# Patient Record
Sex: MALE | Race: WHITE | NOT HISPANIC OR LATINO | Employment: FULL TIME | URBAN - METROPOLITAN AREA
[De-identification: names, ages, dates, MRNs, and addresses within clinical notes are randomized per-mention and may not be internally consistent; named-entity substitution may affect disease eponyms.]

---

## 2017-03-27 ENCOUNTER — HOSPITAL ENCOUNTER (EMERGENCY)
Facility: CLINIC | Age: 27
Discharge: HOME | End: 2017-03-28
Attending: EMERGENCY MEDICINE | Admitting: EMERGENCY MEDICINE
Payer: COMMERCIAL

## 2017-03-27 DIAGNOSIS — F10.229 ACUTE ALCOHOLIC INTOXICATION IN ALCOHOLISM, WITH UNSPECIFIED COMPLICATION (H): ICD-10-CM

## 2017-03-27 DIAGNOSIS — F32.A DEPRESSION, UNSPECIFIED DEPRESSION TYPE: ICD-10-CM

## 2017-03-27 LAB
ALCOHOL BREATH TEST: 0.38 (ref 0–0.01)
AMPHETAMINES UR QL SCN: ABNORMAL
ANION GAP SERPL CALCULATED.3IONS-SCNC: 11 MMOL/L (ref 3–14)
APAP SERPL-MCNC: NORMAL MG/L (ref 10–20)
BARBITURATES UR QL: ABNORMAL
BENZODIAZ UR QL: ABNORMAL
BUN SERPL-MCNC: 10 MG/DL (ref 7–30)
CALCIUM SERPL-MCNC: 8.6 MG/DL (ref 8.5–10.1)
CANNABINOIDS UR QL SCN: ABNORMAL
CHLORIDE SERPL-SCNC: 109 MMOL/L (ref 94–109)
CO2 SERPL-SCNC: 26 MMOL/L (ref 20–32)
COCAINE UR QL: ABNORMAL
CREAT SERPL-MCNC: 0.68 MG/DL (ref 0.66–1.25)
ETHANOL UR QL SCN: ABNORMAL
GFR SERPL CREATININE-BSD FRML MDRD: ABNORMAL ML/MIN/1.7M2
GLUCOSE SERPL-MCNC: 95 MG/DL (ref 70–99)
OPIATES UR QL SCN: ABNORMAL
POTASSIUM SERPL-SCNC: 3.6 MMOL/L (ref 3.4–5.3)
SALICYLATES SERPL-MCNC: NORMAL MG/DL
SODIUM SERPL-SCNC: 146 MMOL/L (ref 133–144)

## 2017-03-27 PROCEDURE — 99285 EMERGENCY DEPT VISIT HI MDM: CPT | Mod: 25 | Performed by: EMERGENCY MEDICINE

## 2017-03-27 PROCEDURE — 80329 ANALGESICS NON-OPIOID 1 OR 2: CPT | Performed by: EMERGENCY MEDICINE

## 2017-03-27 PROCEDURE — 96360 HYDRATION IV INFUSION INIT: CPT | Performed by: EMERGENCY MEDICINE

## 2017-03-27 PROCEDURE — 93010 ELECTROCARDIOGRAM REPORT: CPT | Mod: 76 | Performed by: EMERGENCY MEDICINE

## 2017-03-27 PROCEDURE — 80307 DRUG TEST PRSMV CHEM ANLYZR: CPT | Performed by: FAMILY MEDICINE

## 2017-03-27 PROCEDURE — 99284 EMERGENCY DEPT VISIT MOD MDM: CPT | Mod: 25 | Performed by: EMERGENCY MEDICINE

## 2017-03-27 PROCEDURE — 82075 ASSAY OF BREATH ETHANOL: CPT | Performed by: EMERGENCY MEDICINE

## 2017-03-27 PROCEDURE — 93005 ELECTROCARDIOGRAM TRACING: CPT | Mod: 76 | Performed by: EMERGENCY MEDICINE

## 2017-03-27 PROCEDURE — 80320 DRUG SCREEN QUANTALCOHOLS: CPT | Performed by: FAMILY MEDICINE

## 2017-03-27 PROCEDURE — 36415 COLL VENOUS BLD VENIPUNCTURE: CPT | Performed by: EMERGENCY MEDICINE

## 2017-03-27 PROCEDURE — 90791 PSYCH DIAGNOSTIC EVALUATION: CPT

## 2017-03-27 PROCEDURE — 25000128 H RX IP 250 OP 636

## 2017-03-27 PROCEDURE — 80048 BASIC METABOLIC PNL TOTAL CA: CPT | Performed by: EMERGENCY MEDICINE

## 2017-03-27 RX ORDER — SODIUM CHLORIDE 9 MG/ML
INJECTION, SOLUTION INTRAVENOUS
Status: COMPLETED
Start: 2017-03-27 | End: 2017-03-27

## 2017-03-27 RX ADMIN — Medication 1000 ML: at 21:26

## 2017-03-27 RX ADMIN — SODIUM CHLORIDE 1000 ML: 9 INJECTION, SOLUTION INTRAVENOUS at 21:26

## 2017-03-27 ASSESSMENT — ENCOUNTER SYMPTOMS: DYSPHORIC MOOD: 1

## 2017-03-27 NOTE — ED AVS SNAPSHOT
[facility], Emergency Department    [address]    [place] [address]    Phone:  [phone]    Fax:  [phone]                                       Martín Shoemaker   MRN: [id number]    Department:  [facility], Emergency Department   Date of Visit:  3/27/2017           Patient Information     Date Of Birth          1990        Your diagnoses for this visit were:     Acute alcoholic intoxication in alcoholism, with unspecified complication (H)     Depression, unspecified depression type        You were seen by Fang Vicente MD.        Discharge Instructions       To check the status of detox bed availability at [facility] call:  [phone]  To check the status of detox availability at [facility] call:  [phone]  To check the status of detox bed availability at [address] [place] [address] call:  [phone]      24 Hour Appointment Hotline       To make an appointment at any [facility] clinic, call [phone] ([phone]). If you don't have a family doctor or clinic, we will help you find one. [facility] clinics are conveniently located to serve the needs of you and your family.             Review of your medicines      Our records show that you are taking the medicines listed below. If these are incorrect, please call your family doctor or clinic.        Dose / Directions Last dose taken    LEXAPRO PO   Dose:  10 mg        Take 10 mg by mouth daily   Refills:  0                Procedures and tests performed during your visit     Acetaminophen level    Alcohol breath test POCT    Basic metabolic panel    Drug abuse screen 6 urine (chem dep)    EKG 12 lead    EKG 12-lead, tracing only    Salicylate level      Orders Needing Specimen Collection     None      Pending Results     No orders found for last 3 day(s).            Pending Culture Results     No orders found for last 3 day(s).            Thank you for choosing [facility]       Thank you for choosing [facility] for your care. Our goal is  "always to provide you with excellent care. Hearing back from our patients is one way we can continue to improve our services. Please take a few minutes to complete the written survey that you may receive in the mail after you visit with us. Thank you!        [facility] Information     [facility] lets you send messages to your doctor, view your test results, renew your prescriptions, schedule appointments and more. To sign up, go to [email or web address] . Click on \"Log in\" on the left side of the screen, which will take you to the Welcome page. Then click on \"Sign up Now\" on the right side of the page.     You will be asked to enter the access code listed below, as well as some personal information. Please follow the directions to create your username and password.     Your access code is: [email or web address]  Expires: 2017  6:39 AM     Your access code will  in 90 days. If you need help or a new code, please call your [facility] clinic or [phone].        Care EveryWhere ID     This is your Care EveryWhere ID. This could be used by other organizations to access your [facility] medical records  [id number]        After Visit Summary       This is your record. Keep this with you and show to your community pharmacist(s) and doctor(s) at your next visit.                  "

## 2017-03-28 VITALS
HEART RATE: 108 BPM | DIASTOLIC BLOOD PRESSURE: 70 MMHG | TEMPERATURE: 98.1 F | OXYGEN SATURATION: 98 % | SYSTOLIC BLOOD PRESSURE: 110 MMHG | RESPIRATION RATE: 30 BRPM

## 2017-03-28 ASSESSMENT — ENCOUNTER SYMPTOMS
SHORTNESS OF BREATH: 0
ABDOMINAL PAIN: 0
FEVER: 0

## 2017-03-28 NOTE — DISCHARGE INSTRUCTIONS
To check the status of detox bed availability at [facility] call:  [phone]  To check the status of detox availability at [facility] call:  [phone]  To check the status of detox bed availability at [address] [place] [address] call:  [phone]

## 2017-03-28 NOTE — ED PROVIDER NOTES
[facility] EMERGENCY DEPARTMENT ([facility])    March 27, 2017    ED 11   History     Chief Complaint   Patient presents with     Drug Overdose     patient reportedly took 2 extra lexapro today and two extra last night; 7 lexapro remain in bottle that was filled on 2/22/2017; per EMS and family at scene, pt has been drinking alcohol as well     Alcohol Problem     patient states he has not had any alcohol to drink and declined breathalyzer; smells of ETOH and is emotionally labile--crying one moment and fine the next     The history is provided by the patient and medical records.     Martín Shoemaker is a 26-year-old male who presents to the Emergency Department today after an apparent overdose.  He states he was in an argument with his significant other, went to his parents house.  He admits that he s been drinking today.  He is not forthcoming on what happened exactly, but paramedics report that he took an overdose.  He states that he took 2 Lexapro at approximately 2:30 PM.  He states that he took one Lexapro at approximately 1:30 AM.  He states that he did this because he depressed and wanted to feel better.  He adamantly denies suicidal ideation, denies any other coingestions.  Denies specifically Tylenol and salicylate ingestion.  He denies history of suicide attempts.  He does state that he has recently seen a psychiatrist and that is who prescribed the Lexapro.  Denies recreational drug use.  He denies any physical complaints at this time.  No vomiting today.      Family arrived, and states that he is a heavy drinker. They state that he seemed OK today, then girlfriend went out to get food, and when she came back he seemed sleepy, then put his head down on the table, and started drooling, and had decreased responsiveness. She became worried and called 911. He then told her that he took an extra Lexapro. She reports that they had an argument last night.     I have reviewed the Medications, Allergies,  Past Medical and Surgical History, and Social History in the [facility] system.  PAST MEDICAL HISTORY:   Past Medical History:   Diagnosis Date     Depressive disorder        PAST SURGICAL HISTORY: No past surgical history on file.    FAMILY HISTORY: No family history on file.    SOCIAL HISTORY:   Social History   Substance Use Topics     Smoking status: Not on file     Smokeless tobacco: Not on file     Alcohol use Yes      Comment: binge drinker, 1/2 L vodka over one day       Patient's Medications   New Prescriptions    No medications on file   Previous Medications    ESCITALOPRAM OXALATE (LEXAPRO PO)    Take 10 mg by mouth daily   Modified Medications    No medications on file   Discontinued Medications    No medications on file        No Known Allergies   Review of Systems   Constitutional: Negative for fever.   Respiratory: Negative for shortness of breath.    Cardiovascular: Negative for chest pain.   Gastrointestinal: Negative for abdominal pain.   Psychiatric/Behavioral: Positive for dysphoric mood and suicidal ideas.   All other systems reviewed and are negative.      Physical Exam   BP: 110/66  Pulse: 108  Heart Rate: 109  Temp: 98.3  F (36.8  C)  Resp: 16  SpO2: 92 %  Physical Exam   Constitutional: No distress.   Grossly intoxicated   HENT:   Head: Atraumatic.   Eyes: No scleral icterus.   Cardiovascular: Normal heart sounds and intact distal pulses.    Pulmonary/Chest: Breath sounds normal. No respiratory distress.   Abdominal: Soft. There is no tenderness.   Musculoskeletal: He exhibits no edema or tenderness.   Skin: Skin is warm. No rash noted. He is not diaphoretic.   Psychiatric:   Tearful at times       ED Course     ED Course     Procedures             EKG Interpretation:      Interpreted by Fang Vicente  Time reviewed: 1950  Symptoms at time of EKG: None   Rhythm: sinus tachycardia  Rate: 107  Axis: Normal  Ectopy: none  Conduction: normal -- QTc 467  ST Segments/ T Waves: No ST-T wave  changes  Q Waves: none  Comparison to prior: No old EKG available    Clinical Impression: mild sinus tach             EKG Interpretation:      Interpreted by Fang Vicente  Time reviewed:2340  Symptoms at time of EKG: None   Rhythm: Normal sinus   Rate: Normal  Axis: Normal  Ectopy: None  Conduction: Normal QTc 467  ST Segments/ T Waves: No ST-T wave changes and No acute ischemic changes  Q Waves: None  Comparison to prior: Unchanged - other than nl rate    Clinical Impression: normal EKG            Critical Care time:  none               Labs Ordered and Resulted from Time of ED Arrival Up to the Time of Departure from the ED   BASIC METABOLIC PANEL - Abnormal; Notable for the following:        Result Value    Sodium 146 (*)     All other components within normal limits   DRUG ABUSE SCREEN 6 CHEM DEP URINE ([facility]) - Abnormal; Notable for the following:     Ethanol Qual Urine   (*)     Value: Positive   Cutoff for a positive urine ethanol is greater than 0.05 g/dL.  This is an   unconfirmed screening result to be used for medical purposes only.      All other components within normal limits   ALCOHOL BREATH TEST POCT - Abnormal; Notable for the following:     Alcohol Breath Test 0.382 (*)     All other components within normal limits   ACETAMINOPHEN LEVEL   SALICYLATE LEVEL       Assessments & Plan (with Medical Decision Making)   It is unclear whether he truly overdosed on his Lexapro, other than taking one additional tab.  I did get some overdose labs including acetaminophen and salicylate levels, which were both negative.  BMP was not remarkable other than a slightly high sodium of 146.  Anion gap is normal.  He did breathless high at 0.382.  Drug-screen was positive only for ethanol.  EKG was done which showed a sinus tachycardia with a rate of 107, unremarkable, with a QTC at 467.  I did speak with the [facility] and they did recommend monitoring him for 4-6 hours and repeating an EKG.  This was done,  and his QTc remained stable at 467.  Poison Center stated that if his QTc was not widening they felt he would be medically cleared.  He was quite intoxicated upon arrival.  Family stated that things have not been going well.  They feel that he has been depressed.  It s unclear whether he did take an overdose or not.  We will plan to monitor him in the ER until his alcohol level decreases to a reasonable level where he can be assessed by the [facility] .  Patient will be signed out to the incoming provider at this time.      This part of the document was transcribed by Maggi Graves Medical Scribe.      I have reviewed the nursing notes.    I have reviewed the findings, diagnosis, plan and need for follow up with the patient.    New Prescriptions    No medications on file       Final diagnoses:   Acute alcoholic intoxication in alcoholism, with unspecified complication (H)   Depression, unspecified depression type   I, Maggi Graves, am serving as a trained medical scribe to document services personally performed by Fang Vicente MD, based on the provider's statements to me.  This document has been checked and approved by Dr. Vicente.    I, Fang Vicente MD, was physically present and have reviewed and verified the accuracy of this note documented by Maggi Graves medical scribe.      3/27/2017   [facility], [place], EMERGENCY DEPARTMENT     Fang Vicente MD  03/28/17 0207

## 2017-03-29 LAB — INTERPRETATION ECG - MUSE: NORMAL

## 2017-03-31 LAB — INTERPRETATION ECG - MUSE: NORMAL

## 2018-11-15 ENCOUNTER — HOSPITAL ENCOUNTER (EMERGENCY)
Facility: CLINIC | Age: 28
Discharge: HOME | End: 2018-11-16
Attending: EMERGENCY MEDICINE | Admitting: EMERGENCY MEDICINE
Payer: COMMERCIAL

## 2018-11-15 DIAGNOSIS — F10.220 ACUTE ALCOHOLIC INTOXICATION IN ALCOHOLISM WITHOUT COMPLICATION (H): ICD-10-CM

## 2018-11-15 LAB
ALBUMIN SERPL-MCNC: 4.1 G/DL (ref 3.4–5)
ALP SERPL-CCNC: 92 U/L (ref 40–150)
ALT SERPL W P-5'-P-CCNC: 93 U/L (ref 0–70)
ANION GAP SERPL CALCULATED.3IONS-SCNC: 10 MMOL/L (ref 3–14)
AST SERPL W P-5'-P-CCNC: 88 U/L (ref 0–45)
BASOPHILS # BLD AUTO: 0 10E9/L (ref 0–0.2)
BASOPHILS NFR BLD AUTO: 0.4 %
BILIRUB SERPL-MCNC: 0.3 MG/DL (ref 0.2–1.3)
BUN SERPL-MCNC: 13 MG/DL (ref 7–30)
CALCIUM SERPL-MCNC: 8.7 MG/DL (ref 8.5–10.1)
CHLORIDE SERPL-SCNC: 102 MMOL/L (ref 94–109)
CO2 SERPL-SCNC: 27 MMOL/L (ref 20–32)
CREAT SERPL-MCNC: 0.73 MG/DL (ref 0.66–1.25)
DIFFERENTIAL METHOD BLD: NORMAL
EOSINOPHIL # BLD AUTO: 0 10E9/L (ref 0–0.7)
EOSINOPHIL NFR BLD AUTO: 0.6 %
ERYTHROCYTE [DISTWIDTH] IN BLOOD BY AUTOMATED COUNT: 11.6 % (ref 10–15)
ETHANOL SERPL-MCNC: 0.47 G/DL
GFR SERPL CREATININE-BSD FRML MDRD: >90 ML/MIN/1.7M2
GLUCOSE SERPL-MCNC: 113 MG/DL (ref 70–99)
HCT VFR BLD AUTO: 46 % (ref 40–53)
HGB BLD-MCNC: 16.3 G/DL (ref 13.3–17.7)
IMM GRANULOCYTES # BLD: 0 10E9/L (ref 0–0.4)
IMM GRANULOCYTES NFR BLD: 0.4 %
LYMPHOCYTES # BLD AUTO: 2.7 10E9/L (ref 0.8–5.3)
LYMPHOCYTES NFR BLD AUTO: 37.4 %
MCH RBC QN AUTO: 32 PG (ref 26.5–33)
MCHC RBC AUTO-ENTMCNC: 35.4 G/DL (ref 31.5–36.5)
MCV RBC AUTO: 90 FL (ref 78–100)
MONOCYTES # BLD AUTO: 0.4 10E9/L (ref 0–1.3)
MONOCYTES NFR BLD AUTO: 5.3 %
NEUTROPHILS # BLD AUTO: 4 10E9/L (ref 1.6–8.3)
NEUTROPHILS NFR BLD AUTO: 55.9 %
PLATELET # BLD AUTO: 240 10E9/L (ref 150–450)
POTASSIUM SERPL-SCNC: 3.6 MMOL/L (ref 3.4–5.3)
PROT SERPL-MCNC: 8.3 G/DL (ref 6.8–8.8)
RBC # BLD AUTO: 5.09 10E12/L (ref 4.4–5.9)
SODIUM SERPL-SCNC: 139 MMOL/L (ref 133–144)
WBC # BLD AUTO: 7.1 10E9/L (ref 4–11)

## 2018-11-15 PROCEDURE — 99284 EMERGENCY DEPT VISIT MOD MDM: CPT | Mod: 25

## 2018-11-15 PROCEDURE — 25000128 H RX IP 250 OP 636: Performed by: EMERGENCY MEDICINE

## 2018-11-15 PROCEDURE — 96374 THER/PROPH/DIAG INJ IV PUSH: CPT

## 2018-11-15 PROCEDURE — 80053 COMPREHEN METABOLIC PANEL: CPT | Performed by: EMERGENCY MEDICINE

## 2018-11-15 PROCEDURE — 82075 ASSAY OF BREATH ETHANOL: CPT

## 2018-11-15 PROCEDURE — 25000132 ZZH RX MED GY IP 250 OP 250 PS 637: Performed by: EMERGENCY MEDICINE

## 2018-11-15 PROCEDURE — 96361 HYDRATE IV INFUSION ADD-ON: CPT

## 2018-11-15 PROCEDURE — 80320 DRUG SCREEN QUANTALCOHOLS: CPT | Performed by: EMERGENCY MEDICINE

## 2018-11-15 PROCEDURE — 85025 COMPLETE CBC W/AUTO DIFF WBC: CPT | Performed by: EMERGENCY MEDICINE

## 2018-11-15 RX ORDER — SODIUM CHLORIDE 9 MG/ML
1000 INJECTION, SOLUTION INTRAVENOUS CONTINUOUS
Status: DISCONTINUED | OUTPATIENT
Start: 2018-11-15 | End: 2018-11-16 | Stop reason: HOSPADM

## 2018-11-15 RX ORDER — MULTIVITAMIN,THERAPEUTIC
1 TABLET ORAL ONCE
Status: COMPLETED | OUTPATIENT
Start: 2018-11-15 | End: 2018-11-15

## 2018-11-15 RX ORDER — LANOLIN ALCOHOL/MO/W.PET/CERES
100 CREAM (GRAM) TOPICAL ONCE
Status: COMPLETED | OUTPATIENT
Start: 2018-11-15 | End: 2018-11-15

## 2018-11-15 RX ORDER — MAGNESIUM OXIDE 400 MG/1
800 TABLET ORAL ONCE
Status: COMPLETED | OUTPATIENT
Start: 2018-11-15 | End: 2018-11-15

## 2018-11-15 RX ORDER — FOLIC ACID 1 MG/1
1 TABLET ORAL ONCE
Status: COMPLETED | OUTPATIENT
Start: 2018-11-15 | End: 2018-11-15

## 2018-11-15 RX ADMIN — THERA TABS 1 TABLET: TAB at 21:05

## 2018-11-15 RX ADMIN — Medication 100 MG: at 21:05

## 2018-11-15 RX ADMIN — SODIUM CHLORIDE 1000 ML: 9 INJECTION, SOLUTION INTRAVENOUS at 21:05

## 2018-11-15 RX ADMIN — Medication 800 MG: at 21:11

## 2018-11-15 RX ADMIN — FOLIC ACID 1 MG: 1 TABLET ORAL at 21:05

## 2018-11-15 ASSESSMENT — ENCOUNTER SYMPTOMS
CHILLS: 0
NAUSEA: 0
FEVER: 0
HEADACHES: 0
VOMITING: 0
ABDOMINAL PAIN: 0

## 2018-11-15 NOTE — ED AVS SNAPSHOT
Emergency Department    [address] [place] [address]    Phone:  [phone]    Fax:  [phone]                                       Martín Shoemaker   MRN: [id number]    Department:   Emergency Department   Date of Visit:  11/15/2018           After Visit Summary Signature Page     I have received my discharge instructions, and my questions have been answered. I have discussed any challenges I see with this plan with the nurse or doctor.    ..........................................................................................................................................  Patient/Patient Representative Signature      ..........................................................................................................................................  Patient Representative Print Name and Relationship to Patient    ..................................................               ................................................  Date                                   Time    ..........................................................................................................................................  Reviewed by Signature/Title    ...................................................              ..............................................  Date                                               Time          22EPIC Rev 08/18

## 2018-11-15 NOTE — ED AVS SNAPSHOT
Emergency Department    [address] [facility] [address]    Phone:  [phone]    Fax:  [phone]                                       Martín Shoemaker   MRN: [id number]    Department:   Emergency Department   Date of Visit:  11/15/2018           Patient Information     Date Of Birth          1990        Your diagnoses for this visit were:     Acute alcoholic intoxication in alcoholism without complication (H)        You were seen by John Mckeon MD and Delisa Ocasio MD.      Follow-up Information     Follow up with Treatment options for alcohol abuse list provided. Call in 1 day.        Follow up with Jose Manuel Stockton MD.    Specialty:  Internal Medicine    Why:  As needed, primary care provider    Contact information:    [address]  [facility] [address] [phone]          Follow up with  Emergency Department.    Specialty:  EMERGENCY MEDICINE    Why:  If symptoms worsen    Contact information:    [address] [facility] [address] [phone]        Discharge Instructions       Seek help for your alcohol addiction.    *[facility] CD Intake  (type CD intake in the search field)  [email or web address].Index  [phone]   inpatient services [phone]  or [phone] To arrange an appointment with CD counselor   [facility]  [email or web address]  [phone] Hours vary, call for information  Rule 25 assessments  Counseling & assessments  For CD & outpatient treatment   [place]  [facility] ([facility])  http://mntc.org   [phone] [address] [facility]  Residential drug and alcohol programs serving teens and adults from all ethnic, socioeconomic and Restorationism backgrounds       AA                                     [phone]                        [place] and [facility] site  [email or web address]        Discharge Instructions  Alcohol Intoxication    You have been seen today with alcohol intoxication. This means that  appts canceled per request, will reschedule   you have enough alcohol in your system to impair your ability to mentally and physically function, perhaps to the extent that you were unable to care for yourself.    Generally, every Emergency Department visit should have a follow-up clinic visit with either a primary or a specialty clinic/provider. Please follow-up as instructed by your emergency provider today.    You may have come to the Emergency Department because of your intoxication, or for another reason, such as because of an injury. No matter what the case is, this visit is a  red flag  regarding alcohol use, and you should consider whether your drinking pattern is a problem for you.     You may be at risk for alcohol-related problems if:      Men: you drink more than 14 drinks per week, or more than 4 drinks per occasion.      Women: you drink more than 7 drinks per week or more than 3 drinks per occasion.      You have black-outs.    You do things you regret while drinking.    You have legal problems because of drinking.    You have job problems because of drinking (you call in sick to work because of drinking).    CAGE Questions    Have you ever felt you should cut down on your drinking?    Have people annoyed you by criticizing your drinking?    Have you ever felt bad or guilty about your drinking?    Have you ever had a drink first thing in the morning to steady your nerves or get rid of a hangover (eye opener)?    If you answer yes to any of the CAGE questions, you may have a problem with alcohol.      Return to the Emergency Department if:    You become shaky or tremble when you try to stop drinking.     You have severe abdominal pain (belly pain).     You have a seizure or pass out.      You vomit (throw up) blood or have blood in your stool. This may be bright red or it may look like black coffee grounds.    You become lightheaded or faint.      For further help, contact:     Your caregiver.      Alcoholics Anonymous (AA).    o [facility]  [place] Intergroup: [phone]  o [facility] Central Office: [phone]     A drug or alcohol rehabilitation program.      You can get information on alcohol resources and groups by calling the number 211 or [phone] on any phone.     Seek medical care if:    You have persistent vomiting.     You have persistent pain in any part of your body.      You do not feel better after a few days.    If you were given a prescription for medicine here today, be sure to read all of the information (including the package insert) that comes with your prescription.  This will include important information about the medicine, its side effects, and any warnings that you need to know about.  The pharmacist who fills the prescription can provide more information and answer questions you may have about the medicine.  If you have questions or concerns that the pharmacist cannot address, please call or return to the Emergency Department.   Remember that you can always come back to the Emergency Department if you are not able to see your regular doctor in the amount of time listed above, if you get any new symptoms, or if there is anything that worries you.      24 Hour Appointment Hotline       To make an appointment at any [facility], call [phone] ([phone]). If you don't have a family doctor or clinic, we will help you find one. [place] clinics are conveniently located to serve the needs of you and your family.             Review of your medicines      Notice     You have not been prescribed any medications.            Procedures and tests performed during your visit     Alcohol breath test POCT    Alcohol ethyl    CBC with platelets differential    Comprehensive metabolic panel      Orders Needing Specimen Collection     None      Pending Results     No orders found for last 3 day(s).            Pending Culture Results     No orders found for last 3 day(s).            Pending Results  Instructions     If you had any lab results that were not finalized at the time of your Discharge, you can call the ED Lab Result RN at [phone]. You will be contacted by this team for any positive Lab results or changes in treatment. The nurses are available 7 days a week from 10A to 6:30P.  You can leave a message 24 hours per day and they will return your call.        Test Results From Your Hospital Stay        11/15/2018  9:13 PM      Component Results     Component Value Ref Range & Units Status    Ethanol g/dL 0.47 (HH) <0.01 g/dL Final    Critical Value called to and read back by  KIT LANE RN ON ER AT 2108 AN           11/15/2018  9:03 PM      Component Results     Component Value Ref Range & Units Status    WBC 7.1 4.0 - 11.0 10e9/L Final    RBC Count 5.09 4.4 - 5.9 10e12/L Final    Hemoglobin 16.3 13.3 - 17.7 g/dL Final    Hematocrit 46.0 40.0 - 53.0 % Final    MCV 90 78 - 100 fl Final    MCH 32.0 26.5 - 33.0 pg Final    MCHC 35.4 31.5 - 36.5 g/dL Final    RDW 11.6 10.0 - 15.0 % Final    Platelet Count 240 150 - 450 10e9/L Final    Diff Method Automated Method  Final    % Neutrophils 55.9 % Final    % Lymphocytes 37.4 % Final    % Monocytes 5.3 % Final    % Eosinophils 0.6 % Final    % Basophils 0.4 % Final    % Immature Granulocytes 0.4 % Final    Absolute Neutrophil 4.0 1.6 - 8.3 10e9/L Final    Absolute Lymphocytes 2.7 0.8 - 5.3 10e9/L Final    Absolute Monocytes 0.4 0.0 - 1.3 10e9/L Final    Absolute Eosinophils 0.0 0.0 - 0.7 10e9/L Final    Absolute Basophils 0.0 0.0 - 0.2 10e9/L Final    Abs Immature Granulocytes 0.0 0 - 0.4 10e9/L Final         11/15/2018  9:11 PM      Component Results     Component Value Ref Range & Units Status    Sodium 139 133 - 144 mmol/L Final    Potassium 3.6 3.4 - 5.3 mmol/L Final    Chloride 102 94 - 109 mmol/L Final    Carbon Dioxide 27 20 - 32 mmol/L Final    Anion Gap 10 3 - 14 mmol/L Final    Glucose 113 (H) 70 - 99 mg/dL Final    Urea Nitrogen 13 7 - 30  mg/dL Final    Creatinine 0.73 0.66 - 1.25 mg/dL Final    GFR Estimate >90 >60 mL/min/1.7m2 Final    Non  GFR Calc    GFR Estimate If Black >90 >60 mL/min/1.7m2 Final    African American GFR Calc    Calcium 8.7 8.5 - 10.1 mg/dL Final    Bilirubin Total 0.3 0.2 - 1.3 mg/dL Final    Albumin 4.1 3.4 - 5.0 g/dL Final    Protein Total 8.3 6.8 - 8.8 g/dL Final    Alkaline Phosphatase 92 40 - 150 U/L Final    ALT 93 (H) 0 - 70 U/L Final    AST 88 (H) 0 - 45 U/L Final         11/16/2018  1:17 AM      Component Results     Component Value Ref Range & Units Status    Alcohol Breath Test 0.336 (A) 0.00 - 0.01 Final                Clinical Quality Measure: Blood Pressure Screening     Your blood pressure was checked while you were in the emergency department today. The last reading we obtained was  BP: (!) 138/105 . Please read the guidelines below about what these numbers mean and what you should do about them.  If your systolic blood pressure (the top number) is less than 120 and your diastolic blood pressure (the bottom number) is less than 80, then your blood pressure is normal. There is nothing more that you need to do about it.  If your systolic blood pressure (the top number) is 120-139 or your diastolic blood pressure (the bottom number) is 80-89, your blood pressure may be higher than it should be. You should have your blood pressure rechecked within a year by a primary care provider.  If your systolic blood pressure (the top number) is 140 or greater or your diastolic blood pressure (the bottom number) is 90 or greater, you may have high blood pressure. High blood pressure is treatable, but if left untreated over time it can put you at risk for heart attack, stroke, or kidney failure. You should have your blood pressure rechecked by a primary care provider within the next 4 weeks.  If your provider in the emergency department today gave you specific instructions to follow-up with your doctor or  "provider even sooner than that, you should follow that instruction and not wait for up to 4 weeks for your follow-up visit.        Thank you for choosing [facility]       Thank you for choosing [facility] for your care. Our goal is always to provide you with excellent care. Hearing back from our patients is one way we can continue to improve our services. Please take a few minutes to complete the written survey that you may receive in the mail after you visit with us. Thank you!        [employer] Information     [employer] lets you send messages to your doctor, view your test results, renew your prescriptions, schedule appointments and more. To sign up, go to [email or web address] . Click on \"Log in\" on the left side of the screen, which will take you to the Welcome page. Then click on \"Sign up Now\" on the right side of the page.     You will be asked to enter the access code listed below, as well as some personal information. Please follow the directions to create your username and password.     Your access code is: [email or web address]  Expires: 2019  2:18 AM     Your access code will  in 90 days. If you need help or a new code, please call your [facility] clinic or [phone].        Care EveryWhere ID     This is your Care EveryWhere ID. This could be used by other organizations to access your [facility] medical records  [id number]        Equal Access to Services     RADHA FINN : Hadii gisselle Lopes, waaxda luqadaha, qaybta kaalmada yue, alexsander cui . So [facility] [phone].    ATENCIÓN: Si habla español, tiene a landeros disposición servicios gratuitos de asistencia lingüística. Janeth al [phone].    We comply with applicable federal civil rights laws and [place] laws. We do not discriminate on the basis of race, color, national origin, age, disability, sex, sexual orientation, or gender identity.            After Visit Summary       This is your record. Keep this with " you and show to your community pharmacist(s) and doctor(s) at your next visit.

## 2018-11-16 VITALS
HEART RATE: 99 BPM | BODY MASS INDEX: 27.09 KG/M2 | OXYGEN SATURATION: 98 % | HEIGHT: 72 IN | RESPIRATION RATE: 20 BRPM | WEIGHT: 200 LBS | TEMPERATURE: 98 F | SYSTOLIC BLOOD PRESSURE: 135 MMHG | DIASTOLIC BLOOD PRESSURE: 98 MMHG

## 2018-11-16 LAB — ALCOHOL BREATH TEST: 0.34 (ref 0–0.01)

## 2018-11-16 PROCEDURE — 25000128 H RX IP 250 OP 636: Performed by: EMERGENCY MEDICINE

## 2018-11-16 RX ORDER — ONDANSETRON 2 MG/ML
INJECTION INTRAMUSCULAR; INTRAVENOUS
Status: DISCONTINUED
Start: 2018-11-16 | End: 2018-11-16 | Stop reason: HOSPADM

## 2018-11-16 RX ORDER — ONDANSETRON 2 MG/ML
4 INJECTION INTRAMUSCULAR; INTRAVENOUS ONCE
Status: COMPLETED | OUTPATIENT
Start: 2018-11-16 | End: 2018-11-16

## 2018-11-16 RX ADMIN — ONDANSETRON 4 MG: 2 INJECTION INTRAMUSCULAR; INTRAVENOUS at 00:57

## 2018-11-16 NOTE — ED NOTES
RN at bedside, pt stood up and vomited all over the floor. Pt was given IV zofran and cleaned up and assisted back to bed.

## 2018-11-16 NOTE — ED PROVIDER NOTES
Patient was endorsed to me by Dr. Mckeon pending sobriety for safe discharge home with father as there are no detox beds available. Patient was brought in by father due to alcohol intoxication but has no complaints.    I have reviewed patient's lab results, vital signs, and nursing notes which are remarkable for BAL of 0.47.    0100: Patient evaluated. Just had emesis. No clear alcohol withdrawal including no tremor, tongue fasciculations, or significant change in heart rate or BP. Ambulatory with steady gait per RN. Patient is calm, cooperative, and without complaint. He has no slurred speech with repeat Breathalyzer 0.336. Is safe for discharge home in the care of his father.    0208: Father here and comfortable caring for patient. I recommended patient seek help for his alcohol addiction which he has declined here. Resources provided on discharge paperwork. Patient and father to return with new concerns but are currently amenable to plan for discharge which I believe is safe as patient is ambulatory with steady gait with no evidence of medical emergency or alcohol withdrawal and his father can care for him.     Delisa Ocasio MD  11/19/18 1621

## 2018-11-16 NOTE — ED NOTES
RN called father and updated him on pt condition. RN updated father that he is still intoxicated but is stable on his feet and acting appropriate. MD updated and father reports he will be here in 30 minutes

## 2018-11-16 NOTE — ED NOTES
Bed: [facility]  Expected date:   Expected time:   Means of arrival:   Comments:  EMS here?

## 2018-11-16 NOTE — ED NOTES
DATE:  11/15/2018   TIME OF RECEIPT FROM LAB:  2108  LAB TEST:  ETOH  LAB VALUE:  0.47  RESULTS GIVEN WITH READ-BACK TO (PROVIDER): Dr. Mckeon  TIME LAB VALUE REPORTED TO PROVIDER:   2115

## 2018-11-16 NOTE — DISCHARGE INSTRUCTIONS
Seek help for your alcohol addiction.    *[place] CD Intake  (type CD intake in the search field)  [email or web address]  [phone]   inpatient services [phone]  or [phone] To arrange an appointment with CD counselor   [facility]  [email or web address]  [phone] Hours vary, call for information  Rule 25 assessments  Counseling & assessments  For CD & outpatient treatment   [place]  [facility] ([facility])  http://mntc.org   [phone] [address] [facility]  Residential drug and alcohol programs serving teens and adults from all ethnic, socioeconomic and Yazidism backgrounds                                            [phone]                        [facility] site  [email or web address]        Discharge Instructions  Alcohol Intoxication    You have been seen today with alcohol intoxication. This means that you have enough alcohol in your system to impair your ability to mentally and physically function, perhaps to the extent that you were unable to care for yourself.    Generally, every Emergency Department visit should have a follow-up clinic visit with either a primary or a specialty clinic/provider. Please follow-up as instructed by your emergency provider today.    You may have come to the Emergency Department because of your intoxication, or for another reason, such as because of an injury. No matter what the case is, this visit is a  red flag  regarding alcohol use, and you should consider whether your drinking pattern is a problem for you.     You may be at risk for alcohol-related problems if:      Men: you drink more than 14 drinks per week, or more than 4 drinks per occasion.      Women: you drink more than 7 drinks per week or more than 3 drinks per occasion.      You have black-outs.    You do things you regret while drinking.    You have legal problems because of drinking.    You have job problems because of drinking (you call in sick to work  because of drinking).    CAGE Questions    Have you ever felt you should cut down on your drinking?    Have people annoyed you by criticizing your drinking?    Have you ever felt bad or guilty about your drinking?    Have you ever had a drink first thing in the morning to steady your nerves or get rid of a hangover (eye opener)?    If you answer yes to any of the CAGE questions, you may have a problem with alcohol.      Return to the Emergency Department if:    You become shaky or tremble when you try to stop drinking.     You have severe abdominal pain (belly pain).     You have a seizure or pass out.      You vomit (throw up) blood or have blood in your stool. This may be bright red or it may look like black coffee grounds.    You become lightheaded or faint.      For further help, contact:     Your caregiver.      Alcoholics Anonymous (AA).    o [facility] Intergroup: [phone]  o [facility] Central Office: [phone]     A drug or alcohol rehabilitation program.      You can get information on alcohol resources and groups by calling the number 115 or [phone] on any phone.     Seek medical care if:    You have persistent vomiting.     You have persistent pain in any part of your body.      You do not feel better after a few days.    If you were given a prescription for medicine here today, be sure to read all of the information (including the package insert) that comes with your prescription.  This will include important information about the medicine, its side effects, and any warnings that you need to know about.  The pharmacist who fills the prescription can provide more information and answer questions you may have about the medicine.  If you have questions or concerns that the pharmacist cannot address, please call or return to the Emergency Department.   Remember that you can always come back to the Emergency Department if you are not able to see your regular doctor in  the amount of time listed above, if you get any new symptoms, or if there is anything that worries you.

## 2018-11-16 NOTE — PHARMACY-ADMISSION MEDICATION HISTORY
Admission medication history interview status for the 11/15/2018  admission is complete. See EPIC admission navigator for prior to admission medications     Medication history source reliability:Moderate - per patient report at this time - no home medications.     Actions taken by pharmacist (provider contacted, etc):None     Additional medication history information not noted on PTA med list :None    Medication reconciliation/reorder completed by provider prior to medication history? No    Time spent in this activity: 10 in    Prior to Admission medications    None

## 2018-11-16 NOTE — ED PROVIDER NOTES
History     Chief Complaint:  Alcohol Intoxication    HPI   Martín Shoemaker is a 27 year old male with a history of depression and previous hospitalization after alcoholic binge who presents with alcohol intoxication. The patient presents to the ED with his father after some belligerent behavior this evening while [place]. The patient states he does not have a drinking problem, but his father notes he drinks daily and has done so for multiple years. The patient reports his last drink was yesterday, but his father notes he has been drinking heavily since 1500 this afternoon. The patient's father reports he did not feel safe with the patient earlier in the evening and wants him to get help for this problem. Here in the ED, the patient denies any nausea, vomiting, black stools, abdominal pain, suicidal thoughts or co-ingestion. The patient's father reports he has a history of seizures with alcohol withdrawal as well.    Allergies:  No known drug allergies    Medications:    Lexapro    Past Medical History:    Depression  Hypertension    Past Surgical History:    History reviewed. No pertinent surgical history.    Family History:    History reviewed. No pertinent family history.     Social History:  Smoking status: Yes  Smokeless tobacco: Yes  Alcohol use: Yes, 0.5 L vodka daily during binges  Presents to the ED with his father  Marital Status: Single [1]     Review of Systems   Constitutional: Negative for chills and fever.   Cardiovascular: Negative for chest pain.   Gastrointestinal: Negative for abdominal pain, nausea and vomiting.   Neurological: Negative for headaches.   Psychiatric/Behavioral: Negative for self-injury and suicidal ideas.   All other systems reviewed and are negative.    Physical Exam   Patient Vitals for the past 24 hrs:   BP Temp Temp src Pulse Heart Rate Resp SpO2 Height Weight   11/15/18 2130 - - - - - - 95 % - -   11/15/18 2030 (!) 152/105 - - - - - 94 % - -    11/15/18 2015 (!) 148/104 - - - - - 94 % - -   11/15/18 2005 - - - - - - 95 % - -   11/15/18 2000 164/80 - - - - - 94 % - -   11/15/18 1945 150/88 98  F (36.7  C) Oral 139 139 22 92 % - -   11/15/18 1940 - - - - - - - 1.829 m (6') 90.7 kg (200 lb)     Physical Exam   SKIN:  Warm, dry.  No jaundice.  HEMATOLOGIC/IMMUNOLOGIC/LYMPHATIC:  No pallor.  HENT:  Moist oral mucosa.  Flushed face.  EYES:  Conjunctivae normal.  Anicteric.  CARDIOVASCULAR:  Tachycardic rate and regular rhythm.  No murmur.  RESPIRATORY:  No respiratory distress, breath sounds equal and normal.  GASTROINTESTINAL:  Soft, nontender abdomen with active bowel sounds.    MUSCULOSKELETAL:  Normal body habitus.  NEUROLOGIC:  Alert, conversant.  Slurred speech.    PSYCHIATRIC:  Normal mood and affect.  No psychotic features.    Emergency Department Course   Laboratory:  Alcohol ethyl: 0.47 (HH)  CBC: WNL (WBC 7.1, HGB 16.3, )  CMP: Glucose 113 (H), ALT 93 (H), AST 88 (H), o/w WNL (Creatinine 0.73)    Interventions:  2105: NS 1L IV Bolus  2105: 1 Multivitamin tablet PO  2105: 1 mg Folic acid PO  2105: 100 mg Thiamine PO  2111: 800 mg Magnesium oxide PO    Emergency Department Course:  Past medical records, nursing notes, and vitals reviewed.  2024: I performed an exam of the patient and obtained history, as documented above.   IV inserted and blood drawn.    2155: I spoke with the patient's ED nurse who states there are no detox beds available. The patient does not need DEC evaluation.    2300: The patient will be signed out to my colleague Dr. Ocasio.    Impression & Plan    Medical Decision Making:  Martín Shoemaker is a 27 year old male who presents to the ED for evaluation of alcohol intoxication. He suffers chronic alcoholism and his dad brought him in because he was worried about his degree of difficulty with this problem as well as his current intoxication. Screening tests performed which confirmed elevated alcohol level. Otherwise he no  hematologic or metabolic derangement barring liver function results being elevated, but not extremely so. He was administered oral multivitamins and hydrated intravenously. Currently there are no detox beds so the patient will sober up until he is stable on his feet.  At that point his dad will pick him up. This will take some time given how intoxicated he is so I signed the patient out to my colleague to follow-up on this process.    Diagnosis:    ICD-10-CM   1. Acute alcoholic intoxication in alcoholism without complication (H) F10.220     Disposition: The patient was signed out to my colleague Dr. Ocasio pending clinical sobriety.    Perri Haji  11/15/2018    EMERGENCY DEPARTMENT    I, Perri Haji, am serving as a scribe at 8:24 PM on 11/15/2018 to document services personally performed by John Mckeon MD based on my observations and the provider's statements to me.      John Mckeon MD  11/16/18 1036

## 2019-03-11 NOTE — ED AVS SNAPSHOT
[facility], [place], Emergency Department    [address]    [place] [address]    Phone:  [phone]    Fax:  [phone]                                       Martín Shoemaker   MRN: [id number]    Department:  [facility], Emergency Department   Date of Visit:  3/27/2017           After Visit Summary Signature Page     I have received my discharge instructions, and my questions have been answered. I have discussed any challenges I see with this plan with the nurse or doctor.    ..........................................................................................................................................  Patient/Patient Representative Signature      ..........................................................................................................................................  Patient Representative Print Name and Relationship to Patient    ..................................................               ................................................  Date                                            Time    ..........................................................................................................................................  Reviewed by Signature/Title    ...................................................              ..............................................  Date                                                            Time           09-Mar-2019

## 2020-11-04 ENCOUNTER — HOSPITAL ENCOUNTER (EMERGENCY)
Facility: CLINIC | Age: 30
Discharge: HOME | End: 2020-11-04
Attending: EMERGENCY MEDICINE | Admitting: EMERGENCY MEDICINE
Payer: COMMERCIAL

## 2020-11-04 ENCOUNTER — MEDICAL CORRESPONDENCE (OUTPATIENT)
Dept: EMERGENCY MEDICINE | Facility: CLINIC | Age: 30
End: 2020-11-04

## 2020-11-04 VITALS
BODY MASS INDEX: 27.12 KG/M2 | SYSTOLIC BLOOD PRESSURE: 134 MMHG | RESPIRATION RATE: 16 BRPM | WEIGHT: 200 LBS | OXYGEN SATURATION: 95 % | DIASTOLIC BLOOD PRESSURE: 92 MMHG | HEART RATE: 95 BPM | TEMPERATURE: 98.6 F

## 2020-11-04 DIAGNOSIS — F10.920 ALCOHOLIC INTOXICATION WITHOUT COMPLICATION (H): ICD-10-CM

## 2020-11-04 LAB
ALBUMIN SERPL-MCNC: 4.7 G/DL (ref 3.4–5)
ALCOHOL BREATH TEST: 0.41 (ref 0–0.01)
ALP SERPL-CCNC: 108 U/L (ref 40–150)
ALT SERPL W P-5'-P-CCNC: 48 U/L (ref 0–70)
AMPHETAMINES UR QL SCN: NEGATIVE
ANION GAP SERPL CALCULATED.3IONS-SCNC: 12 MMOL/L (ref 3–14)
AST SERPL W P-5'-P-CCNC: 47 U/L (ref 0–45)
BARBITURATES UR QL: NEGATIVE
BASOPHILS # BLD AUTO: 0 10E9/L (ref 0–0.2)
BASOPHILS NFR BLD AUTO: 0.5 %
BENZODIAZ UR QL: NEGATIVE
BILIRUB SERPL-MCNC: 0.9 MG/DL (ref 0.2–1.3)
BUN SERPL-MCNC: 14 MG/DL (ref 7–30)
CALCIUM SERPL-MCNC: 8.4 MG/DL (ref 8.5–10.1)
CANNABINOIDS UR QL SCN: POSITIVE
CHLORIDE SERPL-SCNC: 102 MMOL/L (ref 94–109)
CO2 SERPL-SCNC: 27 MMOL/L (ref 20–32)
COCAINE UR QL: NEGATIVE
CREAT SERPL-MCNC: 0.74 MG/DL (ref 0.66–1.25)
DIFFERENTIAL METHOD BLD: NORMAL
EOSINOPHIL # BLD AUTO: 0 10E9/L (ref 0–0.7)
EOSINOPHIL NFR BLD AUTO: 0.4 %
ERYTHROCYTE [DISTWIDTH] IN BLOOD BY AUTOMATED COUNT: 12.6 % (ref 10–15)
ETHANOL UR QL SCN: POSITIVE
GFR SERPL CREATININE-BSD FRML MDRD: >90 ML/MIN/{1.73_M2}
GLUCOSE SERPL-MCNC: 88 MG/DL (ref 70–99)
HCT VFR BLD AUTO: 49.5 % (ref 40–53)
HGB BLD-MCNC: 17.5 G/DL (ref 13.3–17.7)
IMM GRANULOCYTES # BLD: 0 10E9/L (ref 0–0.4)
IMM GRANULOCYTES NFR BLD: 0.3 %
LYMPHOCYTES # BLD AUTO: 2.5 10E9/L (ref 0.8–5.3)
LYMPHOCYTES NFR BLD AUTO: 31 %
MCH RBC QN AUTO: 30.2 PG (ref 26.5–33)
MCHC RBC AUTO-ENTMCNC: 35.4 G/DL (ref 31.5–36.5)
MCV RBC AUTO: 86 FL (ref 78–100)
MONOCYTES # BLD AUTO: 0.4 10E9/L (ref 0–1.3)
MONOCYTES NFR BLD AUTO: 5 %
NEUTROPHILS # BLD AUTO: 5 10E9/L (ref 1.6–8.3)
NEUTROPHILS NFR BLD AUTO: 62.8 %
NRBC # BLD AUTO: 0 10*3/UL
NRBC BLD AUTO-RTO: 0 /100
OPIATES UR QL SCN: NEGATIVE
PLATELET # BLD AUTO: 338 10E9/L (ref 150–450)
POTASSIUM SERPL-SCNC: 3.7 MMOL/L (ref 3.4–5.3)
PROT SERPL-MCNC: 8.1 G/DL (ref 6.8–8.8)
RBC # BLD AUTO: 5.79 10E12/L (ref 4.4–5.9)
SODIUM SERPL-SCNC: 141 MMOL/L (ref 133–144)
WBC # BLD AUTO: 7.9 10E9/L (ref 4–11)

## 2020-11-04 PROCEDURE — 80307 DRUG TEST PRSMV CHEM ANLYZR: CPT | Performed by: EMERGENCY MEDICINE

## 2020-11-04 PROCEDURE — 80320 DRUG SCREEN QUANTALCOHOLS: CPT | Performed by: EMERGENCY MEDICINE

## 2020-11-04 PROCEDURE — 99285 EMERGENCY DEPT VISIT HI MDM: CPT | Mod: 25 | Performed by: EMERGENCY MEDICINE

## 2020-11-04 PROCEDURE — 96360 HYDRATION IV INFUSION INIT: CPT | Performed by: EMERGENCY MEDICINE

## 2020-11-04 PROCEDURE — 258N000003 HC RX IP 258 OP 636: Performed by: EMERGENCY MEDICINE

## 2020-11-04 PROCEDURE — 80053 COMPREHEN METABOLIC PANEL: CPT | Performed by: EMERGENCY MEDICINE

## 2020-11-04 PROCEDURE — 82075 ASSAY OF BREATH ETHANOL: CPT | Performed by: EMERGENCY MEDICINE

## 2020-11-04 PROCEDURE — 99284 EMERGENCY DEPT VISIT MOD MDM: CPT | Performed by: EMERGENCY MEDICINE

## 2020-11-04 PROCEDURE — 85025 COMPLETE CBC W/AUTO DIFF WBC: CPT | Performed by: EMERGENCY MEDICINE

## 2020-11-04 RX ADMIN — SODIUM CHLORIDE 1000 ML: 9 INJECTION, SOLUTION INTRAVENOUS at 18:22

## 2020-11-04 NOTE — ED PROVIDER NOTES
"    [facility] EMERGENCY DEPARTMENT ([facility])  11/04/20    History     Chief Complaint   Patient presents with     Alcohol Problem     sober for a few months, relapsed two months ago, drinking vodka, goes on binges, last drink aroiund 1500, history of seizures, hasn't had a seizure for a few years     Suicidal     suicidal with a plan, states would \"hang it up,\" rates intentions at 10/10     HPI  Martín Shoemaker is a 29 year old male with a history of HTN, alcohol abuse, and depressive disorder who presents for evaluation of suicidal ideation and alcohol intoxication.  He states that he has been drinking for a few months.  He has been drinking vodka daily with his last drink this morning.  He has a prior history of alcohol withdrawal seizures but has not had a seizure in several years.  He reports occasional marijuana.  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  He denies any history of DTs.  He states he is suicidal but does not state a specific plan.        Past Medical History  Past Medical History:   Diagnosis Date     Depressive disorder      Hypertension     pt reports he takes meds for HTN     History reviewed. No pertinent surgical history.  No current outpatient medications on file.    No Known Allergies  Family History  History reviewed. No pertinent family history.  Social History   Social History     Tobacco Use     Smoking status: Light Tobacco Smoker     Smokeless tobacco: Current User   Substance Use Topics     Alcohol use: Yes     Comment: binge, relapsed a few months ago     Drug use: Yes     Comment: medical cannibus      Past medical history, past surgical history, medications, allergies, family history, and social history were reviewed with the patient. No additional pertinent items.       Review of Systems  A complete review of systems was performed with pertinent positives and negatives noted in the HPI, and all other systems negative.    Physical " Exam   BP: (!) 141/105  Pulse: 120  Temp: 97.4  F (36.3  C)  Resp: 16  Weight: 90.7 kg (200 lb)  SpO2: 95 %  Physical Exam  Vitals signs and nursing note reviewed.   Constitutional:       General: He is not in acute distress.     Appearance: He is not diaphoretic.      Comments: Alcohol intoxication   HENT:      Head: Normocephalic and atraumatic.   Neck:      Musculoskeletal: Normal range of motion and neck supple.   Cardiovascular:      Rate and Rhythm: Tachycardia present.   Pulmonary:      Effort: Pulmonary effort is normal. No respiratory distress.   Abdominal:      General: There is no distension.      Palpations: Abdomen is soft.      Tenderness: There is no abdominal tenderness. There is no guarding.   Musculoskeletal: Normal range of motion.         General: No tenderness.   Skin:     General: Skin is warm and dry.   Neurological:      Mental Status: He is alert and oriented to person, place, and time.      Sensory: No sensory deficit.   Psychiatric:      Comments: Suicidal ideation         ED Course      Procedures                         Results for orders placed or performed during the hospital encounter of 11/04/20   Alcohol breath test POCT     Status: Abnormal   Result Value Ref Range    Alcohol Breath Test 0.407 (A) 0.00 - 0.01     Medications - No data to display     Assessments & Plan (with Medical Decision Making)   30 yo M who presents with alcohol intoxication and suicidal ideation.  ABT was 0.407.  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  He has been coping by drinking alcohol. Martín will be monitored until sober and then reevaluated in regards to his suicidal ideation.  Lab evaluation was normal.       I have reviewed the nursing notes. I have reviewed the findings, diagnosis, plan and need for follow up with the patient.    New Prescriptions    No medications on file       Final diagnoses:   None       --    [facility] EMERGENCY DEPARTMENT  11/4/2020      Sammy Riley MD  11/04/20 1948

## 2020-11-04 NOTE — ED AVS SNAPSHOT
[facility] Emergency Department  [address]  [place] [address]  Phone: [phone]  Fax: [phone]                                    Martín Shoemaker   MRN: [id number]    Department: [facility] Emergency Department   Date of Visit: 11/4/2020           After Visit Summary Signature Page    I have received my discharge instructions, and my questions have been answered. I have discussed any challenges I see with this plan with the nurse or doctor.    ..........................................................................................................................................  Patient/Patient Representative Signature      ..........................................................................................................................................  Patient Representative Print Name and Relationship to Patient    ..................................................               ................................................  Date                                   Time    ..........................................................................................................................................  Reviewed by Signature/Title    ...................................................              ..............................................  Date                                               Time          22EPIC Rev 08/18

## 2021-04-11 ENCOUNTER — APPOINTMENT (OUTPATIENT)
Dept: CT IMAGING | Facility: CLINIC | Age: 31
End: 2021-04-11
Attending: EMERGENCY MEDICINE
Payer: COMMERCIAL

## 2021-04-11 ENCOUNTER — HOSPITAL ENCOUNTER (INPATIENT)
Facility: CLINIC | Age: 31
LOS: 3 days | Discharge: HOME | End: 2021-04-15
Attending: EMERGENCY MEDICINE | Admitting: STUDENT IN AN ORGANIZED HEALTH CARE EDUCATION/TRAINING PROGRAM
Payer: COMMERCIAL

## 2021-04-11 DIAGNOSIS — F10.20 ALCOHOLISM (H): Primary | ICD-10-CM

## 2021-04-11 DIAGNOSIS — Z71.6 ENCOUNTER FOR TOBACCO USE CESSATION COUNSELING: ICD-10-CM

## 2021-04-11 DIAGNOSIS — Z91.81 HISTORY OF FALL: ICD-10-CM

## 2021-04-11 DIAGNOSIS — M25.571 PAIN IN JOINT, ANKLE AND FOOT, RIGHT: ICD-10-CM

## 2021-04-11 DIAGNOSIS — E87.6 HYPOKALEMIA: ICD-10-CM

## 2021-04-11 DIAGNOSIS — E87.1 HYPONATREMIA: ICD-10-CM

## 2021-04-11 DIAGNOSIS — K29.70 GASTRITIS WITHOUT BLEEDING, UNSPECIFIED CHRONICITY, UNSPECIFIED GASTRITIS TYPE: ICD-10-CM

## 2021-04-11 DIAGNOSIS — Z20.822 CONTACT WITH AND (SUSPECTED) EXPOSURE TO COVID-19: ICD-10-CM

## 2021-04-11 DIAGNOSIS — Z87.891 PERSONAL HISTORY OF TOBACCO USE, PRESENTING HAZARDS TO HEALTH: ICD-10-CM

## 2021-04-11 LAB — ALCOHOL BREATH TEST: 0.34 (ref 0–0.01)

## 2021-04-11 PROCEDURE — 83735 ASSAY OF MAGNESIUM: CPT | Performed by: EMERGENCY MEDICINE

## 2021-04-11 PROCEDURE — 87635 SARS-COV-2 COVID-19 AMP PRB: CPT | Performed by: EMERGENCY MEDICINE

## 2021-04-11 PROCEDURE — 96375 TX/PRO/DX INJ NEW DRUG ADDON: CPT | Performed by: EMERGENCY MEDICINE

## 2021-04-11 PROCEDURE — C9803 HOPD COVID-19 SPEC COLLECT: HCPCS | Performed by: EMERGENCY MEDICINE

## 2021-04-11 PROCEDURE — 80053 COMPREHEN METABOLIC PANEL: CPT | Performed by: EMERGENCY MEDICINE

## 2021-04-11 PROCEDURE — 82077 ASSAY SPEC XCP UR&BREATH IA: CPT | Performed by: EMERGENCY MEDICINE

## 2021-04-11 PROCEDURE — 85025 COMPLETE CBC W/AUTO DIFF WBC: CPT | Performed by: EMERGENCY MEDICINE

## 2021-04-11 PROCEDURE — 93005 ELECTROCARDIOGRAM TRACING: CPT | Performed by: EMERGENCY MEDICINE

## 2021-04-11 PROCEDURE — 70450 CT HEAD/BRAIN W/O DYE: CPT

## 2021-04-11 PROCEDURE — HZ2ZZZZ DETOXIFICATION SERVICES FOR SUBSTANCE ABUSE TREATMENT: ICD-10-PCS | Performed by: EMERGENCY MEDICINE

## 2021-04-11 PROCEDURE — 250N000011 HC RX IP 250 OP 636: Performed by: EMERGENCY MEDICINE

## 2021-04-11 PROCEDURE — 96365 THER/PROPH/DIAG IV INF INIT: CPT | Performed by: EMERGENCY MEDICINE

## 2021-04-11 PROCEDURE — 99285 EMERGENCY DEPT VISIT HI MDM: CPT | Performed by: EMERGENCY MEDICINE

## 2021-04-11 PROCEDURE — 96366 THER/PROPH/DIAG IV INF ADDON: CPT | Performed by: EMERGENCY MEDICINE

## 2021-04-11 PROCEDURE — 72125 CT NECK SPINE W/O DYE: CPT

## 2021-04-11 PROCEDURE — 85610 PROTHROMBIN TIME: CPT | Performed by: EMERGENCY MEDICINE

## 2021-04-11 PROCEDURE — 82075 ASSAY OF BREATH ETHANOL: CPT | Performed by: EMERGENCY MEDICINE

## 2021-04-11 PROCEDURE — 99285 EMERGENCY DEPT VISIT HI MDM: CPT | Mod: 25 | Performed by: EMERGENCY MEDICINE

## 2021-04-11 RX ORDER — OLANZAPINE 10 MG/1
10 TABLET ORAL AT BEDTIME
COMMUNITY
End: 2021-04-22

## 2021-04-11 RX ORDER — ONDANSETRON 4 MG/1
4 TABLET, ORALLY DISINTEGRATING ORAL ONCE
Status: COMPLETED | OUTPATIENT
Start: 2021-04-11 | End: 2021-04-11

## 2021-04-11 RX ORDER — HYDROXYZINE HYDROCHLORIDE 25 MG/1
25-50 TABLET, FILM COATED ORAL 3 TIMES DAILY PRN
COMMUNITY
End: 2021-05-06

## 2021-04-11 RX ORDER — LORAZEPAM 1 MG/1
1-4 TABLET ORAL EVERY 30 MIN PRN
Status: DISCONTINUED | OUTPATIENT
Start: 2021-04-11 | End: 2021-04-15 | Stop reason: HOSPADM

## 2021-04-11 RX ADMIN — ONDANSETRON 4 MG: 4 TABLET, ORALLY DISINTEGRATING ORAL at 23:21

## 2021-04-11 ASSESSMENT — ENCOUNTER SYMPTOMS
HEADACHES: 0
VOMITING: 1

## 2021-04-12 PROBLEM — E87.1 HYPONATREMIA: Status: ACTIVE | Noted: 2021-04-12

## 2021-04-12 PROBLEM — E87.6 HYPOKALEMIA: Status: ACTIVE | Noted: 2021-04-12

## 2021-04-12 LAB
ALBUMIN SERPL-MCNC: 3.6 G/DL (ref 3.4–5)
ALBUMIN SERPL-MCNC: 5 G/DL (ref 3.4–5)
ALP SERPL-CCNC: 112 U/L (ref 40–150)
ALP SERPL-CCNC: 154 U/L (ref 40–150)
ALT SERPL W P-5'-P-CCNC: 251 U/L (ref 0–70)
ALT SERPL W P-5'-P-CCNC: 325 U/L (ref 0–70)
ANION GAP SERPL CALCULATED.3IONS-SCNC: 12 MMOL/L (ref 3–14)
ANION GAP SERPL CALCULATED.3IONS-SCNC: 13 MMOL/L (ref 3–14)
ANION GAP SERPL CALCULATED.3IONS-SCNC: 19 MMOL/L (ref 3–14)
ANION GAP SERPL CALCULATED.3IONS-SCNC: 8 MMOL/L (ref 3–14)
APAP SERPL-MCNC: <2 MG/L (ref 10–20)
AST SERPL W P-5'-P-CCNC: 394 U/L (ref 0–45)
AST SERPL W P-5'-P-CCNC: 518 U/L (ref 0–45)
BASOPHILS # BLD AUTO: 0 10E9/L (ref 0–0.2)
BASOPHILS NFR BLD AUTO: 0.1 %
BILIRUB SERPL-MCNC: 0.7 MG/DL (ref 0.2–1.3)
BILIRUB SERPL-MCNC: 1.1 MG/DL (ref 0.2–1.3)
BUN SERPL-MCNC: 11 MG/DL (ref 7–30)
BUN SERPL-MCNC: 12 MG/DL (ref 7–30)
BUN SERPL-MCNC: 14 MG/DL (ref 7–30)
BUN SERPL-MCNC: 8 MG/DL (ref 7–30)
CALCIUM SERPL-MCNC: 7.7 MG/DL (ref 8.5–10.1)
CALCIUM SERPL-MCNC: 8.2 MG/DL (ref 8.5–10.1)
CALCIUM SERPL-MCNC: 8.3 MG/DL (ref 8.5–10.1)
CALCIUM SERPL-MCNC: 8.9 MG/DL (ref 8.5–10.1)
CHLORIDE SERPL-SCNC: 68 MMOL/L (ref 94–109)
CHLORIDE SERPL-SCNC: 76 MMOL/L (ref 94–109)
CHLORIDE SERPL-SCNC: 83 MMOL/L (ref 94–109)
CHLORIDE SERPL-SCNC: 86 MMOL/L (ref 94–109)
CK SERPL-CCNC: 201 U/L (ref 30–300)
CO2 SERPL-SCNC: 31 MMOL/L (ref 20–32)
CO2 SERPL-SCNC: 34 MMOL/L (ref 20–32)
CO2 SERPL-SCNC: 35 MMOL/L (ref 20–32)
CO2 SERPL-SCNC: 36 MMOL/L (ref 20–32)
CREAT SERPL-MCNC: 0.54 MG/DL (ref 0.66–1.25)
CREAT SERPL-MCNC: 0.6 MG/DL (ref 0.66–1.25)
CREAT SERPL-MCNC: 0.63 MG/DL (ref 0.66–1.25)
CREAT SERPL-MCNC: 0.65 MG/DL (ref 0.66–1.25)
DIFFERENTIAL METHOD BLD: ABNORMAL
EOSINOPHIL # BLD AUTO: 0 10E9/L (ref 0–0.7)
EOSINOPHIL NFR BLD AUTO: 0 %
ERYTHROCYTE [DISTWIDTH] IN BLOOD BY AUTOMATED COUNT: 12.3 % (ref 10–15)
ERYTHROCYTE [DISTWIDTH] IN BLOOD BY AUTOMATED COUNT: 12.4 % (ref 10–15)
ETHANOL SERPL-MCNC: 0.42 G/DL
GFR SERPL CREATININE-BSD FRML MDRD: >90 ML/MIN/{1.73_M2}
GLUCOSE SERPL-MCNC: 110 MG/DL (ref 70–99)
GLUCOSE SERPL-MCNC: 113 MG/DL (ref 70–99)
GLUCOSE SERPL-MCNC: 157 MG/DL (ref 70–99)
GLUCOSE SERPL-MCNC: 170 MG/DL (ref 70–99)
HCT VFR BLD AUTO: 39.5 % (ref 40–53)
HCT VFR BLD AUTO: 47.6 % (ref 40–53)
HGB BLD-MCNC: 14.3 G/DL (ref 13.3–17.7)
HGB BLD-MCNC: 17.6 G/DL (ref 13.3–17.7)
IMM GRANULOCYTES # BLD: 0 10E9/L (ref 0–0.4)
IMM GRANULOCYTES NFR BLD: 0.4 %
INR PPP: 0.88 (ref 0.86–1.14)
INTERPRETATION ECG - MUSE: NORMAL
LABORATORY COMMENT REPORT: NORMAL
LYMPHOCYTES # BLD AUTO: 0.7 10E9/L (ref 0.8–5.3)
LYMPHOCYTES NFR BLD AUTO: 10.3 %
MAGNESIUM SERPL-MCNC: 1.9 MG/DL (ref 1.6–2.3)
MAGNESIUM SERPL-MCNC: 2.1 MG/DL (ref 1.6–2.3)
MCH RBC QN AUTO: 28.8 PG (ref 26.5–33)
MCH RBC QN AUTO: 29.2 PG (ref 26.5–33)
MCHC RBC AUTO-ENTMCNC: 36.2 G/DL (ref 31.5–36.5)
MCHC RBC AUTO-ENTMCNC: 37 G/DL (ref 31.5–36.5)
MCV RBC AUTO: 79 FL (ref 78–100)
MCV RBC AUTO: 80 FL (ref 78–100)
MONOCYTES # BLD AUTO: 0.5 10E9/L (ref 0–1.3)
MONOCYTES NFR BLD AUTO: 7.2 %
NEUTROPHILS # BLD AUTO: 5.5 10E9/L (ref 1.6–8.3)
NEUTROPHILS NFR BLD AUTO: 82 %
NRBC # BLD AUTO: 0 10*3/UL
NRBC BLD AUTO-RTO: 0 /100
PHOSPHATE SERPL-MCNC: 2.8 MG/DL (ref 2.5–4.5)
PLATELET # BLD AUTO: 123 10E9/L (ref 150–450)
PLATELET # BLD AUTO: 75 10E9/L (ref 150–450)
POTASSIUM SERPL-SCNC: 2.6 MMOL/L (ref 3.4–5.3)
POTASSIUM SERPL-SCNC: 2.8 MMOL/L (ref 3.4–5.3)
POTASSIUM SERPL-SCNC: 3.2 MMOL/L (ref 3.4–5.3)
POTASSIUM SERPL-SCNC: 3.3 MMOL/L (ref 3.4–5.3)
PROT SERPL-MCNC: 6.8 G/DL (ref 6.8–8.8)
PROT SERPL-MCNC: 9 G/DL (ref 6.8–8.8)
RBC # BLD AUTO: 4.96 10E12/L (ref 4.4–5.9)
RBC # BLD AUTO: 6.03 10E12/L (ref 4.4–5.9)
SALICYLATES SERPL-MCNC: <2 MG/DL
SARS-COV-2 RNA RESP QL NAA+PROBE: NEGATIVE
SODIUM SERPL-SCNC: 123 MMOL/L (ref 133–144)
SODIUM SERPL-SCNC: 123 MMOL/L (ref 133–144)
SODIUM SERPL-SCNC: 126 MMOL/L (ref 133–144)
SODIUM SERPL-SCNC: 129 MMOL/L (ref 133–144)
SPECIMEN SOURCE: NORMAL
WBC # BLD AUTO: 5 10E9/L (ref 4–11)
WBC # BLD AUTO: 6.7 10E9/L (ref 4–11)

## 2021-04-12 PROCEDURE — 80048 BASIC METABOLIC PNL TOTAL CA: CPT | Performed by: INTERNAL MEDICINE

## 2021-04-12 PROCEDURE — 84100 ASSAY OF PHOSPHORUS: CPT | Performed by: STUDENT IN AN ORGANIZED HEALTH CARE EDUCATION/TRAINING PROGRAM

## 2021-04-12 PROCEDURE — 36415 COLL VENOUS BLD VENIPUNCTURE: CPT | Performed by: INTERNAL MEDICINE

## 2021-04-12 PROCEDURE — 83735 ASSAY OF MAGNESIUM: CPT | Performed by: STUDENT IN AN ORGANIZED HEALTH CARE EDUCATION/TRAINING PROGRAM

## 2021-04-12 PROCEDURE — 85027 COMPLETE CBC AUTOMATED: CPT | Performed by: STUDENT IN AN ORGANIZED HEALTH CARE EDUCATION/TRAINING PROGRAM

## 2021-04-12 PROCEDURE — 250N000013 HC RX MED GY IP 250 OP 250 PS 637: Performed by: INTERNAL MEDICINE

## 2021-04-12 PROCEDURE — 99223 1ST HOSP IP/OBS HIGH 75: CPT | Mod: AI | Performed by: STUDENT IN AN ORGANIZED HEALTH CARE EDUCATION/TRAINING PROGRAM

## 2021-04-12 PROCEDURE — 96365 THER/PROPH/DIAG IV INF INIT: CPT | Performed by: EMERGENCY MEDICINE

## 2021-04-12 PROCEDURE — 250N000013 HC RX MED GY IP 250 OP 250 PS 637: Performed by: EMERGENCY MEDICINE

## 2021-04-12 PROCEDURE — 82550 ASSAY OF CK (CPK): CPT | Performed by: STUDENT IN AN ORGANIZED HEALTH CARE EDUCATION/TRAINING PROGRAM

## 2021-04-12 PROCEDURE — 80143 DRUG ASSAY ACETAMINOPHEN: CPT | Performed by: STUDENT IN AN ORGANIZED HEALTH CARE EDUCATION/TRAINING PROGRAM

## 2021-04-12 PROCEDURE — 258N000003 HC RX IP 258 OP 636: Performed by: EMERGENCY MEDICINE

## 2021-04-12 PROCEDURE — 80179 DRUG ASSAY SALICYLATE: CPT | Performed by: STUDENT IN AN ORGANIZED HEALTH CARE EDUCATION/TRAINING PROGRAM

## 2021-04-12 PROCEDURE — 250N000009 HC RX 250: Performed by: EMERGENCY MEDICINE

## 2021-04-12 PROCEDURE — 120N000002 HC R&B MED SURG/OB UMMC

## 2021-04-12 PROCEDURE — 80053 COMPREHEN METABOLIC PANEL: CPT | Performed by: STUDENT IN AN ORGANIZED HEALTH CARE EDUCATION/TRAINING PROGRAM

## 2021-04-12 PROCEDURE — 99207 PR CDG-CODE CATEGORY CHANGED: CPT | Performed by: STUDENT IN AN ORGANIZED HEALTH CARE EDUCATION/TRAINING PROGRAM

## 2021-04-12 PROCEDURE — 36415 COLL VENOUS BLD VENIPUNCTURE: CPT | Performed by: STUDENT IN AN ORGANIZED HEALTH CARE EDUCATION/TRAINING PROGRAM

## 2021-04-12 PROCEDURE — 258N000001 HC RX 258: Performed by: EMERGENCY MEDICINE

## 2021-04-12 PROCEDURE — 96375 TX/PRO/DX INJ NEW DRUG ADDON: CPT | Performed by: EMERGENCY MEDICINE

## 2021-04-12 PROCEDURE — 250N000013 HC RX MED GY IP 250 OP 250 PS 637: Performed by: STUDENT IN AN ORGANIZED HEALTH CARE EDUCATION/TRAINING PROGRAM

## 2021-04-12 PROCEDURE — 96366 THER/PROPH/DIAG IV INF ADDON: CPT | Performed by: EMERGENCY MEDICINE

## 2021-04-12 PROCEDURE — 250N000011 HC RX IP 250 OP 636: Performed by: EMERGENCY MEDICINE

## 2021-04-12 PROCEDURE — 258N000003 HC RX IP 258 OP 636: Performed by: STUDENT IN AN ORGANIZED HEALTH CARE EDUCATION/TRAINING PROGRAM

## 2021-04-12 RX ORDER — LANOLIN ALCOHOL/MO/W.PET/CERES
100 CREAM (GRAM) TOPICAL DAILY
Status: DISCONTINUED | OUTPATIENT
Start: 2021-04-13 | End: 2021-04-15 | Stop reason: HOSPADM

## 2021-04-12 RX ORDER — NICOTINE 21 MG/24HR
1 PATCH, TRANSDERMAL 24 HOURS TRANSDERMAL DAILY
Status: DISCONTINUED | OUTPATIENT
Start: 2021-04-12 | End: 2021-04-15 | Stop reason: HOSPADM

## 2021-04-12 RX ORDER — MAGNESIUM SULFATE HEPTAHYDRATE 40 MG/ML
2 INJECTION, SOLUTION INTRAVENOUS ONCE
Status: COMPLETED | OUTPATIENT
Start: 2021-04-12 | End: 2021-04-12

## 2021-04-12 RX ORDER — HYDROXYZINE HYDROCHLORIDE 25 MG/1
25 TABLET, FILM COATED ORAL 3 TIMES DAILY PRN
Status: DISCONTINUED | OUTPATIENT
Start: 2021-04-12 | End: 2021-04-12

## 2021-04-12 RX ORDER — LORAZEPAM 2 MG/ML
0.5 INJECTION INTRAMUSCULAR
Status: DISCONTINUED | OUTPATIENT
Start: 2021-04-12 | End: 2021-04-12

## 2021-04-12 RX ORDER — MULTIVITAMIN,THERAPEUTIC
1 TABLET ORAL DAILY
Status: DISCONTINUED | OUTPATIENT
Start: 2021-04-13 | End: 2021-04-15 | Stop reason: HOSPADM

## 2021-04-12 RX ORDER — POTASSIUM CHLORIDE 20MEQ/15ML
40 LIQUID (ML) ORAL ONCE
Status: COMPLETED | OUTPATIENT
Start: 2021-04-12 | End: 2021-04-12

## 2021-04-12 RX ORDER — MULTIPLE VITAMINS W/ MINERALS TAB 9MG-400MCG
1 TAB ORAL DAILY
COMMUNITY
End: 2021-05-06

## 2021-04-12 RX ORDER — FERROUS SULFATE 325(65) MG
325 TABLET, DELAYED RELEASE (ENTERIC COATED) ORAL DAILY
COMMUNITY
End: 2021-05-06

## 2021-04-12 RX ORDER — POTASSIUM CHLORIDE 1.5 G/1.58G
40 POWDER, FOR SOLUTION ORAL ONCE
Status: COMPLETED | OUTPATIENT
Start: 2021-04-12 | End: 2021-04-12

## 2021-04-12 RX ORDER — TRAZODONE HYDROCHLORIDE 50 MG/1
50-100 TABLET, FILM COATED ORAL
Status: ON HOLD | COMMUNITY
End: 2021-07-01

## 2021-04-12 RX ORDER — POTASSIUM CHLORIDE 750 MG/1
40 TABLET, EXTENDED RELEASE ORAL ONCE
Status: COMPLETED | OUTPATIENT
Start: 2021-04-12 | End: 2021-04-13

## 2021-04-12 RX ORDER — FOLIC ACID 1 MG/1
1 TABLET ORAL DAILY
Status: DISCONTINUED | OUTPATIENT
Start: 2021-04-13 | End: 2021-04-15 | Stop reason: HOSPADM

## 2021-04-12 RX ORDER — POTASSIUM CHLORIDE 750 MG/1
40 TABLET, EXTENDED RELEASE ORAL ONCE
Status: COMPLETED | OUTPATIENT
Start: 2021-04-12 | End: 2021-04-12

## 2021-04-12 RX ORDER — HYDROXYZINE HYDROCHLORIDE 25 MG/1
25 TABLET, FILM COATED ORAL 3 TIMES DAILY PRN
Status: DISCONTINUED | OUTPATIENT
Start: 2021-04-12 | End: 2021-04-15 | Stop reason: HOSPADM

## 2021-04-12 RX ORDER — OLANZAPINE 10 MG/1
10 TABLET ORAL AT BEDTIME
Status: DISCONTINUED | OUTPATIENT
Start: 2021-04-12 | End: 2021-04-15 | Stop reason: HOSPADM

## 2021-04-12 RX ORDER — OLANZAPINE 5 MG/1
10 TABLET, ORALLY DISINTEGRATING ORAL ONCE
Status: COMPLETED | OUTPATIENT
Start: 2021-04-12 | End: 2021-04-12

## 2021-04-12 RX ORDER — POTASSIUM CHLORIDE 1.5 G/1.58G
40 POWDER, FOR SOLUTION ORAL ONCE
Status: DISCONTINUED | OUTPATIENT
Start: 2021-04-12 | End: 2021-04-12

## 2021-04-12 RX ORDER — SODIUM CHLORIDE 9 MG/ML
INJECTION, SOLUTION INTRAVENOUS
Status: DISPENSED
Start: 2021-04-12 | End: 2021-04-13

## 2021-04-12 RX ORDER — SODIUM CHLORIDE 9 MG/ML
INJECTION, SOLUTION INTRAVENOUS CONTINUOUS
Status: DISCONTINUED | OUTPATIENT
Start: 2021-04-12 | End: 2021-04-14

## 2021-04-12 RX ADMIN — LORAZEPAM 2 MG: 1 TABLET ORAL at 17:08

## 2021-04-12 RX ADMIN — POTASSIUM CHLORIDE 40 MEQ: 1.5 FOR SOLUTION ORAL at 11:09

## 2021-04-12 RX ADMIN — POTASSIUM CHLORIDE 40 MEQ: 40 SOLUTION ORAL at 07:04

## 2021-04-12 RX ADMIN — POTASSIUM CHLORIDE 40 MEQ: 40 SOLUTION ORAL at 01:20

## 2021-04-12 RX ADMIN — SODIUM CHLORIDE: 9 INJECTION, SOLUTION INTRAVENOUS at 11:16

## 2021-04-12 RX ADMIN — SODIUM CHLORIDE: 9 INJECTION, SOLUTION INTRAVENOUS at 03:33

## 2021-04-12 RX ADMIN — FOLIC ACID: 5 INJECTION, SOLUTION INTRAMUSCULAR; INTRAVENOUS; SUBCUTANEOUS at 02:33

## 2021-04-12 RX ADMIN — MAGNESIUM SULFATE 2 G: 2 INJECTION INTRAVENOUS at 00:49

## 2021-04-12 RX ADMIN — SODIUM CHLORIDE 1000 ML: 9 INJECTION, SOLUTION INTRAVENOUS at 00:49

## 2021-04-12 RX ADMIN — OLANZAPINE 10 MG: 10 TABLET, FILM COATED ORAL at 22:39

## 2021-04-12 RX ADMIN — SODIUM CHLORIDE: 9 INJECTION, SOLUTION INTRAVENOUS at 19:57

## 2021-04-12 RX ADMIN — LORAZEPAM 1 MG: 1 TABLET ORAL at 01:53

## 2021-04-12 RX ADMIN — POTASSIUM CHLORIDE 40 MEQ: 750 TABLET, EXTENDED RELEASE ORAL at 17:38

## 2021-04-12 RX ADMIN — OLANZAPINE 10 MG: 5 TABLET, ORALLY DISINTEGRATING ORAL at 04:41

## 2021-04-12 ASSESSMENT — MIFFLIN-ST. JEOR: SCORE: 1821.48

## 2021-04-12 NOTE — H&P
[facility]    History and Physical - Hospitalist Service       Date of Admission:  4/11/2021    Assessment & Plan   Martín Shoemaker is a 30 year old male admitted on 4/11/2021. He expressed wishes to safely detox, however he has severe electrolyte abnormalities including hyponatremia and hypokalemia, QT prolongation, and likely acute alcoholic hepatitis.      #Hyponatremia: Moderate  Likely onset over the last 1-2 weeks given patient's history of vomiting poor PO intake.  History of hyponatremia with EtOH use, but was normonatremic on month prior to admission at an outside facility.  Not obviously symptomatic. Received 2L NS in ED, on maintenance fluids.    - Target correction of 4-6mEq over the next 24h.   - Recheck Na in AM    #Hypokalemia  #Metabolic alkalosis   Secondary to emesis associated with acute EtOH. S/p 40mEq KCl in ED.  - Additional 40 mEq KCl PO  - recheck K in AM    #Alcohol use disorder  Patient expressed wish to discontinue alcohol use  - Consult chem dep when patient medically ready    #Acute hepatitis:   History of mild elevation of transaminases, though current rise more significant than previous.  AST>ALT consistent with EtOH.  No behavioral risk factors consistent with increased risk for viral hepatitis.  - CK to clarify rise in AST  - APAP and salycilate level ordered given patient's inability to give complete hx  - INR for synthetic function  - Trend transaminases.    #QTc prolongation  Likely secondary to acute intoxication and hypokalemia. Present on previous hospitalization in 1/2021, but had resolved on more recent EKG in 3/2021 when normokalemic.  Patient is on quetiapine but denies ingestion beyond prescribed dose, unlikely to be contributing.   - Tele monitoring on admission  - Recheck EKG tomorrow    #Refeeding Risk  Habitus with some muscle wasting on upper and lower extremities, history of no eating for at least one week,   - Prealbumin  ordered  - Daily Na/K/Phos/Mg    #Thrombocytopenia: chronic        Diet:   Adult diet  DVT Prophylaxis: Ambulate every shift  Bauman Catheter: not present  Code Status:   Full code until patient is sober and able to participate in code discussion         Disposition Plan   Expected discharge: 2 - 3 days, recommended to detox once mental status at baseline and safe disposition plan/ TCU bed available.  Entered: Derrek Bob MD 04/12/2021, 2:00 AM     The patient's care was discussed with the Patient.    Derrek Bob MD  [facility]  Contact information available via [facility] Paging/Directory      ______________________________________________________________________    Chief Complaint   Acute alcohol intoxication    History is obtained from the patient and the past medical records    History of Present Illness   Martín Shoemaker is a 30 year old male with a history of depression, esophogeal ulcers, and alcohol use disorder previously complicated by withdrawal seizures and DTs, who presented to the ED for evaluation of acute alcohol intoxication.    The patient reports that he has been drinking on and off over the past few weeks, for for at least a week it has been 1 L of vodka per. His last drink was in the afternoon prior to arrival.    He notes drinking has been accompanied by frequent vomiting, including some coffee-ground emesis, but no bright red hematemesis.  He acknowledges a number of falls, which she does not quite remember, including one earlier today where he landed on his face producing a large bruise around his eye.  He denies any associated change in vision or blurred vision.  Denies any loss of consciousness.  He endorses a persistent headache over the past few weeks while drinking, not acutely worse in the last 1-2 days or impacted by the described fall.  He has been drinking to manage headaches, no acetaminophen or ibuprofen use.  Denies abdominal  "pain, no change in stools, no melena, although he also acknowledges that he has not eaten any food in the last week.    Of note, the patient does note a history of withdrawal seizures and delirium tremens, for which he was hospitalized at another facilities earlier this year. He was admitted from 1/15-1/19/2021 following a withdrawal seizure and GI bleeding; he was discharged on levetiracetam but has since discontinued any seizure ppx.  More recently, he was hospitalized 3/17-22 for acute paranoia with hallucinations felt to be consistent with delerium tremens and discharged on olanzapine, which he has continued to take.     In the ED, he was found to be dehydrated and tachycardic with notable hyponatremia, hypokalemia, and a metabolic alkalosis likely secondary to recurrent vomiting.  He was given 1 L NS followed by banana bag, as well as 40 mEq potassium.  He was also noted to be acutely intoxicated, and given history of withdrawal seizures, started on MSSA protocol, but no benzodiazepines given.    Review of Systems    ROS notable for headache as noted above.  No fevers, sweats, chills, although he acknowledges he has been intoxicated for most of the last couple weeks and consequently \"feels shitty\".  Denies any lightheadedness or dizziness, no change in vision or blurred vision, no difficulty swallowing.  Denies shortness of breath, difficulty breathing, chest pain, palpitations.  No abdominal pain, no change in urinary frequency or color, continues to have normal stools (most recently 2 days prior to presentation).  Denies any injury aside from a few scrapes and bruises, denies swelling in his hands and feet.    Past Medical History    I have reviewed this patient's medical history and updated it with pertinent information if needed.   Past Medical History:   Diagnosis Date     Depressive disorder      Hypertension     pt reports he takes meds for HTN     Substance abuse (H)      Ulcer, gastric, acute  "       Past Surgical History   I have reviewed this patient's surgical history and updated it with pertinent information if needed.  History reviewed. No pertinent surgical history.    Social History   I have reviewed this patient's social history and updated it with pertinent information if needed.  Social History     Tobacco Use     Smoking status: Light Tobacco Smoker     Smokeless tobacco: Current User     Types: Chew   Substance Use Topics     Alcohol use: Yes     Comment: binge, relapsed a few months ago     Drug use: Yes     Types: Marijuana     Comment: medical cannibus       Family History   Family history of hypertension    Prior to Admission Medications   Prior to Admission Medications   Prescriptions Last Dose Informant Patient Reported? Taking?   OLANZapine (ZYPREXA) 10 MG tablet Past Week at Unknown time  Yes Yes   Sig: Take 10 mg by mouth At Bedtime   hydrOXYzine (ATARAX) 25 MG tablet 4/11/2021 at Unknown time  Yes Yes   Sig: Take 25 mg by mouth 3 times daily as needed for itching      Facility-Administered Medications: None     Allergies   No Known Allergies    Physical Exam   Vital Signs: Temp: 98.3  F (36.8  C) Temp src: Oral BP: 115/82 Pulse: 105   Resp: 16 SpO2: 97 % O2 Device: None (Room air)    Weight: 0 lbs 0 oz    General Appearance: Tired and somewhat disheveled appearing.   Eyes: Symmetric conjunctival injection without discharge. EOMI, PERRL  HEENT: Large bruise over R superior orbit, poor dentition, moist mucous membranes  Respiratory: CTAB, breathing comfortably on RA  Cardiovascular: Tachycardic with regular rhythm, no m/r/g. Peripheral pulses symmetric  GI: Abdomen soft, NT/ND, no gross organomegaly  Lymph/Hematologic: No significant cervical lymphadenopathy  Genitourinary: deferred  Skin: Scrape over L hand appears fresh  Musculoskeletal: Overall upper and lower extremity muscle mass somewhat diminished relative to habitus  Neurologic: Alert and oriented, speaking clearly, CN2-12  intact.  Psychiatric: Acutely intoxicated and emotionally labile throughout history and exam    Data   Data reviewed today: I reviewed all medications, new labs and imaging results over the last 24 hours. I personally reviewed the EKG tracing showing the following: Sinus rhythm with QT prolongation.   Vent. rate 98 BPM  NJ interval 170 ms  QRS duration 100 ms  QT/QTc 404/515 ms  P-R-T axes 46 79 46    Recent Labs   Lab 04/11/21  2345   WBC 6.7   HGB 17.6   MCV 79   *   *   POTASSIUM 2.6*   CHLORIDE 68*   CO2 36*   BUN 14   CR 0.63*   ANIONGAP 19*   EMILEE 8.9   *   ALBUMIN 5.0   PROTTOTAL 9.0*   BILITOTAL 1.1   ALKPHOS 154*   *   *     Recent Results (from the past 24 hour(s))   CT Head w/o Contrast    Narrative    EXAM: CT HEAD W/O CONTRAST, CT CERVICAL SPINE W/O CONTRAST  LOCATION: [facility]  DATE/TIME: 4/11/2021 11:52 PM    INDICATION: Head and neck injury  COMPARISON: None.  TECHNIQUE:   1) Routine CT Head without IV contrast. Multiplanar reformats. Dose reduction techniques were used.  2) Routine CT Cervical Spine without IV contrast. Multiplanar reformats. Dose reduction techniques were used.    FINDINGS:   HEAD CT:   INTRACRANIAL CONTENTS: No intracranial hemorrhage, extraaxial collection, or mass effect.  No CT evidence of acute infarct. Normal parenchymal attenuation. Normal ventricles and sulci.     VISUALIZED ORBITS/SINUSES/MASTOIDS: No intraorbital abnormality. No paranasal sinus mucosal disease. No middle ear or mastoid effusion.    BONES/SOFT TISSUES: Small amount of soft tissue swelling overlying lateral aspect of right orbit.    CERVICAL SPINE CT:   VERTEBRA: Normal vertebral body heights. Straightening of cervical lordosis. Slight left cervical curve. No fracture or posttraumatic subluxation.     CANAL/FORAMINA: No canal or neural foraminal stenosis.    PARASPINAL: No extraspinal abnormality. Visualized lung fields are clear.      Impression     IMPRESSION:  HEAD CT:  1.  Normal head CT.    CERVICAL SPINE CT:  1.  No CT evidence for acute fracture or post traumatic subluxation.   Cervical spine CT w/o contrast    Narrative    EXAM: CT HEAD W/O CONTRAST, CT CERVICAL SPINE W/O CONTRAST  LOCATION: [facility]  DATE/TIME: 4/11/2021 11:52 PM    INDICATION: Head and neck injury  COMPARISON: None.  TECHNIQUE:   1) Routine CT Head without IV contrast. Multiplanar reformats. Dose reduction techniques were used.  2) Routine CT Cervical Spine without IV contrast. Multiplanar reformats. Dose reduction techniques were used.    FINDINGS:   HEAD CT:   INTRACRANIAL CONTENTS: No intracranial hemorrhage, extraaxial collection, or mass effect.  No CT evidence of acute infarct. Normal parenchymal attenuation. Normal ventricles and sulci.     VISUALIZED ORBITS/SINUSES/MASTOIDS: No intraorbital abnormality. No paranasal sinus mucosal disease. No middle ear or mastoid effusion.    BONES/SOFT TISSUES: Small amount of soft tissue swelling overlying lateral aspect of right orbit.    CERVICAL SPINE CT:   VERTEBRA: Normal vertebral body heights. Straightening of cervical lordosis. Slight left cervical curve. No fracture or posttraumatic subluxation.     CANAL/FORAMINA: No canal or neural foraminal stenosis.    PARASPINAL: No extraspinal abnormality. Visualized lung fields are clear.      Impression    IMPRESSION:  HEAD CT:  1.  Normal head CT.    CERVICAL SPINE CT:  1.  No CT evidence for acute fracture or post traumatic subluxation.

## 2021-04-12 NOTE — PROGRESS NOTES
Patient is A&O x4, VSS. Denied CP, pain, lightheadedness, dizziness, numbness, tingling and SOB. MSSA score were 5, done twice a shift. NS infusing via right lower arm PIV line. Low potassium, Dr. Hernandez notified and a one time dose of potassium given. Potassium lab recheck order placed per Dr. Hernandez for this afternoon. Pt is on regular diet, refused to eat breakfast and lunch, reported that he is not hungry. Denied N/V. Pt slept majority of the shift. Self repositioned and turned in bed. On telemetry monitoring. Able to use call light appropriately and make needs known, will continue to monitor patient as POC.   .

## 2021-04-12 NOTE — ED PROVIDER NOTES
[facility] EMERGENCY DEPARTMENT ([facility])  4/11/21  History     Chief Complaint   Patient presents with     Alcohol Intoxication     Detox from alcohol, drinking one pint of vodka daily, last drink prior to arrival.     The history is provided by the patient and medical records.     Martín Shoemaker is a 30 year old male with a history of HTN, alcohol abuse, cocaine abuse, seizure disorder and withdrawal seizures who presents to the ED for evaluation of alcohol intoxication.  Patient reports drinking 1 L of vodka daily.  His last drink this afternoon prior to arrival.  The patient did sustain a fall from standing earlier this afternoon.  He denies losing consciousness and is able to recollect the entire event.  Patient denies any headache.  Patient does note several episodes of vomiting.  Patient did sustain a abrasion to his right but is unable to state if that was from today or any previous fall.  Patient does note a history of withdrawal seizures and delirium tremens. The patient was most recently admitted from 1/15-1/19/2021 following a withdrawal seizure.  Patient was treated with benzodiazepines in the ED and started on IV proton pump inhibitor as well as IV octreotide infusion.    Past Medical History:   Diagnosis Date     Depressive disorder      Hypertension     pt reports he takes meds for HTN     Substance abuse (H)      Ulcer, gastric, acute        History reviewed. No pertinent surgical history.    History reviewed. No pertinent family history.    Social History     Tobacco Use     Smoking status: Light Tobacco Smoker     Smokeless tobacco: Current User     Types: Chew   Substance Use Topics     Alcohol use: Yes     Comment: binge, relapsed a few months ago     Current Facility-Administered Medications   Medication     LORazepam (ATIVAN) tablet 1-4 mg     Current Outpatient Medications   Medication     hydrOXYzine (ATARAX) 25 MG tablet     OLANZapine (ZYPREXA) 10 MG tablet      No Known  Allergies      Review of Systems   Unable to perform ROS: Acuity of condition   Gastrointestinal: Positive for vomiting.   Neurological: Negative for headaches.     A complete review of systems was performed with pertinent positives and negatives noted in the HPI, and all other systems negative.    Physical Exam   BP: 115/82  Pulse: 116  Temp: 98.3  F (36.8  C)  Resp: 18  SpO2: 97 %  Physical Exam  Nursing note reviewed.   Constitutional:       General: He is not in acute distress.     Appearance: He is not diaphoretic.   HENT:      Head:      Comments: Right-sided periorbital ecchymosis     Mouth/Throat:      Mouth: Mucous membranes are dry.   Eyes:      General: No scleral icterus.     Pupils: Pupils are equal, round, and reactive to light.   Cardiovascular:      Heart sounds: Normal heart sounds.   Pulmonary:      Effort: No respiratory distress.      Breath sounds: Normal breath sounds.   Abdominal:      General: Bowel sounds are normal.      Palpations: Abdomen is soft.      Tenderness: There is no abdominal tenderness.   Musculoskeletal:         General: No tenderness.   Skin:     General: Skin is warm.      Findings: No rash.           ED Course     11:25 PM  The patient was seen and examined by Dr. Johan Clayton in Room ED 09.     Procedures         Results for orders placed or performed during the hospital encounter of 04/11/21   Alcohol breath test POCT     Status: Abnormal   Result Value Ref Range    Alcohol Breath Test 0.338 (A) 0.00 - 0.01     Medications   ondansetron (ZOFRAN-ODT) ODT tab 4 mg (4 mg Oral Given 4/11/21 2352)        Assessments & Plan (with Medical Decision Making)     30 year old male with a history of HTN, alcohol abuse, cocaine abuse, seizure disorder and withdrawal seizures who presents to the ED for evaluation of alcohol intoxication.  Patient initially agreeable to alcohol detox medical screening labs obtained and revealed a potassium of 2.6, sodium 123, chloride 68, anion gap of 19.   Patient had transaminitis with ALT AST of 325/518.  Patient had normal CBC and a normal magnesium of 1.9, COVID-19 swab negative.  Patient sent to CT for imaging of the head and cervical spine which revealed no evidence of acute traumatic injury.  Patient placed on MSSA protocol with Ativan as needed, administered Zofran 4 mg ODT with adequate relief of his nausea.  Potassium repletion was initiated here in the emergency department and he was given a liter normal saline IV fluid bolus followed by banana bag and he was also given magnesium 2 g IV piggyback.  Case discussed with medicine service with agreement to admit for further evaluation and care.      I have reviewed the nursing notes. I have reviewed the findings, diagnosis, plan and need for follow up with the patient.    New Prescriptions    No medications on file       Final diagnoses:   Hyponatremia   Hypokalemia     ICole, am serving as a trained medical scribe to document services personally performed by Johan Clayton MD, based on the provider's statements to me.      IJohan MD, was physically present and have reviewed and verified the accuracy of this note documented by Cole Pratt.   --  Johan Clayton MD  [facility] EMERGENCY DEPARTMENT  4/11/2021     Johan Clayton MD  04/12/21 0109

## 2021-04-12 NOTE — PROGRESS NOTES
"  See history and physical by Derrek Bob MD for details.  Patient seen and evaluated.  Says doing well.  Nausea vomiting: Improving  MSSA: 5.9.  Hemodynamics stable.  Follow-up BMP: Sodium 126, potassium 3.3 \"improving.    Continue IV fluid, normal saline.  Replace potassium per protocol.  Follow-up BMP. Lytes. LFT.   Continue mssa protocol with lorazepam.  Antiemetics as needed  Multivitamin, thiamine, folic acid daily    and CD consultation.  Fall precaution.  Continue monitor.    No other changes made.    Oral Hernandez MD  [facility]  Contact information available via [facility] Paging/Directory  "

## 2021-04-12 NOTE — UTILIZATION REVIEW
[facility] Utilization Review  Admission Status; Secondary Review Determination     Admission Date: 4/11/2021 10:14 PM      Under the authority of the Utilization Management Committee, the utilization review process indicated a secondary review on the above patient.  The review outcome is based on review of the medical records, discussions with staff, and applying clinical experience noted on the date of the review.        (X)      Inpatient Status Appropriate - This patient's medical care is consistent with medical management for inpatient care and reasonable inpatient medical practice.          RATIONALE FOR DETERMINATION   30-year-old male with history of alcohol abuse with alcoholic hepatitis, admitted with severe electrolyte abnormalities including hyponatremia, hypokalemia, QT prolongation and acute alcoholic hepatitis.  Complex patient with alcoholic hepatitis, and severe electrolyte abnormalities, needs slow correction of sodium, aggressive potassium replacement, with close monitoring in the hospital and is at risk of acute decompensation, agree with inpatient status      The severity of illness, intensity of service provided, expected LOS and risk for adverse outcome make the care complex, high risk and appropriate for hospital admission.The patient requires hospital based medical care which is anticipated to require a stay of 2 or more midnights; according to CMS guidelines the patient should be admitted as inpatient        The information on this document is developed by the utilization review team in order for the business office to ensure compliance.  This only denotes the appropriateness of proper admission status and does not reflect the quality of care rendered.         The definitions of Inpatient Status and Observation Status used in making the determination above are those provided in the CMS Coverage Manual, Chapter 1 and Chapter 6, section 70.4.      Sincerely,       Renzo Boyd,  MD  Physician Advisor  Utilization Review-[place]    Phone: [phone]

## 2021-04-12 NOTE — ED TRIAGE NOTES
Drank 1L vodka today, fell outside his apartment, pt called his supervisor and supervisor brought patient to ER.  Pt states he relapsed due to past events.  Pt fell and hit his head on the floor at home.  Pt denies blacking out.

## 2021-04-12 NOTE — PLAN OF CARE
Pt arrived on unit at about 0310 in a wheelchair from the ED. Calm and stable. Pt was stable for a while and started to vomit. Informed MD and a one time dose of antiemetic was ordered. Pt on tele. Complaints of generalized body pain especially in his throat, chest, foot apparently from multiple falls due to alcohol intoxication. Will continue to monitor.

## 2021-04-12 NOTE — CONSULTS
"SW Progress Note    SW attempted to meet with pt in his room to do initial assessment. SW explained role here at the hospital. Pt stated he was feeling tired and not in the mood to talk. SW asked pt if he was interested in CD resources, to which he responded \"yes\" and was agreeable to have SW provide him with resource printouts. Printouts provided. ROBERT will attempt to meet with pt tomorrow for full assessment.     Cristy Raymond MSW, LGSW  [facility]      "

## 2021-04-12 NOTE — ED NOTES
Dr. Clayton notified of critical lab values (Potassium, AST, and blood alcohol level). MD will place orders.

## 2021-04-12 NOTE — PHARMACY-ADMISSION MEDICATION HISTORY
Admission Medication History Completed by Pharmacy    See [facility] Admission Navigator for allergy information, preferred outpatient pharmacy, prior to admission medications and immunization status.     Medication History Sources:     Patient and Surecripts    Changes made to PTA medication list (reason):    Added: trazodone, multivitamin and ferrous sulfate    Deleted: None    Changed: Hydroxyzine 25 mg po tid prn for itching >> 25-50 mg po tid prn for anxiety     Additional Information:    Patient has the pictures of prescription medication bottles (hydroxyzine, trazodone and Olanzapine) on his phone.    Patient stated that he sometimes takes multivitamins and OTC iron (he got it from [employer] but is not sure about the strength of iron).    Prior to Admission medications    Medication Sig Last Dose Taking? Auth Provider   ferrous sulfate (FE TABS) 325 (65 Fe) MG EC tablet Take 325 mg by mouth daily Past Week at Unknown time Yes Unknown, Entered By History   hydrOXYzine (ATARAX) 25 MG tablet Take 25-50 mg by mouth 3 times daily as needed for anxiety  4/11/2021 at Unknown time Yes Reported, Patient   multivitamin w/minerals (THERA-VIT-M) tablet Take 1 tablet by mouth daily Past Week at Unknown time Yes Unknown, Entered By History   OLANZapine (ZYPREXA) 10 MG tablet Take 10 mg by mouth At Bedtime Past Week at Unknown time Yes Reported, Patient   traZODone (DESYREL) 50 MG tablet Take  mg by mouth nightly as needed for sleep Past Week at Unknown time Yes Unknown, Entered By History       Date completed: 04/12/21    Medication history completed by: Jorge Pedersen RPH

## 2021-04-13 LAB
ALBUMIN SERPL-MCNC: 3.5 G/DL (ref 3.4–5)
ALP SERPL-CCNC: 112 U/L (ref 40–150)
ALT SERPL W P-5'-P-CCNC: 200 U/L (ref 0–70)
ANION GAP SERPL CALCULATED.3IONS-SCNC: 8 MMOL/L (ref 3–14)
AST SERPL W P-5'-P-CCNC: 296 U/L (ref 0–45)
BILIRUB SERPL-MCNC: 1 MG/DL (ref 0.2–1.3)
BUN SERPL-MCNC: 6 MG/DL (ref 7–30)
CALCIUM SERPL-MCNC: 8.5 MG/DL (ref 8.5–10.1)
CHLORIDE SERPL-SCNC: 95 MMOL/L (ref 94–109)
CO2 SERPL-SCNC: 31 MMOL/L (ref 20–32)
CREAT SERPL-MCNC: 0.63 MG/DL (ref 0.66–1.25)
ERYTHROCYTE [DISTWIDTH] IN BLOOD BY AUTOMATED COUNT: 12.4 % (ref 10–15)
GFR SERPL CREATININE-BSD FRML MDRD: >90 ML/MIN/{1.73_M2}
GLUCOSE SERPL-MCNC: 101 MG/DL (ref 70–99)
HCT VFR BLD AUTO: 38.5 % (ref 40–53)
HGB BLD-MCNC: 13.8 G/DL (ref 13.3–17.7)
LACTATE BLD-SCNC: 1.2 MMOL/L (ref 0.7–2)
MCH RBC QN AUTO: 29.6 PG (ref 26.5–33)
MCHC RBC AUTO-ENTMCNC: 35.8 G/DL (ref 31.5–36.5)
MCV RBC AUTO: 83 FL (ref 78–100)
PHOSPHATE SERPL-MCNC: 1.3 MG/DL (ref 2.5–4.5)
PHOSPHATE SERPL-MCNC: 1.5 MG/DL (ref 2.5–4.5)
PHOSPHATE SERPL-MCNC: 1.9 MG/DL (ref 2.5–4.5)
PLATELET # BLD AUTO: 61 10E9/L (ref 150–450)
POTASSIUM SERPL-SCNC: 3.2 MMOL/L (ref 3.4–5.3)
POTASSIUM SERPL-SCNC: 3.2 MMOL/L (ref 3.4–5.3)
POTASSIUM SERPL-SCNC: 4 MMOL/L (ref 3.4–5.3)
PREALB SERPL IA-MCNC: 18 MG/DL (ref 15–45)
PROT SERPL-MCNC: 6.4 G/DL (ref 6.8–8.8)
RBC # BLD AUTO: 4.66 10E12/L (ref 4.4–5.9)
SODIUM SERPL-SCNC: 134 MMOL/L (ref 133–144)
WBC # BLD AUTO: 3.6 10E9/L (ref 4–11)

## 2021-04-13 PROCEDURE — 120N000002 HC R&B MED SURG/OB UMMC

## 2021-04-13 PROCEDURE — 250N000013 HC RX MED GY IP 250 OP 250 PS 637: Performed by: INTERNAL MEDICINE

## 2021-04-13 PROCEDURE — 83605 ASSAY OF LACTIC ACID: CPT | Performed by: INTERNAL MEDICINE

## 2021-04-13 PROCEDURE — 84132 ASSAY OF SERUM POTASSIUM: CPT | Performed by: INTERNAL MEDICINE

## 2021-04-13 PROCEDURE — 258N000003 HC RX IP 258 OP 636: Performed by: STUDENT IN AN ORGANIZED HEALTH CARE EDUCATION/TRAINING PROGRAM

## 2021-04-13 PROCEDURE — 36415 COLL VENOUS BLD VENIPUNCTURE: CPT | Performed by: INTERNAL MEDICINE

## 2021-04-13 PROCEDURE — 36415 COLL VENOUS BLD VENIPUNCTURE: CPT | Performed by: STUDENT IN AN ORGANIZED HEALTH CARE EDUCATION/TRAINING PROGRAM

## 2021-04-13 PROCEDURE — 80053 COMPREHEN METABOLIC PANEL: CPT | Performed by: STUDENT IN AN ORGANIZED HEALTH CARE EDUCATION/TRAINING PROGRAM

## 2021-04-13 PROCEDURE — 99232 SBSQ HOSP IP/OBS MODERATE 35: CPT | Mod: GC | Performed by: INTERNAL MEDICINE

## 2021-04-13 PROCEDURE — 93010 ELECTROCARDIOGRAM REPORT: CPT | Performed by: INTERNAL MEDICINE

## 2021-04-13 PROCEDURE — 99207 PR CDG-EXAM COMPONENT: MEETS DETAILED - UP CODED: CPT | Performed by: INTERNAL MEDICINE

## 2021-04-13 PROCEDURE — 250N000013 HC RX MED GY IP 250 OP 250 PS 637: Performed by: STUDENT IN AN ORGANIZED HEALTH CARE EDUCATION/TRAINING PROGRAM

## 2021-04-13 PROCEDURE — 84100 ASSAY OF PHOSPHORUS: CPT | Performed by: INTERNAL MEDICINE

## 2021-04-13 PROCEDURE — 93005 ELECTROCARDIOGRAM TRACING: CPT

## 2021-04-13 PROCEDURE — 84100 ASSAY OF PHOSPHORUS: CPT | Performed by: STUDENT IN AN ORGANIZED HEALTH CARE EDUCATION/TRAINING PROGRAM

## 2021-04-13 PROCEDURE — 85027 COMPLETE CBC AUTOMATED: CPT | Performed by: STUDENT IN AN ORGANIZED HEALTH CARE EDUCATION/TRAINING PROGRAM

## 2021-04-13 PROCEDURE — 84134 ASSAY OF PREALBUMIN: CPT | Performed by: STUDENT IN AN ORGANIZED HEALTH CARE EDUCATION/TRAINING PROGRAM

## 2021-04-13 RX ORDER — TRAZODONE HYDROCHLORIDE 50 MG/1
50-100 TABLET, FILM COATED ORAL
Status: DISCONTINUED | OUTPATIENT
Start: 2021-04-13 | End: 2021-04-13

## 2021-04-13 RX ORDER — TRAZODONE HYDROCHLORIDE 50 MG/1
50-100 TABLET, FILM COATED ORAL
Status: COMPLETED | OUTPATIENT
Start: 2021-04-13 | End: 2021-04-13

## 2021-04-13 RX ORDER — POTASSIUM CHLORIDE 750 MG/1
40 TABLET, EXTENDED RELEASE ORAL ONCE
Status: COMPLETED | OUTPATIENT
Start: 2021-04-13 | End: 2021-04-13

## 2021-04-13 RX ADMIN — SODIUM CHLORIDE: 9 INJECTION, SOLUTION INTRAVENOUS at 15:21

## 2021-04-13 RX ADMIN — SODIUM CHLORIDE: 9 INJECTION, SOLUTION INTRAVENOUS at 05:08

## 2021-04-13 RX ADMIN — Medication 100 MG: at 07:52

## 2021-04-13 RX ADMIN — LORAZEPAM 2 MG: 1 TABLET ORAL at 08:01

## 2021-04-13 RX ADMIN — TRAZODONE HYDROCHLORIDE 100 MG: 50 TABLET ORAL at 23:35

## 2021-04-13 RX ADMIN — FOLIC ACID 1 MG: 1 TABLET ORAL at 07:52

## 2021-04-13 RX ADMIN — OLANZAPINE 10 MG: 10 TABLET, FILM COATED ORAL at 21:00

## 2021-04-13 RX ADMIN — SODIUM CHLORIDE: 9 INJECTION, SOLUTION INTRAVENOUS at 23:35

## 2021-04-13 RX ADMIN — MULTIPLE VITAMINS W/ MINERALS TAB 1 TABLET: TAB at 07:52

## 2021-04-13 RX ADMIN — POTASSIUM CHLORIDE 40 MEQ: 750 TABLET, EXTENDED RELEASE ORAL at 00:38

## 2021-04-13 RX ADMIN — POTASSIUM CHLORIDE 40 MEQ: 750 TABLET, EXTENDED RELEASE ORAL at 11:08

## 2021-04-13 RX ADMIN — POTASSIUM & SODIUM PHOSPHATES POWDER PACK 280-160-250 MG 2 PACKET: 280-160-250 PACK at 13:27

## 2021-04-13 RX ADMIN — POTASSIUM & SODIUM PHOSPHATES POWDER PACK 280-160-250 MG 2 PACKET: 280-160-250 PACK at 20:47

## 2021-04-13 RX ADMIN — Medication 2 MG: at 22:28

## 2021-04-13 RX ADMIN — OMEPRAZOLE 40 MG: 20 CAPSULE, DELAYED RELEASE ORAL at 07:52

## 2021-04-13 RX ADMIN — LORAZEPAM 2 MG: 1 TABLET ORAL at 03:56

## 2021-04-13 NOTE — PROVIDER NOTIFICATION
"Paged Adriana \"pt 802 (PF) triggered sepsis with  and earlier temp that came down to 98.8. LA= 1.2. Pt denies chills or fever. HR down to 80s-90s during rest. VS stable\"  "

## 2021-04-13 NOTE — PROGRESS NOTES
SW Progress Note    SW f/u with pt in his room this morning. Pt denied any SW needs and states he is unsure if he wants chem dep treatment. Pt states he's been to 3 OP programs in the past. Pt anticipates discharging home. CD resources given to pt yesterday. SW will continue to follow and assist as needed.    Cristy Raymond MSW, SW  [facility]

## 2021-04-13 NOTE — PROGRESS NOTES
[facility]    Medicine Progress Note - Hospitalist Service       Date of Admission:  4/11/2021  Assessment & Plan      30-year-old male with past medical history depression, esophageal ulcer, alcohol use disorder previously complicated by withdrawal seizures and DTs presented to ED for evaluation of alcohol intoxication. He reported multiple falls prior to coming to hospital.    Alcohol abuse  Alcohol dependence  Alcohol withdrawal  Continue CIWA/MSSA protocol  Hold benzos for oversedation or RR < 10/min.  Continue FA, Thiamine, Multivitamins  IV fluids.  Consult CD when medically stable    #Hyponatremia: Moderate likley due to alcohol use and dehydration  Na 123 on admission now 134.      #Hypokalemia; replacement protocol  #Metabolic alkalosis   Secondary to emesis associated with acute EtOH. Resolved       #Acute hepatitis:   Due to alcohol use  Trending down.    Tylenol and salicylate level negative    #QTc prolongation; QTc 515  On 4/12. Repeat ordered.   On zyprexa at home, which was resumed on admission.   Check QTC if prolonged will hold zyprexa  Continue tele     #Refeeding Risk  Habitus with some muscle wasting on upper and lower extremities, history of no eating for at least one week,   - Prealbumin ordered  - Daily Na/K/Phos/Mg     #Thrombocytopenia: Platelet count 61. Episode of epistaxis today in AM prior to rounds but no bleeding now  HGB stable as well  Monitor for now           Diet:   Adult diet  DVT Prophylaxis: Ambulate every shift  Bauman Catheter: not present  Code Status:   Full code until patient is sober and able to participate in code discussion             Disposition Plan     Expected discharge: TBD        The patient's care was discussed with the Patient, RN, Care coordinator daniela Wright MD  Hospitalist Service  [facility]  Contact information available via [facility]  Paging/Directory    ______________________________________________________________________    Interval History   Seen and examined tremors, no nausea or vomiting, no abdomen pain. No sweats or hallucinations    Data reviewed today: I reviewed all medications, new labs and imaging results over the last 24 hours. I personally reviewed no images or EKG's today.    Physical Exam   Vital Signs: Temp: 98.8  F (37.1  C) Temp src: Oral BP: (!) 130/94 Pulse: 100   Resp: 18 SpO2: 100 % O2 Device: Oxymask Oxygen Delivery: 1.5 LPM  Weight: 185 lbs 8 oz     AOX3  General Appearance: Eyes: Symmetric conjunctival injection without discharge. EOMI, PERRL  HEENT: Large bruise over R superior orbit, poor dentition, moist mucous membranes  Respiratory: CTAB, breathing comfortably on RA  Cardiovascular: Tachycardic with regular rhythm, no m/r/g. Peripheral pulses symmetric  GI: Abdomen soft, NT/ND, no gross organomegaly  Lymph/Hematologic: No significant cervical lymphadenopathy  Genitourinary: deferred  Skin: Scrape over L hand appears fresh  Musculoskeletal: Overall upper and lower extremity muscle mass somewhat diminished relative to habitus  Neurologic: Alert and oriented, speaking clearly, CN2-12 intact.  Psychiatric: Acutely intoxicated and emotionally labile throughout history and exam       Data   Recent Labs   Lab 04/13/21  1257 04/13/21  0740 04/12/21 2015 04/12/21  1356 04/12/21  0541 04/11/21  2345   WBC  --  3.6*  --   --  5.0 6.7   HGB  --  13.8  --   --  14.3 17.6   MCV  --  83  --   --  80 79   PLT  --  61*  --   --  75* 123*   INR  --   --   --   --   --  0.88   NA  --  134 129* 126* 123* 123*   POTASSIUM 3.2* 3.2* 3.2* 3.3* 2.8* 2.6*   CHLORIDE  --  95 86* 83* 76* 68*   CO2  --  31 35* 31 34* 36*   BUN  --  6* 8 11 12 14   CR  --  0.63* 0.65* 0.60* 0.54* 0.63*   ANIONGAP  --  8 8 12 13 19*   EMILEE  --  8.5 8.3* 8.2* 7.7* 8.9   GLC  --  101* 110* 113* 157* 170*   ALBUMIN  --  3.5  --   --  3.6 5.0   PROTTOTAL  --  6.4*   --   --  6.8 9.0*   BILITOTAL  --  1.0  --   --  0.7 1.1   ALKPHOS  --  112  --   --  112 154*   ALT  --  200*  --   --  251* 325*   AST  --  296*  --   --  394* 518*     No results found for this or any previous visit (from the past 24 hour(s)).  Medications     sodium chloride 125 mL/hr at 04/13/21 0508       folic acid  1 mg Oral Daily     multivitamin, therapeutic  1 tablet Oral Daily     nicotine  1 patch Transdermal Daily     nicotine   Transdermal Q8H     OLANZapine  10 mg Oral At Bedtime     omeprazole  40 mg Oral QAM AC     potassium & sodium phosphates  2 packet Oral or Feeding Tube TID     vitamin B1  100 mg Oral Daily

## 2021-04-13 NOTE — PLAN OF CARE
"  VS: Pt running tachy especially during activity HR can jump to 120s. Temp 99.2, jumping up again at end of shift. BP stable. RR normal- snoring during sleep   O2 O2 drops during sleep at times, snoring present, 1.5 L placed at 1430, O2 sat increased to 100% after awakening and O2 placement via oxymask   Output: Polyuria and Polydipsia- pt educated on not needing to drink too much water. IV NS running at 125    Last BM: Prior to admision   Activity: Not OOB during shift- sleeping most of day   Skin: Bruising to face and knees- pt states due to fall 4/13 prior to admission bed alarm on   Pain: Denies pain throughout shift   CMS: Intact, denies N/T, Aox4, pulses +2 throughout,    Dressing: none   Diet: Regular   LDA: Pt lost IV access due to adjusting in bed frequently, RN flor placed another IV in R forearm running  mL- CDI   Equipment: Tele, pulse ox, IV pole   Plan: Manage withdrawal, rest, protein encouragement, K+ and Phos mangement   Additional Info: Pt keeps asking for \"sleeping pill that starts with A\"- educated on the need to ativan due to withdrawal symptoms. Ativan 2 mg given for MSSA of 8 @ 0800- pt sleeping throguhout shift, denies hallucinations, mild tremor, at times clammy, not agitated.       "

## 2021-04-13 NOTE — PLAN OF CARE
"      VS: BP (!) 152/94 (BP Location: Left arm)   Pulse 106   Temp 99.3  F (37.4  C) (Oral)   Resp 16   Ht 1.816 m (5' 11.5\")   Wt 84.1 kg (185 lb 8 oz)   SpO2 100%   BMI 25.51 kg/m    Was on 1L oxy mask, removed half way through shift   Output: Voids in urinal w/o difficulty  No BM during shift   Activity: SBA w/ gait belt   Skin: WDL x briose on R eye, abrasion on L shoulder and bilateral knee   Pain:   Denied any pain   Neuro/CMS:   A&Ox4, CMS intact  MSSA 7, 14, given 2 mg ativan total   Diet:   regular   LDA:   R PIV came out while pt was sleeping, flyer inserted a new one on the R forearm, reinforced the dressing in the AM   Equipment:   IV pole, seizure pads   Plan:   Continue POC, monitor withdrawal symptoms monitor nose bleed   Additional Info:   0095: R nose bleed, denied dizziness or lightheadedness      "

## 2021-04-13 NOTE — PLAN OF CARE
"      VS: /82 (BP Location: Left arm)   Pulse 85   Temp 98.6  F (37  C) (Oral)   Resp 15   Ht 1.816 m (5' 11.5\")   Wt 84.1 kg (185 lb 8 oz)   SpO2 100%   BMI 25.51 kg/m     Output: 800cc output per urinal, bowel sounds+   Lungs: CTA- pt on 1 L overnight. O2 sats infrequently dipping to 86% while patient sleeping   Activity: Up in room with SBA; pt light headed upon standing up    Skin: WDL ex significant bruising to L eye, bruising and abrasions to bilateral knees and L shoulder from fall last night   Pain:   Headache and \"just uncomfortable, like stomach ache and hangover stuff\"   Neuro/CMS:   A & O x4, CMS intact  MSSA 9 & 7- 2 mg valium given   Dressing(s):   none   Diet:   Regular; ate 50% of supper this evening   LDA:   PIV R arm infusing NS @125mL/hr   Equipment:   IV pole, seizure pads   Plan:   Continue w/ POC: monitor symptoms of withdrawal and encouraging fluids   Additional Info:   RN managed potassium      "

## 2021-04-14 ENCOUNTER — TELEPHONE (OUTPATIENT)
Dept: BEHAVIORAL HEALTH | Facility: CLINIC | Age: 31
End: 2021-04-14

## 2021-04-14 LAB
ALBUMIN SERPL-MCNC: 3.3 G/DL (ref 3.4–5)
ALP SERPL-CCNC: 118 U/L (ref 40–150)
ALT SERPL W P-5'-P-CCNC: 201 U/L (ref 0–70)
ANION GAP SERPL CALCULATED.3IONS-SCNC: 11 MMOL/L (ref 3–14)
AST SERPL W P-5'-P-CCNC: 241 U/L (ref 0–45)
BILIRUB SERPL-MCNC: 0.8 MG/DL (ref 0.2–1.3)
BUN SERPL-MCNC: 4 MG/DL (ref 7–30)
CALCIUM SERPL-MCNC: 8.3 MG/DL (ref 8.5–10.1)
CHLORIDE SERPL-SCNC: 98 MMOL/L (ref 94–109)
CO2 SERPL-SCNC: 25 MMOL/L (ref 20–32)
CREAT SERPL-MCNC: 0.58 MG/DL (ref 0.66–1.25)
ERYTHROCYTE [DISTWIDTH] IN BLOOD BY AUTOMATED COUNT: 12.3 % (ref 10–15)
GFR SERPL CREATININE-BSD FRML MDRD: >90 ML/MIN/{1.73_M2}
GLUCOSE SERPL-MCNC: 164 MG/DL (ref 70–99)
HCT VFR BLD AUTO: 39.5 % (ref 40–53)
HGB BLD-MCNC: 13.6 G/DL (ref 13.3–17.7)
INTERPRETATION ECG - MUSE: NORMAL
MCH RBC QN AUTO: 29.2 PG (ref 26.5–33)
MCHC RBC AUTO-ENTMCNC: 34.4 G/DL (ref 31.5–36.5)
MCV RBC AUTO: 85 FL (ref 78–100)
PLATELET # BLD AUTO: 83 10E9/L (ref 150–450)
POTASSIUM SERPL-SCNC: 3.3 MMOL/L (ref 3.4–5.3)
PROT SERPL-MCNC: 6.5 G/DL (ref 6.8–8.8)
RBC # BLD AUTO: 4.65 10E12/L (ref 4.4–5.9)
SODIUM SERPL-SCNC: 134 MMOL/L (ref 133–144)
WBC # BLD AUTO: 4.9 10E9/L (ref 4–11)

## 2021-04-14 PROCEDURE — 999N000216 HC STATISTIC ADULT CD FACE TO FACE-NO CHRG

## 2021-04-14 PROCEDURE — 36415 COLL VENOUS BLD VENIPUNCTURE: CPT | Performed by: INTERNAL MEDICINE

## 2021-04-14 PROCEDURE — 120N000002 HC R&B MED SURG/OB UMMC

## 2021-04-14 PROCEDURE — 85027 COMPLETE CBC AUTOMATED: CPT | Performed by: INTERNAL MEDICINE

## 2021-04-14 PROCEDURE — 80053 COMPREHEN METABOLIC PANEL: CPT | Performed by: INTERNAL MEDICINE

## 2021-04-14 PROCEDURE — 250N000013 HC RX MED GY IP 250 OP 250 PS 637: Performed by: INTERNAL MEDICINE

## 2021-04-14 PROCEDURE — 250N000013 HC RX MED GY IP 250 OP 250 PS 637: Performed by: STUDENT IN AN ORGANIZED HEALTH CARE EDUCATION/TRAINING PROGRAM

## 2021-04-14 PROCEDURE — 99207 PR CDG-MDM COMPONENT: MEETS MODERATE - DOWN CODED: CPT | Performed by: INTERNAL MEDICINE

## 2021-04-14 PROCEDURE — 99232 SBSQ HOSP IP/OBS MODERATE 35: CPT | Performed by: INTERNAL MEDICINE

## 2021-04-14 RX ORDER — TRAZODONE HYDROCHLORIDE 50 MG/1
50-100 TABLET, FILM COATED ORAL
Status: DISCONTINUED | OUTPATIENT
Start: 2021-04-14 | End: 2021-04-15 | Stop reason: HOSPADM

## 2021-04-14 RX ADMIN — OMEPRAZOLE 40 MG: 20 CAPSULE, DELAYED RELEASE ORAL at 08:12

## 2021-04-14 RX ADMIN — HYDROXYZINE HYDROCHLORIDE 25 MG: 25 TABLET, FILM COATED ORAL at 08:15

## 2021-04-14 RX ADMIN — POTASSIUM & SODIUM PHOSPHATES POWDER PACK 280-160-250 MG 2 PACKET: 280-160-250 PACK at 08:13

## 2021-04-14 RX ADMIN — Medication 100 MG: at 08:12

## 2021-04-14 RX ADMIN — HYDROXYZINE HYDROCHLORIDE 25 MG: 25 TABLET, FILM COATED ORAL at 03:40

## 2021-04-14 RX ADMIN — FOLIC ACID 1 MG: 1 TABLET ORAL at 08:12

## 2021-04-14 RX ADMIN — MULTIPLE VITAMINS W/ MINERALS TAB 1 TABLET: TAB at 08:19

## 2021-04-14 RX ADMIN — NICOTINE 1 PATCH: 14 PATCH, EXTENDED RELEASE TRANSDERMAL at 08:13

## 2021-04-14 RX ADMIN — OLANZAPINE 10 MG: 10 TABLET, FILM COATED ORAL at 22:34

## 2021-04-14 NOTE — PROGRESS NOTES
Patient takes Trazodone at home PRN at bedtime  Order placed for 1 dose    Carrington Mcgregor MD  [facility]  Contact information available via [facility] Paging/Directory

## 2021-04-14 NOTE — PLAN OF CARE
"      VS:   BP (!) 144/89 (BP Location: Right arm)   Pulse 89   Temp 98.7  F (37.1  C) (Oral)   Resp 20   Ht 1.816 m (5' 11.5\")   Wt 84.1 kg (185 lb 8 oz)   SpO2 96%   BMI 25.51 kg/m   RA, denies SOB or CP, pulse ox on   Output:   Voiding well via urinal, no BM this shift, passing gas,    Activity:   Has been in bed all shift, trying to sleep but unable to do so, bed alarm on for safety, up with SBA   Skin: Bruising around eyes, maxim shoulders and knees,    Pain:   Denies    Neuro/CMS:   Denies numbness or tingling, A&Ox4, slightly anxious- atarax given, cooperative, MSSA score low, conversant and mood appropriate to situation, pt reported feeling shameful but hopefull as well, seizure precautions in place   Dressing(s):   NA   Diet:   Regular diet, denies nausea, swallowing without difficulty, no abdominal discomfort reported,    LDA:   PIV infusing NS @ 125   Equipment:   IV poles, seizure pads, urinal, call light    Plan:   Psych eval, possible treatment?    Additional Info:   Rounded per unit routine, able to make needs known, call light in reach length, will continue to monitor and assist.       " independent

## 2021-04-14 NOTE — PROGRESS NOTES
"Patient is A&O x4, VSS. Denied CP, pain, hallucination, lightheadedness, dizziness, numbness, tingling and SOB. Uses oxygen via oxymask while sleeping due to oxygen drops to 88% during sleep at time, on RA when awake (saturation were % while awake). MSSA were 6, 5 and 6 during the shift. Pt  was asking this writer to give him a medication to help him sleep, melatonin given but pt stated, \"melatonin doesn't help me, I take trazodone at home\" MD paged. Pt keeps asking this writer \"Give me the medication they gave me earlier started with A, it helped me to sleep.\" Education provided the need to take Ativan to help for alcohol withdrawal. NS infusing via right lower arm PIV line. Potassium lab recheck this evening came back WDL. Phosphorous lab recheck were 1.9, replaced as ordered, pt last dose will be tomorrow morning per protocol and will be recheck after six hours of pt last dose, report given for RN on duty. Pt is on regular diet, tolerated well, denied N/V. Self repositioned and turned in bed. SBA to the BR, voiding without difficulties, LBM 4/13 per pt report. Pt is on telemetry monitoring. Able to use call light appropriately and make needs known, will continue to monitor patient as POC      "

## 2021-04-14 NOTE — CONSULTS
"4/14/2021    CD consult completed.   I reviewed SW note from yesterday. I called him via his bedside phone to see if there was anything I could provide to him. Pt reported he wasn't interested in treatment \"have been there at least 6 times, I could probably provide the info\". Pt reports if he is interested he knows how to obtain services. He thanked me for calling.     ANTONELLA Coronado  [email or web address]  [phone]    "

## 2021-04-14 NOTE — PLAN OF CARE
Patient A&O x4 and able to make his needs known. Denied SOB/KOHLER and pain. Denied CP, lightheadedness, dizziness, numbness, tingling. Regular diet and Pt is eating, drinking well and voiding spontaneously without difficulties. IV fluids discontinued. Pt is anxious to discharge soon. Bruising on R eye,shoulders and knees. MSSA score low and didn't need meds. Call light with in reach and rounded frequently. Will continue with POC.

## 2021-04-14 NOTE — PROGRESS NOTES
[facility]    Medicine Progress Note - Hospitalist Service       Date of Admission:  4/11/2021  Assessment & Plan      30-year-old male with past medical history depression, esophageal ulcer, alcohol use disorder previously complicated by withdrawal seizures and DTs presented to ED for evaluation of alcohol intoxication. He reported multiple falls prior to coming to hospital.    Alcohol abuse  Alcohol dependence  Alcohol withdrawal  Continue CIWA/MSSA protocol. No valium in last 24 hours but clinically he was flushed and tremulous on my evaluation  Continue MSSA protocol  Hold benzos for oversedation or RR < 10/min.  Continue FA, Thiamine, Multivitamins  Good oral intake, discontinue IVF  Chemical dependency saw him today and is not recommending admission because he has not received valium since yesterday.     #Hyponatremia: Moderate likley due to alcohol use and dehydration  Resolved     #Hypokalemia; replacement protocol  #Metabolic alkalosis   Secondary to emesis associated with acute EtOH. Resolved       #Acute hepatitis:   Due to alcohol use  Trending down.    Tylenol and salicylate level negative    #QTc prolongation; QTc 515  On 4/12. Repeat ordered.   On zyprexa at home, which was resumed on admission.   Repeat EKG QTC normal     #Refeeding Risk  Habitus with some muscle wasting on upper and lower extremities, history of no eating for at least one week,   - Prealbumin ordered  - Daily Na/K/Phos/Mg     #Thrombocytopenia: Platelet count 84. No more episodes of bleeding.  Monitor for now           Diet:   Adult diet  DVT Prophylaxis: Ambulate every shift  Bauman Catheter: not present  Code Status:   Full code             Disposition Plan     Expected discharge: possible tomorrow If platelets continues to go up and he is clinically looking better. He does not         The patient's care was discussed with the Patient, RN, Care coordinator rounds    Vinnie Wright,  MD  Hospitalist Service  [facility]  Contact information available via [facility] Paging/Directory    ______________________________________________________________________    Interval History   Seen and examined. Nausea, mild tremors and anxiety. No hallucinations or seizures    Data reviewed today: I reviewed all medications, new labs and imaging results over the last 24 hours. I personally reviewed no images or EKG's today.    Physical Exam   Vital Signs: Temp: 98.5  F (36.9  C) Temp src: Oral BP: (!) 137/90 Pulse: 128   Resp: 20 SpO2: 93 % O2 Device: None (Room air) Oxygen Delivery: 1.5 LPM  Weight: 185 lbs 8 oz     AOX3  General Appearance: Eyes: Symmetric conjunctival injection without discharge. EOMI, PERRL  HEENT: Large bruise over R superior orbit, poor dentition, moist mucous membranes  Respiratory: CTAB, breathing comfortably on RA  Cardiovascular: Tachycardic with regular rhythm, no m/r/g. Peripheral pulses symmetric  GI: Abdomen soft, NT/ND, no gross organomegaly  Lymph/Hematologic: No significant cervical lymphadenopathy  Genitourinary: deferred  Skin: Scrape over L hand appears fresh  Musculoskeletal: Overall upper and lower extremity muscle mass somewhat diminished relative to habitus  Neurologic: Alert and oriented, speaking clearly, CN2-12 intact.  Psychiatric: Acutely intoxicated and emotionally labile throughout history and exam       Data   Recent Labs   Lab 04/14/21  0750 04/13/21  1531 04/13/21  1257 04/13/21  0740 04/12/21 2015 04/12/21  0541 04/12/21  0541 04/11/21  2345   WBC 4.9  --   --  3.6*  --   --  5.0 6.7   HGB 13.6  --   --  13.8  --   --  14.3 17.6   MCV 85  --   --  83  --   --  80 79   PLT 83*  --   --  61*  --   --  75* 123*   INR  --   --   --   --   --   --   --  0.88     --   --  134 129*   < > 123* 123*   POTASSIUM 3.3* 4.0 3.2* 3.2* 3.2*   < > 2.8* 2.6*   CHLORIDE 98  --   --  95 86*   < > 76* 68*   CO2 25  --   --  31 35*   <  > 34* 36*   BUN 4*  --   --  6* 8   < > 12 14   CR 0.58*  --   --  0.63* 0.65*   < > 0.54* 0.63*   ANIONGAP 11  --   --  8 8   < > 13 19*   EMILEE 8.3*  --   --  8.5 8.3*   < > 7.7* 8.9   *  --   --  101* 110*   < > 157* 170*   ALBUMIN 3.3*  --   --  3.5  --   --  3.6 5.0   PROTTOTAL 6.5*  --   --  6.4*  --   --  6.8 9.0*   BILITOTAL 0.8  --   --  1.0  --   --  0.7 1.1   ALKPHOS 118  --   --  112  --   --  112 154*   *  --   --  200*  --   --  251* 325*   *  --   --  296*  --   --  394* 518*    < > = values in this interval not displayed.     No results found for this or any previous visit (from the past 24 hour(s)).  Medications     sodium chloride 125 mL/hr at 04/14/21 0819       folic acid  1 mg Oral Daily     multivitamin, therapeutic  1 tablet Oral Daily     nicotine  1 patch Transdermal Daily     nicotine   Transdermal Q8H     OLANZapine  10 mg Oral At Bedtime     omeprazole  40 mg Oral QAM AC     vitamin B1  100 mg Oral Daily

## 2021-04-15 ENCOUNTER — APPOINTMENT (OUTPATIENT)
Dept: GENERAL RADIOLOGY | Facility: CLINIC | Age: 31
End: 2021-04-15
Attending: INTERNAL MEDICINE
Payer: COMMERCIAL

## 2021-04-15 ENCOUNTER — PATIENT OUTREACH (OUTPATIENT)
Dept: CARE COORDINATION | Facility: CLINIC | Age: 31
End: 2021-04-15

## 2021-04-15 VITALS
OXYGEN SATURATION: 98 % | BODY MASS INDEX: 25.12 KG/M2 | HEIGHT: 72 IN | SYSTOLIC BLOOD PRESSURE: 145 MMHG | WEIGHT: 185.5 LBS | HEART RATE: 109 BPM | DIASTOLIC BLOOD PRESSURE: 95 MMHG | TEMPERATURE: 98.8 F | RESPIRATION RATE: 16 BRPM

## 2021-04-15 LAB
ALBUMIN SERPL-MCNC: 3.9 G/DL (ref 3.4–5)
ALP SERPL-CCNC: 128 U/L (ref 40–150)
ALT SERPL W P-5'-P-CCNC: 215 U/L (ref 0–70)
ANION GAP SERPL CALCULATED.3IONS-SCNC: 9 MMOL/L (ref 3–14)
AST SERPL W P-5'-P-CCNC: 207 U/L (ref 0–45)
BILIRUB SERPL-MCNC: 0.9 MG/DL (ref 0.2–1.3)
BUN SERPL-MCNC: 5 MG/DL (ref 7–30)
CALCIUM SERPL-MCNC: 9.1 MG/DL (ref 8.5–10.1)
CHLORIDE SERPL-SCNC: 99 MMOL/L (ref 94–109)
CO2 SERPL-SCNC: 26 MMOL/L (ref 20–32)
CREAT SERPL-MCNC: 0.62 MG/DL (ref 0.66–1.25)
ERYTHROCYTE [DISTWIDTH] IN BLOOD BY AUTOMATED COUNT: 12.7 % (ref 10–15)
GFR SERPL CREATININE-BSD FRML MDRD: >90 ML/MIN/{1.73_M2}
GLUCOSE SERPL-MCNC: 108 MG/DL (ref 70–99)
HCT VFR BLD AUTO: 41.2 % (ref 40–53)
HGB BLD-MCNC: 14.4 G/DL (ref 13.3–17.7)
MAGNESIUM SERPL-MCNC: 1.2 MG/DL (ref 1.6–2.3)
MCH RBC QN AUTO: 29.3 PG (ref 26.5–33)
MCHC RBC AUTO-ENTMCNC: 35 G/DL (ref 31.5–36.5)
MCV RBC AUTO: 84 FL (ref 78–100)
PLATELET # BLD AUTO: 111 10E9/L (ref 150–450)
POTASSIUM SERPL-SCNC: 3 MMOL/L (ref 3.4–5.3)
PROT SERPL-MCNC: 7.8 G/DL (ref 6.8–8.8)
RBC # BLD AUTO: 4.92 10E12/L (ref 4.4–5.9)
SODIUM SERPL-SCNC: 134 MMOL/L (ref 133–144)
WBC # BLD AUTO: 7.1 10E9/L (ref 4–11)

## 2021-04-15 PROCEDURE — 250N000013 HC RX MED GY IP 250 OP 250 PS 637: Performed by: STUDENT IN AN ORGANIZED HEALTH CARE EDUCATION/TRAINING PROGRAM

## 2021-04-15 PROCEDURE — 99207 PR CDG-CODE CATEGORY CHANGED: CPT | Performed by: INTERNAL MEDICINE

## 2021-04-15 PROCEDURE — 99239 HOSP IP/OBS DSCHRG MGMT >30: CPT | Performed by: INTERNAL MEDICINE

## 2021-04-15 PROCEDURE — 73610 X-RAY EXAM OF ANKLE: CPT | Mod: 26 | Performed by: RADIOLOGY

## 2021-04-15 PROCEDURE — 250N000013 HC RX MED GY IP 250 OP 250 PS 637: Performed by: INTERNAL MEDICINE

## 2021-04-15 PROCEDURE — 36415 COLL VENOUS BLD VENIPUNCTURE: CPT | Performed by: INTERNAL MEDICINE

## 2021-04-15 PROCEDURE — 99253 IP/OBS CNSLTJ NEW/EST LOW 45: CPT | Performed by: NURSE PRACTITIONER

## 2021-04-15 PROCEDURE — 83735 ASSAY OF MAGNESIUM: CPT | Performed by: INTERNAL MEDICINE

## 2021-04-15 PROCEDURE — 85027 COMPLETE CBC AUTOMATED: CPT | Performed by: INTERNAL MEDICINE

## 2021-04-15 PROCEDURE — 73610 X-RAY EXAM OF ANKLE: CPT | Mod: RT

## 2021-04-15 PROCEDURE — 80053 COMPREHEN METABOLIC PANEL: CPT | Performed by: INTERNAL MEDICINE

## 2021-04-15 RX ORDER — POTASSIUM CHLORIDE 750 MG/1
40 TABLET, EXTENDED RELEASE ORAL ONCE
Status: COMPLETED | OUTPATIENT
Start: 2021-04-15 | End: 2021-04-15

## 2021-04-15 RX ORDER — FOLIC ACID 1 MG/1
1 TABLET ORAL DAILY
Qty: 30 TABLET | Refills: 0 | Status: SHIPPED | OUTPATIENT
Start: 2021-04-15 | End: 2021-05-06

## 2021-04-15 RX ORDER — NICOTINE 21 MG/24HR
1 PATCH, TRANSDERMAL 24 HOURS TRANSDERMAL DAILY
Qty: 30 PATCH | Refills: 0 | Status: ON HOLD | OUTPATIENT
Start: 2021-04-15 | End: 2021-07-01

## 2021-04-15 RX ORDER — POTASSIUM CHLORIDE 1500 MG/1
20 TABLET, EXTENDED RELEASE ORAL 2 TIMES DAILY
Qty: 4 TABLET | Refills: 0 | Status: SHIPPED | OUTPATIENT
Start: 2021-04-15 | End: 2021-05-06

## 2021-04-15 RX ORDER — MULTIVITAMIN,THERAPEUTIC
1 TABLET ORAL DAILY
Qty: 30 TABLET | Refills: 0 | Status: ON HOLD | OUTPATIENT
Start: 2021-04-15 | End: 2021-07-01

## 2021-04-15 RX ORDER — IBUPROFEN 400 MG/1
400 TABLET, FILM COATED ORAL EVERY 6 HOURS PRN
Status: DISCONTINUED | OUTPATIENT
Start: 2021-04-15 | End: 2021-04-15

## 2021-04-15 RX ORDER — IBUPROFEN 400 MG/1
400 TABLET, FILM COATED ORAL EVERY 6 HOURS PRN
Qty: 16 TABLET | Refills: 0 | Status: SHIPPED | OUTPATIENT
Start: 2021-04-15 | End: 2021-04-15

## 2021-04-15 RX ORDER — LANOLIN ALCOHOL/MO/W.PET/CERES
100 CREAM (GRAM) TOPICAL DAILY
Qty: 30 TABLET | Refills: 0 | Status: SHIPPED | OUTPATIENT
Start: 2021-04-15 | End: 2021-05-06

## 2021-04-15 RX ORDER — POTASSIUM CHLORIDE 750 MG/1
40 TABLET, EXTENDED RELEASE ORAL 2 TIMES DAILY
Qty: 8 TABLET | Refills: 0 | Status: SHIPPED | OUTPATIENT
Start: 2021-04-15 | End: 2021-04-15

## 2021-04-15 RX ORDER — ACETAMINOPHEN 325 MG/1
650 TABLET ORAL EVERY 8 HOURS PRN
Status: DISCONTINUED | OUTPATIENT
Start: 2021-04-15 | End: 2021-04-15 | Stop reason: HOSPADM

## 2021-04-15 RX ORDER — OMEPRAZOLE 40 MG/1
40 CAPSULE, DELAYED RELEASE ORAL
Qty: 15 CAPSULE | Refills: 0 | Status: SHIPPED | OUTPATIENT
Start: 2021-04-16 | End: 2021-05-06

## 2021-04-15 RX ADMIN — OMEPRAZOLE 40 MG: 20 CAPSULE, DELAYED RELEASE ORAL at 06:53

## 2021-04-15 RX ADMIN — FOLIC ACID 1 MG: 1 TABLET ORAL at 08:17

## 2021-04-15 RX ADMIN — MULTIPLE VITAMINS W/ MINERALS TAB 1 TABLET: TAB at 08:19

## 2021-04-15 RX ADMIN — POTASSIUM CHLORIDE 40 MEQ: 750 TABLET, EXTENDED RELEASE ORAL at 11:12

## 2021-04-15 RX ADMIN — HYDROXYZINE HYDROCHLORIDE 25 MG: 25 TABLET, FILM COATED ORAL at 08:18

## 2021-04-15 RX ADMIN — Medication 100 MG: at 08:18

## 2021-04-15 RX ADMIN — NICOTINE 1 PATCH: 14 PATCH, EXTENDED RELEASE TRANSDERMAL at 08:17

## 2021-04-15 RX ADMIN — ACETAMINOPHEN 650 MG: 325 TABLET, FILM COATED ORAL at 06:53
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
## 2021-04-15 NOTE — DISCHARGE SUMMARY
Pt. discharged at 1615 to home and left with personal belongings. Picked up by father. Pt. received complete discharge paperwork and  medications as filled by discharge pharmacy and one script of potassium. Pt. was given times of last dose for all discharge medications in writing on discharge medication sheets. Discharge teaching included too.  Pt. to follow up with with PCP. Pt. had no further questions at the time of discharge and no unmet needs were identified.

## 2021-04-15 NOTE — PROGRESS NOTES
[facility]    Medicine Progress Note - Hospitalist Service       Date of Admission:  4/11/2021  Assessment & Plan      30-year-old male with past medical history depression, esophageal ulcer, alcohol use disorder previously complicated by withdrawal seizures and DTs presented to ED for evaluation of alcohol intoxication. He reported multiple falls prior to coming to hospital.    Alcohol abuse  Alcohol dependence  Alcohol withdrawal  Continue CIWA/MSSA protocol. No valium in last 48 hours  Continue MSSA protocol  Hold benzos for oversedation or RR < 10/min.  Continue FA, Thiamine, Multivitamins  Good oral intake,   Chemical dependency saw  is not recommending admission because he has not received valium     Right ankle pain, ordered ibuprofen for him, no GIB for now. Not on scheduled or PRN will get a XR first. If need rx for gout we need start on Colchicine, I will reassess him    #Hyponatremia: Moderate likley due to alcohol use and dehydration  Resolved     #Hypokalemia; replacement protocol, ordered more today  #Metabolic alkalosis   Secondary to emesis associated with acute EtOH. Resolved       #Acute hepatitis:   Due to alcohol use  Trending down.    Tylenol and salicylate level negative    #QTc prolongation; QTc 515  On 4/12. Repeat ordered.   On zyprexa at home, which was resumed on admission.   Repeat EKG QTC normal     #Refeeding Risk  Habitus with some muscle wasting on upper and lower extremities, history of no eating for at least one week,   - Prealbumin ordered  - Daily Na/K/Phos/Mg     #Thrombocytopenia: Platelet count trending up, now 111  Monitor for now           Diet:   Adult diet  DVT Prophylaxis: Ambulate every shift  Bauman Catheter: not present  Code Status:   Full code             Disposition Plan     Expected discharge: TBD. Pending Psych consult      The patient's care was discussed with the Patient, RN, Care coordinator rounds    Vinnie Wright,  MD  Hospitalist Service  [facility]  Contact information available via [facility] Paging/Directory    ______________________________________________________________________    Interval History   Seen and examined. Reported pain right ankle, no trauma said he has h/o gout does not take any med. He was discharged on Ibuprofen but then nursing staff he was roaming in hallways yelling and having delusions. discharge cancelled.       Data reviewed today: I reviewed all medications, new labs and imaging results over the last 24 hours. I personally reviewed no images or EKG's today.    Physical Exam   Vital Signs: Temp: 98.8  F (37.1  C) Temp src: Oral BP: (!) 145/95 Pulse: 109   Resp: 16 SpO2: 98 % O2 Device: None (Room air)    Weight: 185 lbs 8 oz     AOX3  General Appearance: Eyes: Symmetric conjunctival injection without discharge. EOMI, PERRL  HEENT: Large bruise over R superior orbit, poor dentition, moist mucous membranes  Respiratory: CTAB, breathing comfortably on RA  Cardiovascular: Tachycardic with regular rhythm, no m/r/g. Peripheral pulses symmetric  GI: Abdomen soft, NT/ND, no gross organomegaly  Lymph/Hematologic: No significant cervical lymphadenopathy  Genitourinary: deferred  Skin: Scrape over L hand appears fresh  Musculoskeletal: painful right ankle. No swelling or bruising noted  Neurologic: Alert and oriented, speaking clearly, CN2-12 intact.  Psychiatric: Acutely intoxicated and emotionally labile throughout history and exam       Data   Recent Labs   Lab 04/15/21  0752 04/14/21  0750 04/13/21  1531 04/13/21  0740 04/13/21  0740 04/11/21  2345 04/11/21  2345   WBC 7.1 4.9  --   --  3.6*   < > 6.7   HGB 14.4 13.6  --   --  13.8   < > 17.6   MCV 84 85  --   --  83   < > 79   * 83*  --   --  61*   < > 123*   INR  --   --   --   --   --   --  0.88    134  --   --  134   < > 123*   POTASSIUM 3.0* 3.3* 4.0   < > 3.2*   < > 2.6*   CHLORIDE 99 98  --   --   95   < > 68*   CO2 26 25  --   --  31   < > 36*   BUN 5* 4*  --   --  6*   < > 14   CR 0.62* 0.58*  --   --  0.63*   < > 0.63*   ANIONGAP 9 11  --   --  8   < > 19*   EMILEE 9.1 8.3*  --   --  8.5   < > 8.9   * 164*  --   --  101*   < > 170*   ALBUMIN 3.9 3.3*  --   --  3.5   < > 5.0   PROTTOTAL 7.8 6.5*  --   --  6.4*   < > 9.0*   BILITOTAL 0.9 0.8  --   --  1.0   < > 1.1   ALKPHOS 128 118  --   --  112   < > 154*   * 201*  --   --  200*   < > 325*   * 241*  --   --  296*   < > 518*    < > = values in this interval not displayed.     No results found for this or any previous visit (from the past 24 hour(s)).  Medications       folic acid  1 mg Oral Daily     multivitamin, therapeutic  1 tablet Oral Daily     nicotine  1 patch Transdermal Daily     nicotine   Transdermal Q8H     OLANZapine  10 mg Oral At Bedtime     omeprazole  40 mg Oral QAM AC     vitamin B1  100 mg Oral Daily

## 2021-04-15 NOTE — PLAN OF CARE
VS: Vital signs:  Temp: 98.6  F (37  C) Temp src: Oral BP: (!) 131/101 Pulse: 91   Resp: 18 SpO2: 97 % O2 Device: None (Room air)   Elevated BP   O2: >90% on RA, lung sounds clear, denies SOB/chest pain   Output: Voiding spontaneously without difficulty   Last BM: LBM 4/14, bowel sounds active   Activity: Independent/SBA, repositioning independently in bed   Skin: Intact ex scattered bruising   Pain: Reported pain in R foot which pt attributed to family hx of gout, moonlighter ordered tylenol and was given x1, ice packs   CMS: A&O x4, denies numbness/tingling   Dressing: None   Diet: Regular   LDA: PIV R forearm SL   Equipment: IV pole   Plan: Possible discharge 4/15   Additional Info: MSSA scores below 8 x2 this shift

## 2021-04-15 NOTE — PLAN OF CARE
Patient A&O but seemed confused and delusional today and used in appropriate words. Medicine and psych notified if further assessments are needed. Denied SOB/KOHLER and pain. Denied CP, lightheadedness, dizziness, numbness, tingling. Regular diet and Pt is eating, drinking well and voiding spontaneously without difficulties. Bruising on R eye,shoulders and knees. MSSA score low and didn't need cover. Call light with in reach and rounded frequently. Will continue with POC.

## 2021-04-16 NOTE — PROGRESS NOTES
Attempted to contact the patient x2 for post-hospital call without answer. Will close encounter at this time.    Amirah Quiroz CMA, [facility]  Post Hospital Discharge Team

## 2021-04-22 ENCOUNTER — OFFICE VISIT (OUTPATIENT)
Dept: FAMILY MEDICINE | Facility: CLINIC | Age: 31
End: 2021-04-22
Payer: COMMERCIAL

## 2021-04-22 VITALS
WEIGHT: 202.9 LBS | HEART RATE: 108 BPM | SYSTOLIC BLOOD PRESSURE: 150 MMHG | RESPIRATION RATE: 16 BRPM | BODY MASS INDEX: 27.48 KG/M2 | DIASTOLIC BLOOD PRESSURE: 107 MMHG | HEIGHT: 72 IN | OXYGEN SATURATION: 99 % | TEMPERATURE: 98 F

## 2021-04-22 DIAGNOSIS — F41.9 ANXIETY AND DEPRESSION: ICD-10-CM

## 2021-04-22 DIAGNOSIS — M25.571 ACUTE RIGHT ANKLE PAIN: ICD-10-CM

## 2021-04-22 DIAGNOSIS — E87.6 HYPOKALEMIA: ICD-10-CM

## 2021-04-22 DIAGNOSIS — R03.0 ELEVATED BP WITHOUT DIAGNOSIS OF HYPERTENSION: ICD-10-CM

## 2021-04-22 DIAGNOSIS — F32.A ANXIETY AND DEPRESSION: ICD-10-CM

## 2021-04-22 DIAGNOSIS — F10.10 ALCOHOL ABUSE: Primary | ICD-10-CM

## 2021-04-22 DIAGNOSIS — Z71.6 ENCOUNTER FOR TOBACCO USE CESSATION COUNSELING: ICD-10-CM

## 2021-04-22 DIAGNOSIS — F39 MOOD DISORDER (H): ICD-10-CM

## 2021-04-22 DIAGNOSIS — E87.1 HYPONATREMIA: ICD-10-CM

## 2021-04-22 DIAGNOSIS — F10.10 ALCOHOL ABUSE: ICD-10-CM

## 2021-04-22 LAB
ALBUMIN SERPL-MCNC: 3.8 G/DL (ref 3.4–5)
ALP SERPL-CCNC: 73 U/L (ref 40–150)
ALT SERPL W P-5'-P-CCNC: 141 U/L (ref 0–70)
ANION GAP SERPL CALCULATED.3IONS-SCNC: 4 MMOL/L (ref 3–14)
AST SERPL W P-5'-P-CCNC: 67 U/L (ref 0–45)
BASOPHILS # BLD AUTO: 0.1 10E9/L (ref 0–0.2)
BASOPHILS NFR BLD AUTO: 0.9 %
BILIRUB SERPL-MCNC: 0.3 MG/DL (ref 0.2–1.3)
BUN SERPL-MCNC: 16 MG/DL (ref 7–30)
CALCIUM SERPL-MCNC: 9 MG/DL (ref 8.5–10.1)
CHLORIDE SERPL-SCNC: 105 MMOL/L (ref 94–109)
CO2 SERPL-SCNC: 30 MMOL/L (ref 20–32)
CREAT SERPL-MCNC: 0.73 MG/DL (ref 0.66–1.25)
DIFFERENTIAL METHOD BLD: ABNORMAL
EOSINOPHIL # BLD AUTO: 0 10E9/L (ref 0–0.7)
EOSINOPHIL NFR BLD AUTO: 0.5 %
ERYTHROCYTE [DISTWIDTH] IN BLOOD BY AUTOMATED COUNT: 14 % (ref 10–15)
GFR SERPL CREATININE-BSD FRML MDRD: >90 ML/MIN/{1.73_M2}
GLUCOSE SERPL-MCNC: 84 MG/DL (ref 70–99)
HCT VFR BLD AUTO: 37.5 % (ref 40–53)
HGB BLD-MCNC: 12.4 G/DL (ref 13.3–17.7)
IMM GRANULOCYTES # BLD: 0.1 10E9/L (ref 0–0.4)
IMM GRANULOCYTES NFR BLD: 1.7 %
LYMPHOCYTES # BLD AUTO: 1.6 10E9/L (ref 0.8–5.3)
LYMPHOCYTES NFR BLD AUTO: 20.6 %
MAGNESIUM SERPL-MCNC: 1.5 MG/DL (ref 1.6–2.3)
MCH RBC QN AUTO: 29.2 PG (ref 26.5–33)
MCHC RBC AUTO-ENTMCNC: 33.1 G/DL (ref 31.5–36.5)
MCV RBC AUTO: 88 FL (ref 78–100)
MONOCYTES # BLD AUTO: 0.8 10E9/L (ref 0–1.3)
MONOCYTES NFR BLD AUTO: 10.8 %
NEUTROPHILS # BLD AUTO: 5 10E9/L (ref 1.6–8.3)
NEUTROPHILS NFR BLD AUTO: 65.5 %
NRBC # BLD AUTO: 0 10*3/UL
NRBC BLD AUTO-RTO: 0 /100
PLATELET # BLD AUTO: 425 10E9/L (ref 150–450)
POTASSIUM SERPL-SCNC: 3.7 MMOL/L (ref 3.4–5.3)
PROT SERPL-MCNC: 7.4 G/DL (ref 6.8–8.8)
RBC # BLD AUTO: 4.24 10E12/L (ref 4.4–5.9)
SODIUM SERPL-SCNC: 140 MMOL/L (ref 133–144)
URATE SERPL-MCNC: 7 MG/DL (ref 3.5–7.2)
WBC # BLD AUTO: 7.7 10E9/L (ref 4–11)

## 2021-04-22 PROCEDURE — 36415 COLL VENOUS BLD VENIPUNCTURE: CPT | Performed by: PATHOLOGY

## 2021-04-22 PROCEDURE — 85025 COMPLETE CBC W/AUTO DIFF WBC: CPT | Performed by: PATHOLOGY

## 2021-04-22 PROCEDURE — 80053 COMPREHEN METABOLIC PANEL: CPT | Performed by: PATHOLOGY

## 2021-04-22 PROCEDURE — 83735 ASSAY OF MAGNESIUM: CPT | Performed by: PATHOLOGY

## 2021-04-22 PROCEDURE — 99204 OFFICE O/P NEW MOD 45 MIN: CPT | Performed by: NURSE PRACTITIONER

## 2021-04-22 PROCEDURE — 84550 ASSAY OF BLOOD/URIC ACID: CPT | Performed by: PATHOLOGY

## 2021-04-22 RX ORDER — OLANZAPINE 10 MG/1
10 TABLET ORAL AT BEDTIME
Qty: 30 TABLET | Refills: 0 | Status: ON HOLD | OUTPATIENT
Start: 2021-04-22 | End: 2021-07-01

## 2021-04-22 SDOH — HEALTH STABILITY: MENTAL HEALTH: HOW OFTEN DO YOU HAVE 6 OR MORE DRINKS ON ONE OCCASION?: NOT ASKED

## 2021-04-22 SDOH — HEALTH STABILITY: MENTAL HEALTH: HOW MANY STANDARD DRINKS CONTAINING ALCOHOL DO YOU HAVE ON A TYPICAL DAY?: NOT ASKED

## 2021-04-22 SDOH — HEALTH STABILITY: MENTAL HEALTH: HOW OFTEN DO YOU HAVE A DRINK CONTAINING ALCOHOL?: NOT ASKED

## 2021-04-22 ASSESSMENT — ANXIETY QUESTIONNAIRES
7. FEELING AFRAID AS IF SOMETHING AWFUL MIGHT HAPPEN: MORE THAN HALF THE DAYS
1. FEELING NERVOUS, ANXIOUS, OR ON EDGE: NEARLY EVERY DAY
5. BEING SO RESTLESS THAT IT IS HARD TO SIT STILL: NEARLY EVERY DAY
2. NOT BEING ABLE TO STOP OR CONTROL WORRYING: NEARLY EVERY DAY
3. WORRYING TOO MUCH ABOUT DIFFERENT THINGS: NEARLY EVERY DAY

## 2021-04-22 ASSESSMENT — PATIENT HEALTH QUESTIONNAIRE - PHQ9
5. POOR APPETITE OR OVEREATING: NEARLY EVERY DAY
SUM OF ALL RESPONSES TO PHQ QUESTIONS 1-9: 13

## 2021-04-22 ASSESSMENT — MIFFLIN-ST. JEOR: SCORE: 1904.38

## 2021-04-22 ASSESSMENT — PAIN SCALES - GENERAL: PAINLEVEL: NO PAIN (0)

## 2021-04-22 NOTE — PROGRESS NOTES
"Assessment & Plan     Alcohol abuse  - CBC with platelets differential  - Comprehensive metabolic panel    Acute right ankle pain  - CBC with platelets differential  - Uric acid    Hypokalemia  - Comprehensive metabolic panel    Hyponatremia  - Comprehensive metabolic panel    Encounter for tobacco use cessation counseling  - Continue cessation of cigarette smoking and chewing tobacco     Elevated BP, unsure if diagnosis of hypertension   - Comprehensive metabolic panel    Anxiety and depression    Mood disorder (H)  - OLANZapine (ZYPREXA) 10 MG tablet  Dispense: 30 tablet; Refill: 0        45 minutes spent on the date of the encounter doing chart review, history and exam, documentation and further activities per the note      Tobacco Cessation:   reports that he has been smoking. His smokeless tobacco use includes chew.  Tobacco Cessation Action Plan: Pharmacotherapies : Nicotine patch    BMI:   Estimated body mass index is 27.71 kg/m  as calculated from the following:    Height as of this encounter: 1.822 m (5' 11.75\").    Weight as of this encounter: 92 kg (202 lb 14.4 oz).       Depression Screening Follow Up    PHQ 4/22/2021   PHQ-9 Total Score 13   Q9: Thoughts of better off dead/self-harm past 2 weeks Not at all         See Patient Instructions  Continue current medication regimen.  Patient lost his olanzapine prescription - so a new prescription was sent for the patient.  Encouraged continual sobriety.  Patient does not want CD treatment.  He is agreeable to following-up with [facility] with a psychiatrists and therapists.  He states he is doing well.  Patient states he has a good support symptoms including family and friends. Discussed patients plan if he is triggered to use alcohol and patient stated he was not worried about this as never plans on drinking alcohol again.  Discussed follow-up in one to two weeks with this provider.  Patient is agreeable to follow-up in 2 weeks, sooner if " needed.  Return to clinic if no improvement or symptoms worsen.  Patient verbalized understanding & agreed with plan of care.      MATEO Burkett, CNP  [facility] MARLO Resendiz is a 30 year old who presents for the following health issues  accompanied by himself:    HPI   Review of Recent Hospitalization   Martín presents today for a hospital follow-up visit. He was admitted from the ED due to alcohol detoxification. He was noted to have electrolyte abnormalities and hypokalemia, QT prolongation, and acute alcoholic hepatitis.  PMH significant for alcohol use disorder and recent episode of psychosis.  He had an episodes in the hospital in which he thought someone was in room and was seen by inpatient psychiatry. Patient has struggled with anxiety and depression for the past 4 years.  He has been struggling with alcohol use since then. Per patient and the review of the chart he has had 6 inpatient treatment stays.  He has a medical cannabis card and uses marijuana daily. Per psychiatry he is to continue olanzapine 10mg at bedtime. He was on olanzapine before his admission.  Continue hydroxyzine 25mg TID PRN for anxiety and follow-up with psychiatry and therapy at [facility].  His DS-5 diagnosis per psychiatry where:  1 Alcohol use disorder, severe  #2 Alcohol withdrawal, resolved  #3 Unspecified mood disorder, r/o unspecified bipolar spectrum disorder  #4 Generalized Anxiety Disorder  #5 PTSD by history      PMH: depression, esophageal ulcer, alcohol use disorder previously complicated by withdrawal seizures an DTS.  He had fallen multiple times a time.      He states he is sexually active but declines STI testing today.          Current status   He is doing well.  He was discharge on Friday and spent one night with his parents. He then went home and his father picked-up for the weekend and they went up to the cabin on [address].  Before his  "hospitalization he was drinking 1 liter of vodka per day.  He has been sober since 4/11/2021. He remains sober and states this is going well.  He needs to follow-up with psychology and psychiatry outpatient. Declines suicidal thoughts or ideations.   He does not want outpatient treatment for chemical dependence but will follow-up with [facility].  He is agreeable to following-up with a psychiatrists and therapist.  Patient states he plans on never drinking again and declines any specific triggers for his alcohol use.  He states he is eating and drinking fluids without issues. He had multiple falls at home but declines any residual headaches or dizziness.  He declines chest pain or SOB.    PHQ 4/22/2021   PHQ-9 Total Score 13   Q9: Thoughts of better off dead/self-harm past 2 weeks Not at all         Right ankle pain   He continues with right ankle pain.  Patient had a right ankle xray in the hospital.  Declines redness or swelling.  He is worried he has gout.       Hypertension   BP noted to be elevated today.  Patient states he has had issues but is unsure if he was ever on BP medications.   No blood pressure medications noted in patient's chart.  Patient did state he went to the wrong building for his appointment today and was worried about being late.    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         Review of Systems   Constitutional, HEENT, cardiovascular, pulmonary, gi and gu systems are negative, except as otherwise noted.      Objective    BP (!) 150/107   Pulse 108   Temp 98  F (36.7  C) (Oral)   Resp 16   Ht 1.822 m (5' 11.75\")   Wt 92 kg (202 lb 14.4 oz)   SpO2 99%   BMI 27.71 kg/m    Body mass index is 27.71 kg/m .  Physical Exam   GENERAL: healthy, alert and no distress  EYES: Eyes grossly normal to inspection, PERRL and conjunctivae and sclerae normal  HENT: ear canals and TM's normal, nose and mouth without ulcers or lesions  NECK: no " adenopathy, no asymmetry, masses, or scars and thyroid normal to palpation  RESP: lungs clear to auscultation - no rales, rhonchi or wheezes  CV: regular rate and rhythm, normal S1 S2, no S3 or S4, no murmur, click or rub, no peripheral edema and peripheral pulses strong  MS: patient did not want to take off his work boots for examination of at least his right ankle   PSYCH: mentation appears normal, affect normal/bright    Admission on 04/11/2021, Discharged on 04/15/2021   Component Date Value Ref Range Status     Alcohol Breath Test 04/11/2021 0.338* 0.00 - 0.01 Final     WBC 04/11/2021 6.7  4.0 - 11.0 10e9/L Final     RBC Count 04/11/2021 6.03* 4.4 - 5.9 10e12/L Final     Hemoglobin 04/11/2021 17.6  13.3 - 17.7 g/dL Final     Hematocrit 04/11/2021 47.6  40.0 - 53.0 % Final     MCV 04/11/2021 79  78 - 100 fl Final     MCH 04/11/2021 29.2  26.5 - 33.0 pg Final     MCHC 04/11/2021 37.0* 31.5 - 36.5 g/dL Final     RDW 04/11/2021 12.4  10.0 - 15.0 % Final     Platelet Count 04/11/2021 123* 150 - 450 10e9/L Final     Diff Method 04/11/2021 Automated Method   Final     % Neutrophils 04/11/2021 82.0  % Final     % Lymphocytes 04/11/2021 10.3  % Final     % Monocytes 04/11/2021 7.2  % Final     % Eosinophils 04/11/2021 0.0  % Final     % Basophils 04/11/2021 0.1  % Final     % Immature Granulocytes 04/11/2021 0.4  % Final     Nucleated RBCs 04/11/2021 0  0 /100 Final     Absolute Neutrophil 04/11/2021 5.5  1.6 - 8.3 10e9/L Final     Absolute Lymphocytes 04/11/2021 0.7* 0.8 - 5.3 10e9/L Final     Absolute Monocytes 04/11/2021 0.5  0.0 - 1.3 10e9/L Final     Absolute Eosinophils 04/11/2021 0.0  0.0 - 0.7 10e9/L Final     Absolute Basophils 04/11/2021 0.0  0.0 - 0.2 10e9/L Final     Abs Immature Granulocytes 04/11/2021 0.0  0 - 0.4 10e9/L Final     Absolute Nucleated RBC 04/11/2021 0.0   Final     Sodium 04/11/2021 123* 133 - 144 mmol/L Final     Potassium 04/11/2021 2.6* 3.4 - 5.3 mmol/L Final    Comment: Critical Value  called to and read back by  KO LEUNG RN 4/12/21 0023 TT       Chloride 04/11/2021 68* 94 - 109 mmol/L Final     Carbon Dioxide 04/11/2021 36* 20 - 32 mmol/L Final     Anion Gap 04/11/2021 19* 3 - 14 mmol/L Final     Glucose 04/11/2021 170* 70 - 99 mg/dL Final     Urea Nitrogen 04/11/2021 14  7 - 30 mg/dL Final     Creatinine 04/11/2021 0.63* 0.66 - 1.25 mg/dL Final     GFR Estimate 04/11/2021 >90  >60 mL/min/[1.73_m2] Final    Comment: Non  GFR Calc  Starting 12/18/2018, serum creatinine based estimated GFR (eGFR) will be   calculated using the Chronic Kidney Disease Epidemiology Collaboration   (CKD-EPI) equation.       GFR Estimate If Black 04/11/2021 >90  >60 mL/min/[1.73_m2] Final    Comment:  GFR Calc  Starting 12/18/2018, serum creatinine based estimated GFR (eGFR) will be   calculated using the Chronic Kidney Disease Epidemiology Collaboration   (CKD-EPI) equation.       Calcium 04/11/2021 8.9  8.5 - 10.1 mg/dL Final     Bilirubin Total 04/11/2021 1.1  0.2 - 1.3 mg/dL Final     Albumin 04/11/2021 5.0  3.4 - 5.0 g/dL Final     Protein Total 04/11/2021 9.0* 6.8 - 8.8 g/dL Final     Alkaline Phosphatase 04/11/2021 154* 40 - 150 U/L Final     ALT 04/11/2021 325* 0 - 70 U/L Final     AST 04/11/2021 518* 0 - 45 U/L Final    Comment: Critical Value called to and read back by  KO LEUNG RN 4/12/21 0023 TT       Ethanol g/dL 04/11/2021 0.42* <0.01 g/dL Final    Comment: Critical Value called to and read back by  KO LEUNG RN 4/12/21 0023 TT       SARS-CoV-2 Virus Specimen Source 04/11/2021 Nasopharyngeal   Final     SARS-CoV-2 PCR Result 04/11/2021 NEGATIVE   Final    SARS-CoV2 (COVID-19) RNA not detected, presumed negative.     SARS-CoV-2 PCR Comment 04/11/2021 (Note)   Final    Comment: Testing was performed using the marta SARS-CoV-2 & Influenza A/B Assay on the   marta [employer] System.  This test should be ordered for the detection of SARS-COV-2 in individuals who   meet  SARS-CoV-2 clinical and/or epidemiological criteria. Test performance is   unknown in asymptomatic patients.  This test is for in vitro diagnostic use under the FDA EUA for laboratories   certified under CLIA to perform moderate and/or high complexity testing. This   test has not been FDA cleared or approved.  A negative test does not rule out the presence of PCR inhibitors in the   specimen or target RNA in concentration below the limit of detection for the   assay. The possibility of a false negative should be considered if the   patient's recent exposure or clinical presentation suggests COVID-19.  [facility] are certified under the Clinical Laboratory   Improvement Amendments of 1988 (CLIA-88) as qualified to perform moderate   and/or high complexity laboratory testing.       Magnesium 04/11/2021 1.9  1.6 - 2.3 mg/dL Final     Interpretation ECG 04/12/2021 Click View Image link to view waveform and result   Final     Sodium 04/12/2021 123* 133 - 144 mmol/L Final     Potassium 04/12/2021 2.8* 3.4 - 5.3 mmol/L Final     Chloride 04/12/2021 76* 94 - 109 mmol/L Final     Carbon Dioxide 04/12/2021 34* 20 - 32 mmol/L Final     Anion Gap 04/12/2021 13  3 - 14 mmol/L Final     Glucose 04/12/2021 157* 70 - 99 mg/dL Final     Urea Nitrogen 04/12/2021 12  7 - 30 mg/dL Final     Creatinine 04/12/2021 0.54* 0.66 - 1.25 mg/dL Final     GFR Estimate 04/12/2021 >90  >60 mL/min/[1.73_m2] Final    Comment: Non  GFR Calc  Starting 12/18/2018, serum creatinine based estimated GFR (eGFR) will be   calculated using the Chronic Kidney Disease Epidemiology Collaboration   (CKD-EPI) equation.       GFR Estimate If Black 04/12/2021 >90  >60 mL/min/[1.73_m2] Final    Comment:  GFR Calc  Starting 12/18/2018, serum creatinine based estimated GFR (eGFR) will be   calculated using the Chronic Kidney Disease Epidemiology Collaboration   (CKD-EPI) equation.       Calcium 04/12/2021 7.7* 8.5 -  10.1 mg/dL Final     Bilirubin Total 04/12/2021 0.7  0.2 - 1.3 mg/dL Final     Albumin 04/12/2021 3.6  3.4 - 5.0 g/dL Final     Protein Total 04/12/2021 6.8  6.8 - 8.8 g/dL Final     Alkaline Phosphatase 04/12/2021 112  40 - 150 U/L Final     ALT 04/12/2021 251* 0 - 70 U/L Final     AST 04/12/2021 394* 0 - 45 U/L Final     WBC 04/12/2021 5.0  4.0 - 11.0 10e9/L Final     RBC Count 04/12/2021 4.96  4.4 - 5.9 10e12/L Final     Hemoglobin 04/12/2021 14.3  13.3 - 17.7 g/dL Final     Hematocrit 04/12/2021 39.5* 40.0 - 53.0 % Final     MCV 04/12/2021 80  78 - 100 fl Final     MCH 04/12/2021 28.8  26.5 - 33.0 pg Final     MCHC 04/12/2021 36.2  31.5 - 36.5 g/dL Final     RDW 04/12/2021 12.3  10.0 - 15.0 % Final     Platelet Count 04/12/2021 75* 150 - 450 10e9/L Final     CK Total 04/12/2021 201  30 - 300 U/L Final     Acetaminophen Level 04/12/2021 <2  mg/L Final    Therapeutic range: 10-20 mg/L     Salicylate Level 04/12/2021 <2  mg/dL Final    Comment: Therapeutic:        <20  Anti inflammatory:  15-30       Phosphorus 04/12/2021 2.8  2.5 - 4.5 mg/dL Final     Magnesium 04/12/2021 2.1  1.6 - 2.3 mg/dL Final     INR 04/11/2021 0.88  0.86 - 1.14 Final     Sodium 04/12/2021 126* 133 - 144 mmol/L Final     Potassium 04/12/2021 3.3* 3.4 - 5.3 mmol/L Final     Chloride 04/12/2021 83* 94 - 109 mmol/L Final     Carbon Dioxide 04/12/2021 31  20 - 32 mmol/L Final     Anion Gap 04/12/2021 12  3 - 14 mmol/L Final     Glucose 04/12/2021 113* 70 - 99 mg/dL Final     Urea Nitrogen 04/12/2021 11  7 - 30 mg/dL Final     Creatinine 04/12/2021 0.60* 0.66 - 1.25 mg/dL Final     GFR Estimate 04/12/2021 >90  >60 mL/min/[1.73_m2] Final    Comment: Non  GFR Calc  Starting 12/18/2018, serum creatinine based estimated GFR (eGFR) will be   calculated using the Chronic Kidney Disease Epidemiology Collaboration   (CKD-EPI) equation.       GFR Estimate If Black 04/12/2021 >90  >60 mL/min/[1.73_m2] Final    Comment:  GFR  Calc  Starting 12/18/2018, serum creatinine based estimated GFR (eGFR) will be   calculated using the Chronic Kidney Disease Epidemiology Collaboration   (CKD-EPI) equation.       Calcium 04/12/2021 8.2* 8.5 - 10.1 mg/dL Final     Sodium 04/12/2021 129* 133 - 144 mmol/L Final     Potassium 04/12/2021 3.2* 3.4 - 5.3 mmol/L Final     Chloride 04/12/2021 86* 94 - 109 mmol/L Final     Carbon Dioxide 04/12/2021 35* 20 - 32 mmol/L Final     Anion Gap 04/12/2021 8  3 - 14 mmol/L Final     Glucose 04/12/2021 110* 70 - 99 mg/dL Final     Urea Nitrogen 04/12/2021 8  7 - 30 mg/dL Final     Creatinine 04/12/2021 0.65* 0.66 - 1.25 mg/dL Final     GFR Estimate 04/12/2021 >90  >60 mL/min/[1.73_m2] Final    Comment: Non  GFR Calc  Starting 12/18/2018, serum creatinine based estimated GFR (eGFR) will be   calculated using the Chronic Kidney Disease Epidemiology Collaboration   (CKD-EPI) equation.       GFR Estimate If Black 04/12/2021 >90  >60 mL/min/[1.73_m2] Final    Comment:  GFR Calc  Starting 12/18/2018, serum creatinine based estimated GFR (eGFR) will be   calculated using the Chronic Kidney Disease Epidemiology Collaboration   (CKD-EPI) equation.       Calcium 04/12/2021 8.3* 8.5 - 10.1 mg/dL Final     Prealbumin 04/13/2021 18  15 - 45 mg/dL Final     WBC 04/13/2021 3.6* 4.0 - 11.0 10e9/L Final     RBC Count 04/13/2021 4.66  4.4 - 5.9 10e12/L Final     Hemoglobin 04/13/2021 13.8  13.3 - 17.7 g/dL Final     Hematocrit 04/13/2021 38.5* 40.0 - 53.0 % Final     MCV 04/13/2021 83  78 - 100 fl Final     MCH 04/13/2021 29.6  26.5 - 33.0 pg Final     MCHC 04/13/2021 35.8  31.5 - 36.5 g/dL Final     RDW 04/13/2021 12.4  10.0 - 15.0 % Final     Platelet Count 04/13/2021 61* 150 - 450 10e9/L Final     Sodium 04/13/2021 134  133 - 144 mmol/L Final     Potassium 04/13/2021 3.2* 3.4 - 5.3 mmol/L Final     Chloride 04/13/2021 95  94 - 109 mmol/L Final     Carbon Dioxide 04/13/2021 31  20 - 32 mmol/L Final      Anion Gap 04/13/2021 8  3 - 14 mmol/L Final     Glucose 04/13/2021 101* 70 - 99 mg/dL Final     Urea Nitrogen 04/13/2021 6* 7 - 30 mg/dL Final     Creatinine 04/13/2021 0.63* 0.66 - 1.25 mg/dL Final     GFR Estimate 04/13/2021 >90  >60 mL/min/[1.73_m2] Final    Comment: Non  GFR Calc  Starting 12/18/2018, serum creatinine based estimated GFR (eGFR) will be   calculated using the Chronic Kidney Disease Epidemiology Collaboration   (CKD-EPI) equation.       GFR Estimate If Black 04/13/2021 >90  >60 mL/min/[1.73_m2] Final    Comment:  GFR Calc  Starting 12/18/2018, serum creatinine based estimated GFR (eGFR) will be   calculated using the Chronic Kidney Disease Epidemiology Collaboration   (CKD-EPI) equation.       Calcium 04/13/2021 8.5  8.5 - 10.1 mg/dL Final     Bilirubin Total 04/13/2021 1.0  0.2 - 1.3 mg/dL Final     Albumin 04/13/2021 3.5  3.4 - 5.0 g/dL Final     Protein Total 04/13/2021 6.4* 6.8 - 8.8 g/dL Final     Alkaline Phosphatase 04/13/2021 112  40 - 150 U/L Final     ALT 04/13/2021 200* 0 - 70 U/L Final     AST 04/13/2021 296* 0 - 45 U/L Final     Phosphorus 04/13/2021 1.3* 2.5 - 4.5 mg/dL Final     Phosphorus 04/13/2021 1.5* 2.5 - 4.5 mg/dL Final     Potassium 04/13/2021 3.2* 3.4 - 5.3 mmol/L Final     Lactate for Sepsis Protocol 04/13/2021 1.2  0.7 - 2.0 mmol/L Final     Potassium 04/13/2021 4.0  3.4 - 5.3 mmol/L Final     Phosphorus 04/13/2021 1.9* 2.5 - 4.5 mg/dL Final     Interpretation ECG 04/13/2021 Click View Image link to view waveform and result   Final     WBC 04/14/2021 4.9  4.0 - 11.0 10e9/L Final     RBC Count 04/14/2021 4.65  4.4 - 5.9 10e12/L Final     Hemoglobin 04/14/2021 13.6  13.3 - 17.7 g/dL Final     Hematocrit 04/14/2021 39.5* 40.0 - 53.0 % Final     MCV 04/14/2021 85  78 - 100 fl Final     MCH 04/14/2021 29.2  26.5 - 33.0 pg Final     MCHC 04/14/2021 34.4  31.5 - 36.5 g/dL Final     RDW 04/14/2021 12.3  10.0 - 15.0 % Final     Platelet Count  04/14/2021 83* 150 - 450 10e9/L Final     Sodium 04/14/2021 134  133 - 144 mmol/L Final     Potassium 04/14/2021 3.3* 3.4 - 5.3 mmol/L Final     Chloride 04/14/2021 98  94 - 109 mmol/L Final     Carbon Dioxide 04/14/2021 25  20 - 32 mmol/L Final     Anion Gap 04/14/2021 11  3 - 14 mmol/L Final     Glucose 04/14/2021 164* 70 - 99 mg/dL Final     Urea Nitrogen 04/14/2021 4* 7 - 30 mg/dL Final     Creatinine 04/14/2021 0.58* 0.66 - 1.25 mg/dL Final     GFR Estimate 04/14/2021 >90  >60 mL/min/[1.73_m2] Final    Comment: Non  GFR Calc  Starting 12/18/2018, serum creatinine based estimated GFR (eGFR) will be   calculated using the Chronic Kidney Disease Epidemiology Collaboration   (CKD-EPI) equation.       GFR Estimate If Black 04/14/2021 >90  >60 mL/min/[1.73_m2] Final    Comment:  GFR Calc  Starting 12/18/2018, serum creatinine based estimated GFR (eGFR) will be   calculated using the Chronic Kidney Disease Epidemiology Collaboration   (CKD-EPI) equation.       Calcium 04/14/2021 8.3* 8.5 - 10.1 mg/dL Final     Bilirubin Total 04/14/2021 0.8  0.2 - 1.3 mg/dL Final     Albumin 04/14/2021 3.3* 3.4 - 5.0 g/dL Final     Protein Total 04/14/2021 6.5* 6.8 - 8.8 g/dL Final     Alkaline Phosphatase 04/14/2021 118  40 - 150 U/L Final     ALT 04/14/2021 201* 0 - 70 U/L Final     AST 04/14/2021 241* 0 - 45 U/L Final     Magnesium 04/15/2021 1.2* 1.6 - 2.3 mg/dL Final     WBC 04/15/2021 7.1  4.0 - 11.0 10e9/L Final     RBC Count 04/15/2021 4.92  4.4 - 5.9 10e12/L Final     Hemoglobin 04/15/2021 14.4  13.3 - 17.7 g/dL Final     Hematocrit 04/15/2021 41.2  40.0 - 53.0 % Final     MCV 04/15/2021 84  78 - 100 fl Final     MCH 04/15/2021 29.3  26.5 - 33.0 pg Final     MCHC 04/15/2021 35.0  31.5 - 36.5 g/dL Final     RDW 04/15/2021 12.7  10.0 - 15.0 % Final     Platelet Count 04/15/2021 111* 150 - 450 10e9/L Final     Sodium 04/15/2021 134  133 - 144 mmol/L Final     Potassium 04/15/2021 3.0* 3.4 - 5.3  mmol/L Final     Chloride 04/15/2021 99  94 - 109 mmol/L Final     Carbon Dioxide 04/15/2021 26  20 - 32 mmol/L Final     Anion Gap 04/15/2021 9  3 - 14 mmol/L Final     Glucose 04/15/2021 108* 70 - 99 mg/dL Final     Urea Nitrogen 04/15/2021 5* 7 - 30 mg/dL Final     Creatinine 04/15/2021 0.62* 0.66 - 1.25 mg/dL Final     GFR Estimate 04/15/2021 >90  >60 mL/min/[1.73_m2] Final    Comment: Non  GFR Calc  Starting 12/18/2018, serum creatinine based estimated GFR (eGFR) will be   calculated using the Chronic Kidney Disease Epidemiology Collaboration   (CKD-EPI) equation.       GFR Estimate If Black 04/15/2021 >90  >60 mL/min/[1.73_m2] Final    Comment:  GFR Calc  Starting 12/18/2018, serum creatinine based estimated GFR (eGFR) will be   calculated using the Chronic Kidney Disease Epidemiology Collaboration   (CKD-EPI) equation.       Calcium 04/15/2021 9.1  8.5 - 10.1 mg/dL Final     Bilirubin Total 04/15/2021 0.9  0.2 - 1.3 mg/dL Final     Albumin 04/15/2021 3.9  3.4 - 5.0 g/dL Final     Protein Total 04/15/2021 7.8  6.8 - 8.8 g/dL Final     Alkaline Phosphatase 04/15/2021 128  40 - 150 U/L Final     ALT 04/15/2021 215* 0 - 70 U/L Final     AST 04/15/2021 207* 0 - 45 U/L Final

## 2021-04-22 NOTE — NURSING NOTE
"30 year old  Chief Complaint   Patient presents with     Hospital F/U     Patient comes in for a hospital follow up.       Blood pressure (!) 150/107, pulse 108, temperature 98  F (36.7  C), temperature source Oral, resp. rate 16, height 1.822 m (5' 11.75\"), weight 92 kg (202 lb 14.4 oz), SpO2 99 %. Body mass index is 27.71 kg/m .  BP completed using cuff size:    Patient Active Problem List   Diagnosis     Hypokalemia     Hyponatremia       Wt Readings from Last 2 Encounters:   04/22/21 92 kg (202 lb 14.4 oz)   04/12/21 84.1 kg (185 lb 8 oz)     BP Readings from Last 3 Encounters:   04/22/21 (!) 150/107   04/15/21 (!) 145/95   11/04/20 (!) 134/92       No Known Allergies    Current Outpatient Medications   Medication     ferrous sulfate (FE TABS) 325 (65 Fe) MG EC tablet     folic acid (FOLVITE) 1 MG tablet     hydrOXYzine (ATARAX) 25 MG tablet     multivitamin w/minerals (THERA-VIT-M) tablet     multivitamin, therapeutic (THERA-VIT) TABS tablet     nicotine (NICODERM CQ) 14 MG/24HR 24 hr patch     OLANZapine (ZYPREXA) 10 MG tablet     omeprazole (PRILOSEC) 40 MG DR capsule     potassium chloride ER (KLOR-CON M) 20 MEQ CR tablet     thiamine (B-1) 100 MG tablet     traZODone (DESYREL) 50 MG tablet     No current facility-administered medications for this visit.        Social History     Tobacco Use     Smoking status: Light Tobacco Smoker     Smokeless tobacco: Current User     Types: Chew   Substance Use Topics     Alcohol use: Yes     Comment: binge, relapsed a few months ago     Drug use: Yes     Types: Marijuana     Comment: medical cannibus       Honoring Choices - Health Care Directive Guide offered to patient at time of visit.    Health Maintenance Due   Topic Date Due     PREVENTIVE CARE VISIT  Never done     ADVANCE CARE PLANNING  Never done     Pneumococcal Vaccine: Pediatrics (0 to 5 Years) and At-Risk Patients (6 to 64 Years) (1 of 2 - PPSV23) Never done     HIV SCREENING  Never done     HEPATITIS C " SCREENING  Never done     DTAP/TDAP/TD IMMUNIZATION (1 - Tdap) Never done     INFLUENZA VACCINE (1) Never done     PHQ-2  Never done         There is no immunization history on file for this patient.    No results found for: PAP      Recent Labs   Lab Test 04/15/21  0752 04/14/21  0750 04/13/21  0740 04/13/21  0740   * 201*  --  200*   CR 0.62* 0.58*  --  0.63*   GFRESTIMATED >90 >90  --  >90   GFRESTBLACK >90 >90  --  >90   ALBUMIN 3.9 3.3*  --  3.5   POTASSIUM 3.0* 3.3*   < > 3.2*    < > = values in this interval not displayed.       No flowsheet data found.    No flowsheet data found.    No flowsheet data found.    No flowsheet data found.    Dave Latif, EMT  April 22, 2021 2:22 PM

## 2021-04-27 NOTE — DISCHARGE SUMMARY
[facility]  Hospitalist Discharge Summary      Date of Admission:  4/11/2021  Date of Discharge:  4/15/2021  4:15 PM  Discharging Provider: Vinnie Wright MD      Discharge Diagnoses   Alcoholabuse    Follow-ups Needed After Discharge   Follow-up Appointments     Adult [facility] Follow-up and recommended labs and tests      Follow up with primary care provider, Physician No Ref-Primary, within 7   days for hospital follow- up.  The following labs/tests are recommended:   CBC and CMP in 3-5 days.    Follow up with outpatient chemical dependecy clinic if interested in rehab        Appointments on [place] and/or [facility] (with [facility] or [facility]   provider or service). Call [phone] if you haven't heard regarding   these appointments within 7 days of discharge.         Follow Up and recommended labs and tests      Follow up with primary care provider, Physician No Ref-Primary, within 7   days for hospital follow- up.  The following labs/tests are recommended:   CBC and BMP in 2-3 days.             Unresulted Labs Ordered in the Past 30 Days of this Admission     No orders found from 3/12/2021 to 4/12/2021.      These results will be followed up by     Discharge Disposition     Home     Hospital Course       30-year-old male with past medical history depression, esophageal ulcer, alcohol use disorder previously complicated by withdrawal seizures and DTs presented to ED for evaluation of alcohol intoxication. He reported multiple falls prior to coming to hospital.     Alcohol abuse  Alcohol dependence  Alcohol withdrawal  Continue CIWA/MSSA protocol. No valium in last 48 hours  Continue MSSA protocol  Hold benzos for oversedation or RR < 10/min.  Continue FA, Thiamine, Multivitamins  Good oral intake,   Chemical dependency saw  is not recommending admission because he has not received valium      Right ankle pain, ordered ibuprofen for him, no GIB for now. Not on scheduled  or PRN will get a XR first. If need rx for gout we need start on Colchicine, I will reassess him     #Hyponatremia: Moderate likley due to alcohol use and dehydration  Resolved     #Hypokalemia; replacement protocol,   #Metabolic alkalosis   Secondary to emesis associated with acute EtOH. Resolved        #Acute hepatitis:   Due to alcohol use  Trending down.    Tylenol and salicylate level negative     #QTc prolongation; QTc 515  On 4/12. Repeat ordered.   On zyprexa at home, which was resumed on admission.   Repeat EKG QTC normal     #Refeeding Risk  Habitus with some muscle wasting on upper and lower extremities, history of no eating for at least one week,   - Prealbumin ordered  - Daily Na/K/Phos/Mg     #Thrombocytopenia: Platelet count trending up, now 111  Monitor for now           Diet:   Adult diet  DVT Prophylaxis: Ambulate every shift  Bauman Catheter: not present    Consultations This Hospital Stay   SOCIAL WORK IP CONSULT  CHEMICAL DEPENDENCY IP CONSULT  MEDICATION HISTORY IP PHARMACY CONSULT  PSYCHIATRY IP CONSULT    Code Status   Prior    Time Spent on this Encounter   I, Vinnie Wright MD, personally saw the patient today and spent greater than 30 minutes discharging this patient.       Vinnie Wright MD  [facility] [address]  [address] [place] [id number]  Phone: [phone]  Fax: [phone]  ______________________________________________________________________    Physical Exam   Vital Signs:                   Weight: 185 lbs 8 oz      AOX3  General Appearance: Eyes: Symmetric conjunctival injection without discharge. EOMI, PERRL  HEENT: Large bruise over R superior orbit, poor dentition, moist mucous membranes  Respiratory: CTAB, breathing comfortably on RA  Cardiovascular: Tachycardic with regular rhythm, no m/r/g. Peripheral pulses symmetric  GI: Abdomen soft, NT/ND, no gross organomegaly  Lymph/Hematologic: No significant cervical lymphadenopathy  Genitourinary: deferred  Skin: Scrape over  L hand appears fresh  Musculoskeletal: painful right ankle. No swelling or bruising noted  Neurologic: Alert and oriented, speaking clearly, CN2-12 intact.  Psychiatric: Acutely intoxicated and emotionally labile throughout history and exam  Primary Care Physician   Physician No Ref-Primary    Discharge Orders      Reason for your hospital stay    Alcohol Withdrawal     Adult [facility] Follow-up and recommended labs and tests    Follow up with primary care provider, Physician No Ref-Primary, within 7 days for hospital follow- up.  The following labs/tests are recommended: CBC and CMP in 3-5 days.    Follow up with outpatient chemical dependecy clinic if interested in rehab      Appointments on [place] and/or [facility] (with [facility] or [facility] provider or service). Call [phone] if you haven't heard regarding these appointments within 7 days of discharge.     Activity    Your activity upon discharge: activity as tolerated     Follow Up and recommended labs and tests    Follow up with primary care provider, Physician No Ref-Primary, within 7 days for hospital follow- up.  The following labs/tests are recommended: CBC and BMP in 2-3 days.     Diet    Follow this diet upon discharge: Orders Placed This Encounter      Regular Diet Adult       Significant Results and Procedures   Most Recent 3 CBC's:  Recent Labs   Lab Test 04/22/21  1503 04/15/21  0752 04/14/21  0750   WBC 7.7 7.1 4.9   HGB 12.4* 14.4 13.6   MCV 88 84 85    111* 83*     Most Recent 3 BMP's:  Recent Labs   Lab Test 04/22/21  1503 04/15/21  0752 04/14/21  0750    134 134   POTASSIUM 3.7 3.0* 3.3*   CHLORIDE 105 99 98   CO2 30 26 25   BUN 16 5* 4*   CR 0.73 0.62* 0.58*   ANIONGAP 4 9 11   EMILEE 9.0 9.1 8.3*   GLC 84 108* 164*     Most Recent 2 LFT's:  Recent Labs   Lab Test 04/22/21  1503 04/15/21  0752   AST 67* 207*   * 215*   ALKPHOS 73 128   BILITOTAL 0.3 0.9     Most Recent 3 INR's:  Recent Labs   Lab Test 04/11/21  2345   INR  0.88   ,   Results for orders placed or performed during the hospital encounter of 04/11/21   CT Head w/o Contrast    Narrative    EXAM: CT HEAD W/O CONTRAST, CT CERVICAL SPINE W/O CONTRAST  LOCATION: [facility]  DATE/TIME: 4/11/2021 11:52 PM    INDICATION: Head and neck injury  COMPARISON: None.  TECHNIQUE:   1) Routine CT Head without IV contrast. Multiplanar reformats. Dose reduction techniques were used.  2) Routine CT Cervical Spine without IV contrast. Multiplanar reformats. Dose reduction techniques were used.    FINDINGS:   HEAD CT:   INTRACRANIAL CONTENTS: No intracranial hemorrhage, extraaxial collection, or mass effect.  No CT evidence of acute infarct. Normal parenchymal attenuation. Normal ventricles and sulci.     VISUALIZED ORBITS/SINUSES/MASTOIDS: No intraorbital abnormality. No paranasal sinus mucosal disease. No middle ear or mastoid effusion.    BONES/SOFT TISSUES: Small amount of soft tissue swelling overlying lateral aspect of right orbit.    CERVICAL SPINE CT:   VERTEBRA: Normal vertebral body heights. Straightening of cervical lordosis. Slight left cervical curve. No fracture or posttraumatic subluxation.     CANAL/FORAMINA: No canal or neural foraminal stenosis.    PARASPINAL: No extraspinal abnormality. Visualized lung fields are clear.      Impression    IMPRESSION:  HEAD CT:  1.  Normal head CT.    CERVICAL SPINE CT:  1.  No CT evidence for acute fracture or post traumatic subluxation.   Cervical spine CT w/o contrast    Narrative    EXAM: CT HEAD W/O CONTRAST, CT CERVICAL SPINE W/O CONTRAST  LOCATION: [facility]  DATE/TIME: 4/11/2021 11:52 PM    INDICATION: Head and neck injury  COMPARISON: None.  TECHNIQUE:   1) Routine CT Head without IV contrast. Multiplanar reformats. Dose reduction techniques were used.  2) Routine CT Cervical Spine without IV contrast. Multiplanar reformats. Dose reduction techniques were used.    FINDINGS:   HEAD CT:   INTRACRANIAL  CONTENTS: No intracranial hemorrhage, extraaxial collection, or mass effect.  No CT evidence of acute infarct. Normal parenchymal attenuation. Normal ventricles and sulci.     VISUALIZED ORBITS/SINUSES/MASTOIDS: No intraorbital abnormality. No paranasal sinus mucosal disease. No middle ear or mastoid effusion.    BONES/SOFT TISSUES: Small amount of soft tissue swelling overlying lateral aspect of right orbit.    CERVICAL SPINE CT:   VERTEBRA: Normal vertebral body heights. Straightening of cervical lordosis. Slight left cervical curve. No fracture or posttraumatic subluxation.     CANAL/FORAMINA: No canal or neural foraminal stenosis.    PARASPINAL: No extraspinal abnormality. Visualized lung fields are clear.      Impression    IMPRESSION:  HEAD CT:  1.  Normal head CT.    CERVICAL SPINE CT:  1.  No CT evidence for acute fracture or post traumatic subluxation.   XR Ankle Right G/E 3 Views    Narrative    Exam: XR ANKLE RT G/E 3 VW, 4/15/2021 1:17 PM    Indication: pain    Comparison: None    Findings:   3 nonweightbearing views of the right ankle.    The ankle syndesmosis are congruent. The talar dome is intact. No  fracture. Small calcaneal plantar spur. There is an os trigonum. No  significant soft tissue swelling.      Impression    Impression: No acute osseous abnormality.    I have personally reviewed the examination and initial interpretation  and I agree with the findings.    JAGRUTI IRVIN MD (Joe)       Discharge Medications   Discharge Medication List as of 4/15/2021  3:56 PM      START taking these medications    Details   folic acid (FOLVITE) 1 MG tablet Take 1 tablet (1 mg) by mouth daily, Disp-30 tablet, R-0, Local Print      multivitamin, therapeutic (THERA-VIT) TABS tablet Take 1 tablet by mouth daily, Disp-30 tablet, R-0, Local Print      nicotine (NICODERM CQ) 14 MG/24HR 24 hr patch Place 1 patch onto the skin daily, Disp-30 patch, R-0, Local Print      omeprazole (PRILOSEC) 40 MG DR capsule  Take 1 capsule (40 mg) by mouth every morning (before breakfast), Disp-15 capsule, R-0, Local Print      potassium chloride ER (KLOR-CON M) 20 MEQ CR tablet Take 1 tablet (20 mEq) by mouth 2 times daily, Disp-4 tablet, R-0, Local Print      thiamine (B-1) 100 MG tablet Take 1 tablet (100 mg) by mouth daily, Disp-30 tablet, R-0, Local Print         CONTINUE these medications which have NOT CHANGED    Details   ferrous sulfate (FE TABS) 325 (65 Fe) MG EC tablet Take 325 mg by mouth daily, Historical      hydrOXYzine (ATARAX) 25 MG tablet Take 25-50 mg by mouth 3 times daily as needed for anxiety , Historical      multivitamin w/minerals (THERA-VIT-M) tablet Take 1 tablet by mouth daily, Historical      traZODone (DESYREL) 50 MG tablet Take  mg by mouth nightly as needed for sleep, Historical      OLANZapine (ZYPREXA) 10 MG tablet Take 10 mg by mouth At Bedtime, Historical         STOP taking these medications       ibuprofen (ADVIL/MOTRIN) 400 MG tablet Comments:   Reason for Stopping:             Allergies   No Known Allergies

## 2021-05-06 ENCOUNTER — OFFICE VISIT (OUTPATIENT)
Dept: FAMILY MEDICINE | Facility: CLINIC | Age: 31
End: 2021-05-06
Payer: COMMERCIAL

## 2021-05-06 VITALS
DIASTOLIC BLOOD PRESSURE: 82 MMHG | WEIGHT: 202 LBS | TEMPERATURE: 98 F | HEART RATE: 127 BPM | OXYGEN SATURATION: 98 % | BODY MASS INDEX: 27.59 KG/M2 | SYSTOLIC BLOOD PRESSURE: 136 MMHG

## 2021-05-06 DIAGNOSIS — F32.A ANXIETY AND DEPRESSION: ICD-10-CM

## 2021-05-06 DIAGNOSIS — D64.9 ANEMIA, UNSPECIFIED TYPE: ICD-10-CM

## 2021-05-06 DIAGNOSIS — Z71.6 ENCOUNTER FOR TOBACCO USE CESSATION COUNSELING: ICD-10-CM

## 2021-05-06 DIAGNOSIS — F10.20 ALCOHOLISM (H): ICD-10-CM

## 2021-05-06 DIAGNOSIS — R03.0 ELEVATED BP WITHOUT DIAGNOSIS OF HYPERTENSION: Primary | ICD-10-CM

## 2021-05-06 DIAGNOSIS — F41.9 ANXIETY AND DEPRESSION: ICD-10-CM

## 2021-05-06 PROCEDURE — 99213 OFFICE O/P EST LOW 20 MIN: CPT | Performed by: NURSE PRACTITIONER

## 2021-05-06 RX ORDER — HYDROXYZINE HYDROCHLORIDE 25 MG/1
25-50 TABLET, FILM COATED ORAL 3 TIMES DAILY PRN
Qty: 30 TABLET | Refills: 0 | Status: ON HOLD | OUTPATIENT
Start: 2021-05-06 | End: 2021-07-01

## 2021-05-06 RX ORDER — FOLIC ACID 1 MG/1
1 TABLET ORAL DAILY
Qty: 30 TABLET | Refills: 0 | Status: ON HOLD | OUTPATIENT
Start: 2021-05-06 | End: 2021-07-01

## 2021-05-06 RX ORDER — LANOLIN ALCOHOL/MO/W.PET/CERES
100 CREAM (GRAM) TOPICAL DAILY
Qty: 30 TABLET | Refills: 0 | Status: ON HOLD | OUTPATIENT
Start: 2021-05-06 | End: 2021-07-01

## 2021-05-06 RX ORDER — FERROUS SULFATE 325(65) MG
325 TABLET, DELAYED RELEASE (ENTERIC COATED) ORAL DAILY
Status: ON HOLD | COMMUNITY
Start: 2021-05-06 | End: 2021-07-01

## 2021-05-06 ASSESSMENT — PAIN SCALES - GENERAL: PAINLEVEL: NO PAIN (0)

## 2021-05-06 NOTE — NURSING NOTE
30 year old  Chief Complaint   Patient presents with     Hospital F/U     Hospital follow up.       Blood pressure 136/82, pulse 127, temperature 98  F (36.7  C), weight 91.6 kg (202 lb), SpO2 98 %. Body mass index is 27.59 kg/m .  BP completed using cuff size:      Nilsa Louis, STEVO  May 6, 2021 3:13 PM

## 2021-05-06 NOTE — PROGRESS NOTES
Assessment & Plan      Diagnosis Comments   1. Elevated BP without diagnosis of hypertension  BP improved today, continue to monitor     2. Alcoholism (H)  thiamine (B-1) 100 MG tablet, folic acid (FOLVITE) 1 MG tablet.  Continue for now, follow-up with outpatient psychiatry and psychology    3. Anxiety and depression  hydrOXYzine (ATARAX) 25 MG tablet   4. Encounter for tobacco use cessation counseling  Patient plans on continuing to cut back on cigarettes use, using nicotine patches   5. Anemia, unspecified type  ferrous sulfate (FE TABS) 325 (65 Fe) MG EC tablet.   Check ferritin and iron studies at next appointemnt         10 minutes spent on the date of the encounter doing chart review, history and exam, documentation and further activities per the note      See patient instructions -   Follow-up with outpatient psychology and psychiatry   Return to clinic in 1 month or sooner if needed.   Return to clinic if no improvement or symptoms worsen.  Patient verbalized understanding & agreed with plan of care.      I was present with the NPP student who participated in the service and in the documentation of the services provided. I have verified the history and personally performed the physical exam and medical decision making, as documented by the student and edited by me    MATEO Burkett CNP  05/06/21  3:41 PM      MATEO [employer] Student    MATEO Burkett CNP  [facility] [place]    Joseph Resendiz is a 30 year old who presents for the following health issues     HPI     Pt here for follow-up visit regarding alcohol intoxication. Pt states he has been feeling well with no abdominal complaints. 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 He denies any thoughts of self-harm or suicide. He denies any cravings to drink. Declines rehab, but states he will reach out to friends or family if  he feels the need to drink.   BP has improved since last visit. States he does check at home and BP is normally in the 120s/80s, but he is more anxious at doctor appointments. HR today was 127. Pt denies chest pain, heart palpitations, and SOB.  Ankle pain has resolved since last visit.      Review of Systems   Constitutional, HEENT, cardiovascular, pulmonary, gi and gu systems are negative, except as otherwise noted.      Objective    /82   Pulse 127   Temp 98  F (36.7  C)   Wt 91.6 kg (202 lb)   SpO2 98%   BMI 27.59 kg/m    Body mass index is 27.59 kg/m .  Physical Exam   GENERAL: healthy, alert and no distress  RESP: lungs clear to auscultation - no rales, rhonchi or wheezes  CV: regular rhythm, normal S1 S2, no S3 or S4, no murmur, click or rub, no peripheral edema and peripheral pulses strong. Tachycardia.  MS: no gross musculoskeletal defects noted, no edema  PSYCH: mentation appears normal, affect normal/bright

## 2021-05-06 NOTE — PATIENT INSTRUCTIONS
[facility] Medication Refill Request Information:  * Please contact your pharmacy regarding ANY request for medication refills.  ** NP Clinic Prescription Fax = [phone]  * Please allow 3 business days for routine medication refills.  * Please allow 5 business days for controlled substance medication refills.     [facility] Test Result notification information:  *You will be notified with in 7-10 days of your appointment day regarding the results of your test.  If you are on MyChart you will be notified as soon as the provider has reviewed the results and signed off on them.    [facility]: [phone]     If you have questions regarding Covid-19 and the Covid-19 vaccine, please visit this website.    [email or web address]

## 2021-06-28 ENCOUNTER — HOSPITAL ENCOUNTER (INPATIENT)
Facility: CLINIC | Age: 31
LOS: 3 days | Discharge: PSYCHIATRIC OR SUBSTANCE USE FACILITY | End: 2021-07-02
Attending: EMERGENCY MEDICINE | Admitting: PSYCHIATRY & NEUROLOGY
Payer: COMMERCIAL

## 2021-06-28 DIAGNOSIS — F32.A ANXIETY AND DEPRESSION: ICD-10-CM

## 2021-06-28 DIAGNOSIS — Z20.822 CONTACT WITH AND (SUSPECTED) EXPOSURE TO COVID-19: ICD-10-CM

## 2021-06-28 DIAGNOSIS — F10.20 ALCOHOLISM (H): ICD-10-CM

## 2021-06-28 DIAGNOSIS — F41.9 ANXIETY AND DEPRESSION: ICD-10-CM

## 2021-06-28 DIAGNOSIS — F10.220 ALCOHOL DEPENDENCE WITH UNCOMPLICATED INTOXICATION (H): ICD-10-CM

## 2021-06-28 DIAGNOSIS — F39 MOOD DISORDER (H): ICD-10-CM

## 2021-06-28 DIAGNOSIS — D64.9 ANEMIA, UNSPECIFIED TYPE: ICD-10-CM

## 2021-06-28 LAB
ALBUMIN SERPL-MCNC: 4.2 G/DL (ref 3.4–5)
ALCOHOL BREATH TEST: 0.27 (ref 0–0.01)
ALP SERPL-CCNC: 108 U/L (ref 40–150)
ALT SERPL W P-5'-P-CCNC: 95 U/L (ref 0–70)
ANION GAP SERPL CALCULATED.3IONS-SCNC: 8 MMOL/L (ref 3–14)
AST SERPL W P-5'-P-CCNC: 99 U/L (ref 0–45)
BILIRUB SERPL-MCNC: 0.3 MG/DL (ref 0.2–1.3)
BUN SERPL-MCNC: 16 MG/DL (ref 7–30)
CALCIUM SERPL-MCNC: 8.5 MG/DL (ref 8.5–10.1)
CHLORIDE SERPL-SCNC: 104 MMOL/L (ref 94–109)
CO2 SERPL-SCNC: 29 MMOL/L (ref 20–32)
CREAT SERPL-MCNC: 0.7 MG/DL (ref 0.66–1.25)
GFR SERPL CREATININE-BSD FRML MDRD: >90 ML/MIN/{1.73_M2}
GLUCOSE SERPL-MCNC: 88 MG/DL (ref 70–99)
LABORATORY COMMENT REPORT: NORMAL
POTASSIUM SERPL-SCNC: 4.3 MMOL/L (ref 3.4–5.3)
PROT SERPL-MCNC: 8.2 G/DL (ref 6.8–8.8)
SARS-COV-2 RNA RESP QL NAA+PROBE: NEGATIVE
SODIUM SERPL-SCNC: 141 MMOL/L (ref 133–144)
SPECIMEN SOURCE: NORMAL

## 2021-06-28 PROCEDURE — 80053 COMPREHEN METABOLIC PANEL: CPT | Performed by: EMERGENCY MEDICINE

## 2021-06-28 PROCEDURE — 87635 SARS-COV-2 COVID-19 AMP PRB: CPT | Performed by: EMERGENCY MEDICINE

## 2021-06-28 PROCEDURE — 85025 COMPLETE CBC W/AUTO DIFF WBC: CPT | Performed by: EMERGENCY MEDICINE

## 2021-06-28 PROCEDURE — 99284 EMERGENCY DEPT VISIT MOD MDM: CPT | Performed by: EMERGENCY MEDICINE

## 2021-06-28 PROCEDURE — 82075 ASSAY OF BREATH ETHANOL: CPT | Performed by: EMERGENCY MEDICINE

## 2021-06-28 PROCEDURE — 99285 EMERGENCY DEPT VISIT HI MDM: CPT | Performed by: EMERGENCY MEDICINE

## 2021-06-28 PROCEDURE — HZ2ZZZZ DETOXIFICATION SERVICES FOR SUBSTANCE ABUSE TREATMENT: ICD-10-PCS | Performed by: PSYCHIATRY & NEUROLOGY

## 2021-06-28 PROCEDURE — C9803 HOPD COVID-19 SPEC COLLECT: HCPCS | Performed by: EMERGENCY MEDICINE

## 2021-06-28 ASSESSMENT — LIFESTYLE VARIABLES: INTOXICATION: 1

## 2021-06-29 PROBLEM — F10.220 ALCOHOL DEPENDENCE WITH UNCOMPLICATED INTOXICATION (H): Status: ACTIVE | Noted: 2021-06-29

## 2021-06-29 LAB
AMPHETAMINES UR QL SCN: NEGATIVE
BARBITURATES UR QL: NEGATIVE
BASOPHILS # BLD AUTO: 0 10E9/L (ref 0–0.2)
BASOPHILS NFR BLD AUTO: 0.7 %
BENZODIAZ UR QL: NEGATIVE
CANNABINOIDS UR QL SCN: POSITIVE
COCAINE UR QL: NEGATIVE
DIFFERENTIAL METHOD BLD: NORMAL
EOSINOPHIL # BLD AUTO: 0 10E9/L (ref 0–0.7)
EOSINOPHIL NFR BLD AUTO: 0.5 %
ERYTHROCYTE [DISTWIDTH] IN BLOOD BY AUTOMATED COUNT: 13.4 % (ref 10–15)
ETHANOL UR QL SCN: POSITIVE
HCT VFR BLD AUTO: 49.8 % (ref 40–53)
HGB BLD-MCNC: 17.2 G/DL (ref 13.3–17.7)
IMM GRANULOCYTES # BLD: 0 10E9/L (ref 0–0.4)
IMM GRANULOCYTES NFR BLD: 0.2 %
LYMPHOCYTES # BLD AUTO: 2.2 10E9/L (ref 0.8–5.3)
LYMPHOCYTES NFR BLD AUTO: 37.7 %
MCH RBC QN AUTO: 30.8 PG (ref 26.5–33)
MCHC RBC AUTO-ENTMCNC: 34.5 G/DL (ref 31.5–36.5)
MCV RBC AUTO: 89 FL (ref 78–100)
MONOCYTES # BLD AUTO: 0.5 10E9/L (ref 0–1.3)
MONOCYTES NFR BLD AUTO: 8.5 %
NEUTROPHILS # BLD AUTO: 3 10E9/L (ref 1.6–8.3)
NEUTROPHILS NFR BLD AUTO: 52.4 %
NRBC # BLD AUTO: 0 10*3/UL
NRBC BLD AUTO-RTO: 0 /100
OPIATES UR QL SCN: NEGATIVE
PLATELET # BLD AUTO: 280 10E9/L (ref 150–450)
RBC # BLD AUTO: 5.59 10E12/L (ref 4.4–5.9)
WBC # BLD AUTO: 5.8 10E9/L (ref 4–11)

## 2021-06-29 PROCEDURE — 250N000013 HC RX MED GY IP 250 OP 250 PS 637: Performed by: PHYSICIAN ASSISTANT

## 2021-06-29 PROCEDURE — 250N000011 HC RX IP 250 OP 636: Performed by: PSYCHIATRY & NEUROLOGY

## 2021-06-29 PROCEDURE — 80307 DRUG TEST PRSMV CHEM ANLYZR: CPT | Performed by: EMERGENCY MEDICINE

## 2021-06-29 PROCEDURE — 128N000004 HC R&B CD ADULT

## 2021-06-29 PROCEDURE — 80320 DRUG SCREEN QUANTALCOHOLS: CPT | Performed by: EMERGENCY MEDICINE

## 2021-06-29 PROCEDURE — 250N000013 HC RX MED GY IP 250 OP 250 PS 637: Performed by: PSYCHIATRY & NEUROLOGY

## 2021-06-29 PROCEDURE — 99223 1ST HOSP IP/OBS HIGH 75: CPT | Mod: AI | Performed by: PSYCHIATRY & NEUROLOGY

## 2021-06-29 PROCEDURE — 99207 PR CDG-MDM COMPONENT: MEETS HIGH - UP CODED: CPT | Performed by: PSYCHIATRY & NEUROLOGY

## 2021-06-29 RX ORDER — OLANZAPINE 10 MG/1
10 TABLET ORAL AT BEDTIME
Status: DISCONTINUED | OUTPATIENT
Start: 2021-06-29 | End: 2021-07-02 | Stop reason: HOSPADM

## 2021-06-29 RX ORDER — ACETAMINOPHEN 325 MG/1
650 TABLET ORAL EVERY 4 HOURS PRN
Status: DISCONTINUED | OUTPATIENT
Start: 2021-06-29 | End: 2021-07-02 | Stop reason: HOSPADM

## 2021-06-29 RX ORDER — FERROUS SULFATE 325(65) MG
325 TABLET ORAL DAILY
Status: DISCONTINUED | OUTPATIENT
Start: 2021-06-29 | End: 2021-07-02 | Stop reason: HOSPADM

## 2021-06-29 RX ORDER — SERTRALINE HYDROCHLORIDE 100 MG/1
100 TABLET, FILM COATED ORAL DAILY
Status: DISCONTINUED | OUTPATIENT
Start: 2021-06-29 | End: 2021-07-02 | Stop reason: HOSPADM

## 2021-06-29 RX ORDER — MULTIVITAMIN,THERAPEUTIC
1 TABLET ORAL DAILY
Status: DISCONTINUED | OUTPATIENT
Start: 2021-06-29 | End: 2021-07-02 | Stop reason: HOSPADM

## 2021-06-29 RX ORDER — AMOXICILLIN 250 MG
1 CAPSULE ORAL 2 TIMES DAILY PRN
Status: DISCONTINUED | OUTPATIENT
Start: 2021-06-29 | End: 2021-07-02 | Stop reason: HOSPADM

## 2021-06-29 RX ORDER — LORAZEPAM 1 MG/1
1-4 TABLET ORAL EVERY 30 MIN PRN
Status: DISCONTINUED | OUTPATIENT
Start: 2021-06-29 | End: 2021-06-29

## 2021-06-29 RX ORDER — SERTRALINE HYDROCHLORIDE 100 MG/1
100 TABLET, FILM COATED ORAL DAILY
Status: ON HOLD | COMMUNITY
Start: 2021-05-13 | End: 2021-07-01

## 2021-06-29 RX ORDER — OMEPRAZOLE 40 MG/1
40 CAPSULE, DELAYED RELEASE ORAL DAILY
Status: ON HOLD | COMMUNITY
End: 2021-07-01

## 2021-06-29 RX ORDER — ONDANSETRON 4 MG/1
4 TABLET, ORALLY DISINTEGRATING ORAL EVERY 6 HOURS PRN
Status: DISCONTINUED | OUTPATIENT
Start: 2021-06-29 | End: 2021-07-02 | Stop reason: HOSPADM

## 2021-06-29 RX ORDER — FOLIC ACID 1 MG/1
1 TABLET ORAL DAILY
Status: DISCONTINUED | OUTPATIENT
Start: 2021-06-29 | End: 2021-07-02 | Stop reason: HOSPADM

## 2021-06-29 RX ORDER — LANOLIN ALCOHOL/MO/W.PET/CERES
100 CREAM (GRAM) TOPICAL DAILY
Status: DISCONTINUED | OUTPATIENT
Start: 2021-06-29 | End: 2021-07-02 | Stop reason: HOSPADM

## 2021-06-29 RX ORDER — CLONIDINE HYDROCHLORIDE 0.1 MG/1
0.1 TABLET ORAL 2 TIMES DAILY PRN
Status: DISCONTINUED | OUTPATIENT
Start: 2021-06-29 | End: 2021-07-02 | Stop reason: HOSPADM

## 2021-06-29 RX ORDER — PANTOPRAZOLE SODIUM 40 MG/1
40 TABLET, DELAYED RELEASE ORAL
Status: DISCONTINUED | OUTPATIENT
Start: 2021-06-29 | End: 2021-07-02 | Stop reason: HOSPADM

## 2021-06-29 RX ORDER — MAGNESIUM HYDROXIDE/ALUMINUM HYDROXICE/SIMETHICONE 120; 1200; 1200 MG/30ML; MG/30ML; MG/30ML
30 SUSPENSION ORAL EVERY 4 HOURS PRN
Status: DISCONTINUED | OUTPATIENT
Start: 2021-06-29 | End: 2021-07-02 | Stop reason: HOSPADM

## 2021-06-29 RX ORDER — ATENOLOL 50 MG/1
50 TABLET ORAL DAILY PRN
Status: DISCONTINUED | OUTPATIENT
Start: 2021-06-29 | End: 2021-07-02 | Stop reason: HOSPADM

## 2021-06-29 RX ORDER — TRAZODONE HYDROCHLORIDE 50 MG/1
50-100 TABLET, FILM COATED ORAL
Status: DISCONTINUED | OUTPATIENT
Start: 2021-06-29 | End: 2021-07-02 | Stop reason: HOSPADM

## 2021-06-29 RX ORDER — PANTOPRAZOLE SODIUM 40 MG/1
40 TABLET, DELAYED RELEASE ORAL
Status: ON HOLD | COMMUNITY
Start: 2021-01-19 | End: 2021-06-29

## 2021-06-29 RX ORDER — LOPERAMIDE HCL 2 MG
2 CAPSULE ORAL 4 TIMES DAILY PRN
Status: DISCONTINUED | OUTPATIENT
Start: 2021-06-29 | End: 2021-07-02 | Stop reason: HOSPADM

## 2021-06-29 RX ORDER — DIAZEPAM 5 MG
5-20 TABLET ORAL EVERY 30 MIN PRN
Status: DISCONTINUED | OUTPATIENT
Start: 2021-06-29 | End: 2021-07-02 | Stop reason: HOSPADM

## 2021-06-29 RX ORDER — HYDROXYZINE HYDROCHLORIDE 25 MG/1
25-50 TABLET, FILM COATED ORAL 3 TIMES DAILY PRN
Status: DISCONTINUED | OUTPATIENT
Start: 2021-06-29 | End: 2021-07-02 | Stop reason: HOSPADM

## 2021-06-29 RX ADMIN — TRAZODONE HYDROCHLORIDE 100 MG: 50 TABLET ORAL at 20:08

## 2021-06-29 RX ADMIN — ATENOLOL 50 MG: 50 TABLET ORAL at 11:39

## 2021-06-29 RX ADMIN — ONDANSETRON 4 MG: 4 TABLET, ORALLY DISINTEGRATING ORAL at 16:21

## 2021-06-29 RX ADMIN — DIAZEPAM 10 MG: 5 TABLET ORAL at 20:08

## 2021-06-29 RX ADMIN — DIAZEPAM 10 MG: 5 TABLET ORAL at 07:59

## 2021-06-29 RX ADMIN — CLONIDINE HYDROCHLORIDE 0.1 MG: 0.1 TABLET ORAL at 16:21

## 2021-06-29 RX ADMIN — DIAZEPAM 10 MG: 5 TABLET ORAL at 16:06

## 2021-06-29 RX ADMIN — DIAZEPAM 10 MG: 5 TABLET ORAL at 09:34

## 2021-06-29 RX ADMIN — OLANZAPINE 10 MG: 10 TABLET, FILM COATED ORAL at 20:07

## 2021-06-29 RX ADMIN — DIAZEPAM 10 MG: 5 TABLET ORAL at 11:38

## 2021-06-29 RX ADMIN — ONDANSETRON 4 MG: 4 TABLET, ORALLY DISINTEGRATING ORAL at 07:57

## 2021-06-29 ASSESSMENT — ACTIVITIES OF DAILY LIVING (ADL)
HYGIENE/GROOMING: INDEPENDENT
DIFFICULTY_EATING/SWALLOWING: NO
DIFFICULTY_COMMUNICATING: NO
WALKING_OR_CLIMBING_STAIRS: OTHER (SEE COMMENTS)
FALL_HISTORY_WITHIN_LAST_SIX_MONTHS: NO
DRESS: INDEPENDENT
ORAL_HYGIENE: INDEPENDENT
DOING_ERRANDS_INDEPENDENTLY_DIFFICULTY: NO
DRESSING/BATHING_DIFFICULTY: NO
CONCENTRATING,_REMEMBERING_OR_MAKING_DECISIONS_DIFFICULTY: NO
TOILETING_ISSUES: NO
HEARING_DIFFICULTY_OR_DEAF: NO
WALKING_OR_CLIMBING_STAIRS_DIFFICULTY: NO
WEAR_GLASSES_OR_BLIND: NO

## 2021-06-29 NOTE — PLAN OF CARE
Problem: Alcohol Withdrawal  Signs and symptoms of listed problems will be absent or manageable.   SBAR    S = Situation:   Voluntary admit  B  = Background:   Martín Shoemaker is a 30 year old male who presented to the Emergency Department seeking detox from alchohol (blew 0.271).  Patient has a past medical history significant for substance abuse, alcohol withdrawals including seizures (last seizure 4 months ago) and DTs, gastric ulcer, HTN, and depression.  Patient reported he had been sober from alcohol for 30 days until he relapsed yesterday, drinking 1 pint of vodka daily.  Patient also reports smoking marijuana, but he denies other substance use.   A  =  Assessment:   Vital Signs: /104,  (Pt indicated that his pulse runs tachycardic), Temp 97.8, RR 16  MSSA  7;     Notable labs: UTOX positive for ethanol and cannabinoids; COVID negative. ALT 95 (H), AST 99 (H);     Pt irritable and cooperative upon approach; Alert and oriented X 3; Denies SI, SIB, HI, and HA. Affect flat, blunted, and tense; mood depressed and shameful. eye contact poor -pt failed to make eye contact with writer; speech concentration appearance  gait insight judgment.    R =   Request or Recommendation:   Alcohol withdrawal monitoring, Dr. Mcdaniels to evaluate, case management to see--pt reports he is interested in going to inpatient treatment after detox.

## 2021-06-29 NOTE — H&P
Martín Shoemaker is a 30 year old male who was referred by SELF     History was provided by PATEINT who was a fair historian.   CHIEF COMPLAINT: Alcohol    HISTORY OF PRESENT ILLNESS:    Martín Shoemaker is a 30 year old male.Patient has a past medical history significant for substance abuse, alcohol withdrawals including seizures and DTs, gastric ulcer, HTN, and depression.  Patient reports he had been sober from alcohol for 30 days until he relapsed yesterday.  It is not clear what caused him to relapse        Patient has been using the following substances: Alcohol  Started at age 13 or 58, became a problem at 3 years ago after a bad break-up.  He says this particular relapse is only for a couple of days he is drinking 1 pint.  He is having significant with withdrawals shaky nausea vomiting cold sweats he does have a history of withdrawal seizures this is what prompted him to come get help    Patient has tolerance, withdrawal, progressive use, loss of control, spending more time and more amount than intended. Patient has made attempts to quit, is experiencing cravings, and reports negative consequences.               Patient does have a history of seizures.  Patient does not have a history of delirium tremens.               Denies thoughts of suicide or harming others.      Denies auditory or visual hallucinations.     Patient smokes 0 cigarettes he denies any illicit drug use      Patient denied any gambling      PSYCHIATRIC REVIEW OF SYSTEMS:         Psychiatric Review of Systems:   Depression:    Denied depressed mood, suicidal ideation, decreased interest, changes in sleep, changes in appetite, guilt, hopelessness, helplessness, impaired concentration, decreased energy, irritability.   Denies: depressed mood, suicidal ideation, decreased interest, changes in sleep, changes in appetite, guilt, hopelessness, helplessness, impaired concentration, decreased energy, irritability.  Janene:    Denied sleeplessness,  impulsiveness, racing thoughts, increased goal-directed activities, pressured speech, increase in energy  Janene Feeling euphoric,Distractible,Impulsive,Grandiose,Talking excessively,Have energy without sleeping,Mood swings,Irritability  Denies: sleeplessness, increased goal-directed activities, abrupt increase in energy, pressured speech  Psychosis:     Denies: visual hallucinations, auditory hallucinations, paranoia  Anxiety:    Denied excessive worries that are difficult to control for the past 6 months,   Chronic anxiety , not able to stop worrying impacting sleep, poor conc, irritable , muscle tension    Anxiety  Pt has following s/o of anxiety  Feeling anxious all the time,Excessive worry,Not able to stop worrying,Impacting sleep,Concentration,Muscle tension,Irritability,Fatigue  Denied panic attacks      Denies: worries that are difficult to control for the past 6 months, panic attacks  PTSD:     Denies: re-experiencing past trauma, nightmares, increased arousal, avoidance of traumatic stimuli, impaired function.  OCD:     Denies: obsessions, checking, symmetry, cleaning, skin picking.  ED:     Denies: restriction, binging, purging.    Denied symptoms of attention deficit disorder include a failure to pay attention to detail, a pattern of careless mistakes, a pattern of inattentive listening, a failure to follow through with projects, poor personal organization, losing necessary objects, distractibility, forgetfulness.    Denied symptoms of borderline personality disorder include a fear of abandonment, unstable self-image, impulsive behavior, dissociative feeling, intense anger, unstable personal relationships, chronic feelings of boredom, periods of intense depressed mood.              PSYCHIATRIC HISTORY         Past court commitments: none  SIB /SUICIDE ATTEMPTS NONE  Psych Hosp : 5 or 6 times last hospitalization was for substance-induced paranoia he was using cocaine at that time    Inpatient cd trt 6 CD  treatments vague about details  Out pt cd trt        SOCIAL HISTORY                                                                         Is a  he is single lives in [place]        Family History:   FAMILY HISTORY:   Family History   Problem Relation Age of Onset     Hypertension Mother      Cerebrovascular Disease Maternal Grandfather      Cerebrovascular Disease Paternal Grandfather      Family Mental Health History-  none    Substance Use Problems -none             PTA Medications:     Medications Prior to Admission   Medication Sig Dispense Refill Last Dose     ferrous sulfate (FE TABS) 325 (65 Fe) MG EC tablet Take 1 tablet (325 mg) by mouth daily   6/28/2021     folic acid (FOLVITE) 1 MG tablet Take 1 tablet (1 mg) by mouth daily 30 tablet 0 6/28/2021     hydrOXYzine (ATARAX) 25 MG tablet Take 1-2 tablets (25-50 mg) by mouth 3 times daily as needed for anxiety 30 tablet 0 6/28/2021     multivitamin, therapeutic (THERA-VIT) TABS tablet Take 1 tablet by mouth daily 30 tablet 0 6/28/2021     OLANZapine (ZYPREXA) 10 MG tablet Take 1 tablet (10 mg) by mouth At Bedtime 30 tablet 0 6/28/2021     omeprazole (PRILOSEC) 40 MG DR capsule Take 40 mg by mouth daily   Past Week     sertraline (ZOLOFT) 100 MG tablet Take 100 mg by mouth daily   Past Week     thiamine (B-1) 100 MG tablet Take 1 tablet (100 mg) by mouth daily 30 tablet 0 6/28/2021     traZODone (DESYREL) 50 MG tablet Take  mg by mouth nightly as needed for sleep   6/28/2021     nicotine (NICODERM CQ) 14 MG/24HR 24 hr patch Place 1 patch onto the skin daily 30 patch 0 More than a month          Allergies:   No Known Allergies       Labs:     Recent Results (from the past 48 hour(s))   Alcohol breath test POCT    Collection Time: 06/28/21  9:15 PM   Result Value Ref Range    Alcohol Breath Test 0.271 (A) 0.00 - 0.01   CBC with platelets differential    Collection Time: 06/28/21 11:02 PM   Result Value Ref Range    WBC 5.8 4.0 - 11.0 10e9/L     RBC Count 5.59 4.4 - 5.9 10e12/L    Hemoglobin 17.2 13.3 - 17.7 g/dL    Hematocrit 49.8 40.0 - 53.0 %    MCV 89 78 - 100 fl    MCH 30.8 26.5 - 33.0 pg    MCHC 34.5 31.5 - 36.5 g/dL    RDW 13.4 10.0 - 15.0 %    Platelet Count 280 150 - 450 10e9/L    Diff Method Automated Method     % Neutrophils 52.4 %    % Lymphocytes 37.7 %    % Monocytes 8.5 %    % Eosinophils 0.5 %    % Basophils 0.7 %    % Immature Granulocytes 0.2 %    Nucleated RBCs 0 0 /100    Absolute Neutrophil 3.0 1.6 - 8.3 10e9/L    Absolute Lymphocytes 2.2 0.8 - 5.3 10e9/L    Absolute Monocytes 0.5 0.0 - 1.3 10e9/L    Absolute Eosinophils 0.0 0.0 - 0.7 10e9/L    Absolute Basophils 0.0 0.0 - 0.2 10e9/L    Abs Immature Granulocytes 0.0 0 - 0.4 10e9/L    Absolute Nucleated RBC 0.0    Comprehensive metabolic panel    Collection Time: 06/28/21 11:02 PM   Result Value Ref Range    Sodium 141 133 - 144 mmol/L    Potassium 4.3 3.4 - 5.3 mmol/L    Chloride 104 94 - 109 mmol/L    Carbon Dioxide 29 20 - 32 mmol/L    Anion Gap 8 3 - 14 mmol/L    Glucose 88 70 - 99 mg/dL    Urea Nitrogen 16 7 - 30 mg/dL    Creatinine 0.70 0.66 - 1.25 mg/dL    GFR Estimate >90 >60 mL/min/[1.73_m2]    GFR Estimate If Black >90 >60 mL/min/[1.73_m2]    Calcium 8.5 8.5 - 10.1 mg/dL    Bilirubin Total 0.3 0.2 - 1.3 mg/dL    Albumin 4.2 3.4 - 5.0 g/dL    Protein Total 8.2 6.8 - 8.8 g/dL    Alkaline Phosphatase 108 40 - 150 U/L    ALT 95 (H) 0 - 70 U/L    AST 99 (H) 0 - 45 U/L   Asymptomatic SARS-CoV-2 COVID-19 Virus (Coronavirus) by PCR    Collection Time: 06/28/21 11:07 PM    Specimen: Nasopharyngeal   Result Value Ref Range    SARS-CoV-2 Virus Specimen Source Nasopharyngeal     SARS-CoV-2 PCR Result NEGATIVE     SARS-CoV-2 PCR Comment (Note)    Drug abuse screen 6 urine (chem dep) ([facility])    Collection Time: 06/29/21  1:37 AM   Result Value Ref Range    Amphetamine Qual Urine Negative NEG^Negative    Barbiturates Qual Urine Negative NEG^Negative    Benzodiazepine Qual Urine Negative  NEG^Negative    Cannabinoids Qual Urine Positive (A) NEG^Negative    Cocaine Qual Urine Negative NEG^Negative    Ethanol Qual Urine Positive (A) NEG^Negative    Opiates Qualitative Urine Negative NEG^Negative         BP (!) 156/111   Pulse 114   Temp 98.1  F (36.7  C) (Temporal)   Resp 16   SpO2 94%   Weight is 0 lbs 0 oz  There is no height or weight on file to calculate BMI.    Physical Exam:     ROS: 10 point ROS neg other than the symptoms noted above in the HPI.            Past Medical History:   PAST MEDICAL HISTORY:   Past Medical History:   Diagnosis Date     Depressive disorder      Hypertension     pt reports he takes meds for HTN     Substance abuse (H)      Ulcer, gastric, acute        PAST SURGICAL HISTORY:   Past Surgical History:   Procedure Laterality Date     ADENOIDECTOMY       TONSILLECTOMY Bilateral        -    -           MENTAL STATUS EXAM:      Constitutional: General appearance of patient:  Appearance:  awake, alert, appeared as age stated, adequate groomed and slightly unkempt  Attitude:  cooperative  Eye Contact:  good  Mood:   Tired  Affect:  congruent   Speech:  clear, coherent normal rate   Psychomotor Behavior:  no evidence of tardive dyskinesia, dystonia, or tics  Thought Process:  logical, linear and goal oriented  Associations:  no loose associations  Thought Content:  no evidence of psychotic thought and active suicidal ideation present  Denied any active suicidal /homicidation ideation plan intent   Insight:  fair  Judgment:  fair  Oriented to:  time, person, and place  Attention Span and Concentration:  intact  Recent and Remote Memory:  intact  Language:  english with appropriate syntax and vocabulary  Fund of Knowledge: appropriate  Muscle Strength and Tone: normal  Gait and Station: Normal     There are no abnormal or psychotic thoughts, no preoccupations, no overvalued ideas, no rumination, no obsessions, no compulsions, no somatic concerns, no hypochrondriasis, no ideas  of reference, and no delusions.  Patient denies homicidal thoughts.   Patient denies suicidal thoughts.  Patient appears to have good judgment and good insight.     Musculoskeletal: Patient shows no abnormalities of motor activity: there is no tremor, no tic, and no dystonia.  There is no apparent muscle atrophy, strength and tone appear normal, and there are no abnormal movements.  Patient has normal gait and stance.    DISCUSSION:         Assessment:         Patient has been unable to stop using drugs in the community due to both physical and psychological symptoms.  Continued use will put the patient at risk for medical and/or psychiatric complications.      Inpatient psychiatric hospitalization is warranted at this time for safety, stabilization, and possible adjustment in medications.       Diagnoses:    Alcohol use disorder severe  Alcohol withdrawal severe          Plan:   Problem list  1#alcohol use disorder severe alcohol withdrawal severe     - [facility] protocol using Valium for management of alcohol withdrawal  Patient has a pulse of 114 blood pressure 156/111 tremor proximal sweats temperature shaky sweaty nauseous  He scored 11 and required 30 mg of diazepam since his admission  - Continue thiamine, folate, and multivitamin daily    2#.  Elevated transaminases liver enzymes AST 99 ALT 95 most likely from alcohol we will put internal medicine consult        - Consider anti-craving medications prior to discharge. Pt willing to review additional information about both naltrexone and Antabuse.    Alcohol withdrawal nausea prn Zofran as needed for nausea     hydroxyzine 25 mg q4h prn for acute anxiety  Trazodone 50 mg at bedtime prn for sleep disturbances       Patient has been unable to stop using drugs in the community due to both physical and psychological symptoms.  Continued use will put the patient at risk for medical and/or psychiatric complications.    I HAVE REVIEWED LABS WITH PT AND TALKED ABOUT  RESULTS WITH PT  I HAVE REVIEWED AND SUMMARIZED OLD RECORDS including his medication reconcilation of his home medications  and PDMP   I HAVE SPOKEN WITH RN ABOUT MEDICATIONS AND DETOX SCORES  I HAVE SPOKEN WITH CM ABOUT PTS TREATMENT OPTIONS             Laboratory/Imaging:    Liver Function Studies -   Recent Labs   Lab Test 06/28/21  2302   PROTTOTAL 8.2   ALBUMIN 4.2   BILITOTAL 0.3   ALKPHOS 108   AST 99*   ALT 95*      Last Comprehensive Metabolic Panel:  Sodium   Date Value Ref Range Status   06/28/2021 141 133 - 144 mmol/L Final     Potassium   Date Value Ref Range Status   06/28/2021 4.3 3.4 - 5.3 mmol/L Final     Comment:     Specimen slightly hemolyzed, potassium may be falsely elevated     Chloride   Date Value Ref Range Status   06/28/2021 104 94 - 109 mmol/L Final     Carbon Dioxide   Date Value Ref Range Status   06/28/2021 29 20 - 32 mmol/L Final     Anion Gap   Date Value Ref Range Status   06/28/2021 8 3 - 14 mmol/L Final     Glucose   Date Value Ref Range Status   06/28/2021 88 70 - 99 mg/dL Final     Urea Nitrogen   Date Value Ref Range Status   06/28/2021 16 7 - 30 mg/dL Final     Creatinine   Date Value Ref Range Status   06/28/2021 0.70 0.66 - 1.25 mg/dL Final     GFR Estimate   Date Value Ref Range Status   06/28/2021 >90 >60 mL/min/[1.73_m2] Final     Comment:     Non  GFR Calc  Starting 12/18/2018, serum creatinine based estimated GFR (eGFR) will be   calculated using the Chronic Kidney Disease Epidemiology Collaboration   (CKD-EPI) equation.       Calcium   Date Value Ref Range Status   06/28/2021 8.5 8.5 - 10.1 mg/dL Final     Bilirubin Total   Date Value Ref Range Status   06/28/2021 0.3 0.2 - 1.3 mg/dL Final     Alkaline Phosphatase   Date Value Ref Range Status   06/28/2021 108 40 - 150 U/L Final     ALT   Date Value Ref Range Status   06/28/2021 95 (H) 0 - 70 U/L Final     AST   Date Value Ref Range Status   06/28/2021 99 (H) 0 - 45 U/L Final                   Medical  "treatment/interventions:  Medical concerns: As above    - Consults: IM consult placed. Appreciate assistance.     Legal Status: Voluntary     Safety Assessment:   Checks: Status 15  Pt has not required locked seclusion or restraints in the past 24 hours to maintain safety, please refer to RN documentation for further details.    The risks, benefits, alternatives and side effects have been discussed and are understood by the patient.       Patient will be treated in therapeutic milieu with appropriate individual and group therapies as described.  Disposition: Pending clinical stabilization. Pt does  appear interested in COMPLETE DETOX AND DO TRT  Length of stay 3-5 days        \"Much or all of the text in this note was generated through the use of Dragon Dictate voice to text software. Errors in spelling or words which appear to be out of contact are unintentional, may be present due having escaped editing\"     "

## 2021-06-29 NOTE — ED PROVIDER NOTES
[facility] EMERGENCY DEPARTMENT ([facility])  6/28/21     History     Chief Complaint   Patient presents with     Alcohol Intoxication     Here with dad, detox from alcohol, last drink this am, drinking one pint of vodka daily for 2 days, last seizure 4 months ago.     The history is provided by the patient and medical records.   Alcohol Intoxication    Martín Shoemaker is a 30 year old male who presents here to the Emergency Department with his father seeking detox from alchohol.  Patient has a past medical history significant for substance abuse, alcohol withdrawals including seizures and DTs, gastric ulcer, HTN, and depression.  Patient reports he had been sober from alcohol for 30 days until he relapsed yesterday.  Patient states he typically drinks 1 pint of vodka daily, his last drink was this morning.  He reports when he is in withdrawal he typically has shakes, nausea, vomiting, and cold sweats.  He endorses a history of withdrawal seizures, he states his last one was 4 months ago.  Patient reports smoking marijuana, but he denies other substance use.  Patient notes he has depression but he denies suicidal or homicidal ideation.  He denies hallucinations.  Patient otherwise denies feeling acutely ill, no fever, cough, chest pain, shortness breath, abdominal pain.    Past Medical History  Past Medical History:   Diagnosis Date     Depressive disorder      Hypertension     pt reports he takes meds for HTN     Substance abuse (H)      Ulcer, gastric, acute      Past Surgical History:   Procedure Laterality Date     ADENOIDECTOMY       TONSILLECTOMY Bilateral      ferrous sulfate (FE TABS) 325 (65 Fe) MG EC tablet  folic acid (FOLVITE) 1 MG tablet  hydrOXYzine (ATARAX) 25 MG tablet  multivitamin, therapeutic (THERA-VIT) TABS tablet  nicotine (NICODERM CQ) 14 MG/24HR 24 hr patch  OLANZapine (ZYPREXA) 10 MG tablet  thiamine (B-1) 100 MG tablet  traZODone (DESYREL) 50 MG tablet      No Known  Allergies  Family History  Family History   Problem Relation Age of Onset     Hypertension Mother      Cerebrovascular Disease Maternal Grandfather      Cerebrovascular Disease Paternal Grandfather      Social History   Social History     Tobacco Use     Smoking status: Light Tobacco Smoker     Smokeless tobacco: Current User     Types: Chew   Substance Use Topics     Alcohol use: Not Currently     Comment: binge, relapsed a few months ago     Drug use: Yes     Types: Marijuana     Comment: medical cannibus      Past medical history, past surgical history, medications, allergies, family history, and social history were reviewed with the patient. No additional pertinent items.       Review of Systems  A complete review of systems was performed with pertinent positives and negatives noted in the HPI, and all other systems negative.    Physical Exam   BP: (!) 190/103  Pulse: 109  Temp: 97.9  F (36.6  C)  Resp: 16  Physical Exam  GEN:  Well developed, no acute distress  HEENT:  EOMI, Mucous membranes are moist.   Cardio:  RRR, no murmur, radial pulses equal bilaterally  PULM:  Lungs clear, good air movement, no wheezes, rales,   Abd:  Soft, normal bowel sounds, no focal tenderness  Musculoskeletal:  normal range of motion, no lower extremity swelling or calf tenderness  Neuro:  Alert and oriented X3, Follows commands, moving all extremities spontaneously   Skin:  Warm, dry   Psychiatric: Normal mood and affect, no suicidal ideation, homicidal ideation, auditory or visual hallucinations  ED Course      Procedures        Labs were reviewed by me and results are shown below.     Results for orders placed or performed during the hospital encounter of 06/28/21   CBC with platelets differential     Status: None (In process)   Result Value Ref Range    WBC 5.8 4.0 - 11.0 10e9/L    RBC Count 5.59 4.4 - 5.9 10e12/L    Hemoglobin 17.2 13.3 - 17.7 g/dL    Hematocrit 49.8 40.0 - 53.0 %    MCV 89 78 - 100 fl    MCH 30.8 26.5 - 33.0  pg    MCHC 34.5 31.5 - 36.5 g/dL    RDW 13.4 10.0 - 15.0 %    Platelet Count PENDING 150 - 450 10e9/L    Diff Method PENDING    Comprehensive metabolic panel     Status: Abnormal   Result Value Ref Range    Sodium 141 133 - 144 mmol/L    Potassium 4.3 3.4 - 5.3 mmol/L    Chloride 104 94 - 109 mmol/L    Carbon Dioxide 29 20 - 32 mmol/L    Anion Gap 8 3 - 14 mmol/L    Glucose 88 70 - 99 mg/dL    Urea Nitrogen 16 7 - 30 mg/dL    Creatinine 0.70 0.66 - 1.25 mg/dL    GFR Estimate >90 >60 mL/min/[1.73_m2]    GFR Estimate If Black >90 >60 mL/min/[1.73_m2]    Calcium 8.5 8.5 - 10.1 mg/dL    Bilirubin Total 0.3 0.2 - 1.3 mg/dL    Albumin 4.2 3.4 - 5.0 g/dL    Protein Total 8.2 6.8 - 8.8 g/dL    Alkaline Phosphatase 108 40 - 150 U/L    ALT 95 (H) 0 - 70 U/L    AST 99 (H) 0 - 45 U/L   Asymptomatic SARS-CoV-2 COVID-19 Virus (Coronavirus) by PCR     Status: None    Specimen: Nasopharyngeal   Result Value Ref Range    SARS-CoV-2 Virus Specimen Source Nasopharyngeal     SARS-CoV-2 PCR Result NEGATIVE     SARS-CoV-2 PCR Comment (Note)    Alcohol breath test POCT     Status: Abnormal   Result Value Ref Range    Alcohol Breath Test 0.271 (A) 0.00 - 0.01     Medications   LORazepam (ATIVAN) tablet 1-4 mg (has no administration in time range)        Assessments & Plan (with Medical Decision Making)   Patient presents with alcohol intoxication seeking admission to detox.  Patient has long history of heavy alcohol use, history of severe withdrawal symptoms including seizures and delirium tremens.  No acute mental health concerns, no other medical concerns today.  [facility] so [facility] is [facility] to detox here at [facility].    I have reviewed the nursing notes. I have reviewed the findings, diagnosis, plan and need for follow up with the patient.    New Prescriptions    No medications on file       Final diagnoses:   Alcohol dependence with uncomplicated intoxication (H)    I, Jessie Monroy, am serving as a trained medical scribe to document services personally performed by Kylah Riojas MD, based on the provider's statements to me.     I, Kylah Riojas MD, was physically present and have reviewed and verified the accuracy of this note documented by Jessie Monroy.     --  Kylah Riojas MD  [facility] EMERGENCY DEPARTMENT  6/28/2021     Kylah Riojas MD  06/29/21 0013

## 2021-06-29 NOTE — PROGRESS NOTES
[place] Prescription Drug Control Monitoring Note:      NARX SCORES  Narcotic  000  Sedative  000  Stimulant  000  Explanation and GuidanceOVERDOSE RISK SCORE 000  (Range 000-999)  Explanation and Guidance  ADDITIONAL RISK INDICATORS ( 0 )  Explanation and GuidanceThis NarxCare report is based on search criteria supplied and the data entered by the dispensing pharmacy. For more information about any prescription, please contact the dispensing pharmacy or the prescriber. NarxCare scores and reports are intended to aid, not replace, medical decision making. None of the information presented should be used as sole justification for providing or refusing to provide medications. The information on this report is not warranted as accurate or complete.  Graphs  RX GRAPH   Narcotic  Buprenorphine  Sedative  Stimulant  Other  Prescribers  Timeline  06/29  2m  6m  1y  2y  Buprenorphine mg  16  4  0  28  Timeline  06/29  2m  6m  1y  2y  Morphine MgEq (MME)  200  80  0  320  Timeline  06/29  2m  6m  1y  2y  Lorazepam MgEq (LME)  10  2  0  18  Timeline  06/29  2m  6m  1y  2y  *Per CDC guidance, the MME conversion factors prescribed or provided as part of the medication-assisted treatment for opioid use disorder should not be used to benchmark against dosage thresholds meant for opioids prescribed for pain. Buprenorphine products have no agreed upon morphine equivalency, and as partial opioid agonists, are not expected to be associated with overdose risk in the same dose-dependent manner as doses for full agonist opioids. MME = morphine milligram equivalents. LME = Lorazepam milligram equivalents. mg = dose in milligrams.  Summary  Summary  Total Prescriptions:  0  Total Prescribers:  0  Total Pharmacies:  0  Narcotics* (excluding Buprenorphine)  Current Qty:  0  Current MME/day:  0.00  30 Day Avg MME/day:  0.00  Sedatives*  Current Qty:  0  Current LME/day:  0.00  30 Day Avg LME/day:  0.00  Buprenorphine*  Current  Qty:  0  Current mg/day:  0.00  30 Day Avg mg/day:  0.00  Rx Data  PRESCRIPTIONS  Total Prescriptions: 0  Total Private Pay: 0  Fill Date ID Written Drug Qty Days Prescriber Rx # Pharmacy Refill Daily Dose * Pymt Type   *Per CDC guidance, the MME conversion factors prescribed or provided as part of the medication-assisted treatment for opioid use disorder should not be used to benchmark against dosage thresholds meant for opioids prescribed for pain. Buprenorphine products have no agreed upon morphine equivalency, and as partial opioid agonists, are not expected to be associated with overdose risk in the same dose-dependent manner as doses for full agonist opioids. MME = morphine milligram equivalents. LME = Lorazepam milligram equivalents. mg = dose in milligrams.  Providers  Total Providers: 0  Name Address [facility] Zipcode Phone  Pharmacies  Total Pharmacies: 0  Name Address [facility] Zipcode Phone  The report provided is based upon the search criteria entered and the corresponding data as it has been reported by dispenser(s). If erroneous information is identified or additional information is needed, please contact the dispenser or the prescriber provided on the report. Date Sold signifies the date the prescription was sold (left the pharmacy). The absence of Date Sold does not necessarily indicate the prescription was not dispensed. Fill Date represents the date the medication was filled or prepared by the pharmacy. Note, federal regulation (CFR Title 42: Part 2) requires patient consent prior to releasing certain patient data from federally funded opioid treatment programs (OTPs). As such, controlled substances dispensed from OTPs for medication-assisted treatment may not appear in the [place]  report. Morphine milligram equivalent (MME) conversion factors published by the CDC are used in the MME calculation. Per the CDC, the MME conversion factor is intended only for analytic purposes where prescription  data are used to retrospectively calculate daily MME to inform analyses of risks associated with opioid prescribing. This value does not constitute clinical guidance or recommendations for converting patients from one form of opioid analgesic to another. Per the CDC, the conversion factors for drugs prescribed or provided, as part of medication-assisted treatment for opioid use disorder should not be used to benchmark against MME dosage thresholds meant for opioids prescribed for pain. Buprenorphine products listed in the CDC s MME file do not have an associated conversion factor. Lastly, the CDC notes, in clinical practice, calculating MME for methadone often involves a sliding-scale approach, whereby the conversion factor increases with increasing dose. The conversion factor of 3 for methadone presented in this file could underestimate MME for a given patient. This report contains confidential information, including patient identifiers, and is not a public record. The information on this report must be treated as protected health information and is only to be disclosed to others as authorized by applicable state and Federal regulations.

## 2021-06-29 NOTE — PROGRESS NOTES
06/29/21 0414   Patient Belongings   Did you bring any home meds/supplements to the hospital?  Yes   Disposition of meds  Sent to security/pharmacy per site process;Other (see comment)   Patient Belongings locker;returned to patient at discharge;other (see comments)   Patient Belongings Put in Hospital Secure Location (Security or Locker, etc.) other (see comments)   Belongings Search Yes   Clothing Search Yes   Second Staff Shaka BRIONES R.N,   Comment see note   Storage Bin   Hat,face mask,Cellphone, Wallet (inside wallet) super ilana card,[employer] card, licenses,[employer] card,[employer] card,[employer] insurance card,Misc receipts, pounch with deodorant.       7/1/21: Added to storage room:  Large white clothes basket with clothing, some with strings, and some toiletries    Medical Room Bin:  Wallet, phone    Security Envelope [id number]: meds    ADMISSION:  I am responsible for any personal items that are not sent to the safe or pharmacy. [facility] is not responsible for loss, theft or damage of any property in my possession.    Patient Signature _____________________ Date/Time _____________________    Staff Signature _______________________ Date/Time _____________________    2nd Staff person, if patient is unable/unwilling to sign    ___________________________________ Date/Time _____________________    DISCHARGE:    All personal items have been returned to me.    Patient Signature _____________________ Date/Time _____________________    Staff Signature _______________________ Date/Time _____________________

## 2021-06-29 NOTE — PHARMACY-ADMISSION MEDICATION HISTORY
Admission Medication History Completed by Pharmacy    See [facility] Admission Navigator for allergy information, preferred outpatient pharmacy and prior to admission medications.     Medication History Sources:     Prescription fill history ([facility] in [place]) via [employer] data    Additional Information:  PTA medication list updated based on fill history only. All medication directions consistent with fill records and all medications refilled within the past 1 to 2 months.  Pharmacist did not interview patient. Last doses in table below were marked by admission RN   Any over the counter products, vitamins or supplements may not be accurately reflected in the PTA medication list.    Prior to Admission medications    Medication Sig Last Dose  Auth Provider   ferrous sulfate (FE TABS) 325 (65 Fe) MG EC tablet     Take 1 tablet (325 mg) by mouth daily 6/28/2021  Earnestine Kingston APRN CNP   folic acid (FOLVITE) 1 MG tablet     Take 1 tablet (1 mg) by mouth daily 6/28/2021  Earnestine Kingston APRN CNP   hydrOXYzine (ATARAX) 25 MG tablet Take 1-2 tablets (25-50 mg) by mouth 3 times daily as needed for anxiety     6/28/2021  Earnestine Kingston APRN CNP   multivitamin, therapeutic (THERA-VIT) TABS tablet     Take 1 tablet by mouth daily 6/28/2021  Vinnie Wright MD   OLANZapine (ZYPREXA) 10 MG tablet Take 1 tablet (10 mg) by mouth At Bedtime     6/28/2021  Earnestine Kingston APRN CNP   omeprazole (PRILOSEC) 40 MG DR capsule Take 40 mg by mouth daily     Past Week  Unknown, Entered By History   sertraline (ZOLOFT) 100 MG tablet Take 100 mg by mouth daily     Past Week  Reported, Patient   thiamine (B-1) 100 MG tablet Take 1 tablet (100 mg) by mouth daily 6/28/2021      Earnestine Kingston APRN CNP   traZODone (DESYREL) 50 MG tablet Take  mg by mouth nightly as needed for sleep     6/28/2021  Unknown, Entered By History   nicotine (NICODERM CQ) 14 MG/24HR 24 hr patch     Place 1 patch onto the skin daily More than  a month  Vinnie Wright MD     Date completed: 06/29/21    Medication history completed by:   Jazmyne Bonner, Pharm.D., [facility]  Behavioral Health Inpatient Pharmacist  [facility] ([facility])  Phone: *[phone] ([place]) or [phone]

## 2021-06-29 NOTE — PROGRESS NOTES
"Medicine consult ordered through psychiatry.  Pt's blood pressure continues rise.  Pt reports that he has high blood pressure and when he goes through withdrawal it gets even higher.  Medicine ordered clonidine 0.1 mg--given.  Will evaluate blood pressure again at 20:00.  Pt isolating in room-he states, \"I have a lot going on right now, I want to sleep.\"  Encouraged pt to stay awake during day hours so that he can sleep at night--He replied, \"I'm not going to be sleeping during the night either.\"  Pt reports that he didn't eat breakfast or lunch--said that he would try to eat something for dinner.  Pt's affect is flat but tense, mood is irritable.  Will continue MSSA evaluations, monitor blood pressure for response from clonidine.  "

## 2021-06-29 NOTE — PLAN OF CARE
"  Problem: Adult Inpatient Plan of Care  Goal: Readiness for Transition of Care  Outcome: No Change  Intervention: Mutually Develop Transition Plan  Recent Flowsheet Documentation  Taken 6/29/2021 0326 by Fany Mcclellan RN  Equipment Currently Used at Home: none     Behavioral  Pt oriented x 4; Pt tearful, pleasant and cooperative upon approach; Pt indicated shame over relapse, stating \"I am an idiot.\" Pt stated that he is feeling depressed and hopeless over his addiction and relapse. pt declined am medications including vitamins and Zoloft as he indicated he felt too nauseus;  Pt rates appetite as poor - declined breakfast and lunch - hydrating with encouragement; Pt isolative and withdrawn to room/self; did not attend groups; speech fluent and appropriate; Pt denied SI, SIB, HI, and hallucinations;     Medical  Pt continues in alcohol withdrawal; MSSA 16, 12, & 11 - 10 x 3 of valium give,  Pt tachycardic, tremulous, diaphoretic, and nauseous.     Plan  Pt plan is to go to treatment after discharge from detox.  "

## 2021-06-29 NOTE — PROGRESS NOTES
Attempted to meet with Pt this AM.  Pt sleeping soundly and did not wake to voice.  Will attempt to meet with Pt later in the day when alert.

## 2021-06-29 NOTE — PLAN OF CARE
Behavioral Team Discussion: (6/29/2021)    Continued Stay Criteria/Rationale: Patient admitted for Chemical Use Issues.  Plan: The following services will be provided to the patient; psychiatric assessment, medication management, therapeutic milieu, individual and group support, and skills groups.   Participants: 3A Provider: Dr. Reyna Mcdaniels MD; 3A RN: Fany Mcclellan RN; 3A CM's: Natalee Barrett  and Nya Wing.  Summary/Recommendation: Providers will assess today for treatment recommendations, discharge planning, and aftercare plans. CM will meet with pt for discharge planning.   Medical/Physical: Per ED note:  Patient has a past medical history significant for substance abuse, alcohol withdrawals including seizures and DTs, gastric ulcer, HTN, and depression.  Precautions:   Behavioral Orders   Procedures     Code 1 - Restrict to Unit     Routine Programming     As clinically indicated     Seizure precautions     Status 15     Every 15 minutes.     Withdrawal precautions     Rationale for change in precautions or plan: N/A  Progress: Initial.

## 2021-06-29 NOTE — PROGRESS NOTES
Medicine asked to review BP in patient in setting of detox.    No consult for medicine placed this AM, and not one currently ordered. For BP, will order clonidine BID PRN. Note h/o HTN documented, but no meds filled at this facility in the past, no meds filled at care everywhere.     Formal consult to follow in AM. Please review with psych and obtain orders if appropriate.     Notify on call ANA/physician if any intercurrent medical issues arise.     Kina Cruz PA-C

## 2021-06-30 PROCEDURE — 128N000004 HC R&B CD ADULT

## 2021-06-30 PROCEDURE — 99207 PR CONSULT E&M CHANGED TO INITIAL LEVEL: CPT | Performed by: PHYSICIAN ASSISTANT

## 2021-06-30 PROCEDURE — 99222 1ST HOSP IP/OBS MODERATE 55: CPT | Performed by: PHYSICIAN ASSISTANT

## 2021-06-30 PROCEDURE — 99231 SBSQ HOSP IP/OBS SF/LOW 25: CPT | Performed by: PSYCHIATRY & NEUROLOGY

## 2021-06-30 PROCEDURE — 250N000013 HC RX MED GY IP 250 OP 250 PS 637: Performed by: PSYCHIATRY & NEUROLOGY

## 2021-06-30 RX ADMIN — SERTRALINE HYDROCHLORIDE 100 MG: 100 TABLET ORAL at 09:33

## 2021-06-30 RX ADMIN — TRAZODONE HYDROCHLORIDE 100 MG: 50 TABLET ORAL at 20:11

## 2021-06-30 RX ADMIN — DIAZEPAM 10 MG: 5 TABLET ORAL at 20:11

## 2021-06-30 RX ADMIN — FERROUS SULFATE TAB 325 MG (65 MG ELEMENTAL FE) 325 MG: 325 (65 FE) TAB at 09:32

## 2021-06-30 RX ADMIN — THERA TABS 1 TABLET: TAB at 09:33

## 2021-06-30 RX ADMIN — THIAMINE HCL TAB 100 MG 100 MG: 100 TAB at 09:32

## 2021-06-30 RX ADMIN — FOLIC ACID 1 MG: 1 TABLET ORAL at 09:32

## 2021-06-30 RX ADMIN — DIAZEPAM 10 MG: 5 TABLET ORAL at 13:58

## 2021-06-30 RX ADMIN — PANTOPRAZOLE SODIUM 40 MG: 40 TABLET, DELAYED RELEASE ORAL at 09:32

## 2021-06-30 RX ADMIN — OLANZAPINE 10 MG: 10 TABLET, FILM COATED ORAL at 20:12

## 2021-06-30 RX ADMIN — DIAZEPAM 10 MG: 5 TABLET ORAL at 09:33

## 2021-06-30 ASSESSMENT — ACTIVITIES OF DAILY LIVING (ADL)
ORAL_HYGIENE: INDEPENDENT
DRESS: INDEPENDENT
LAUNDRY: WITH SUPERVISION
ORAL_HYGIENE: INDEPENDENT
HYGIENE/GROOMING: INDEPENDENT
LAUNDRY: WITH SUPERVISION
HYGIENE/GROOMING: INDEPENDENT
DRESS: INDEPENDENT

## 2021-06-30 NOTE — PROGRESS NOTES
Pt is now requesting residential treatment and is open to a referral to [facility].  Pt completed paperwork.  Will assess in AM.  Pt has LAURITA DIETRICH.

## 2021-06-30 NOTE — PLAN OF CARE
Pt continues to be monitored for alcohol withdrawal. MSSA scores this shift 8 and 7. Pt is isolative to room, poor intake, endorses moderate anxiety and depression, denies SI. Pt changed his mind this shift and decided to pursue treatment options, CD assessment complete and  informed.

## 2021-06-30 NOTE — PROGRESS NOTES
"Met with Pt to establish discharge planning.  Pt remains in bed.  States he continues to feel \"bad\" but better.  Pt is declining assessment and referral.  States he does not know what he wants to do and plans to talk with his parents about a plan.  Advised Pt to seek assistance if needs change.  Pt acknowledged.  "

## 2021-06-30 NOTE — CONSULTS
[facility]  Consult Note - Hospitalist Service     Date of Admission:  6/28/2021  Consult Requested by: Reyna Mcdaniels MD  Reason for Consult: Medical Co-management of alcohol detox     Assessment & Plan   Martín Shoemaker is a 30 year old male with PMHx of HTN, PUD, depression and alcohol dependence admitted on 6/28/2021 to inpatient psychiatry for alcohol detox.    #Alcohol dependence with alcohol withdrawal - Drinking 1 L of vodka daily for 4 days prior. Endorses a hx of withdrawal seizures, last seizure 6 months ago in setting of stopping alcohol, subsequently hospitalized on 1/2021 for alcohol withdrawal (of note patient was seen by neurology, who recommended to get brain MRI, EEG and take keppra, all of which patient declined).   -Agree with [facility] protocol, management per psychiatry   -Agree with thiamine, folic acid, and MVI    #Transaminitis  - ALT 95, AST 99, with normal T bili and alk phos. Denies any abdominal pain. Likely due to alcohol.   -Recommend repeat LFTs in 1 week with PCP in setting of alcohol cessation     #HTN - not on any medications currently. States he was on blood pressure medications 4 years ago, which he stopped on his own. Blood pressure elevated to 150s/110s yesterday. Patient received x1 dose of clonidine and now normotensive.   -Continue clonidine 0.1 mg BID PRN for SBP >170 and DBP >110   -Please notify medicine if SBP >140 consistently after completing alcohol detox   -Outpatient follow up with PCP for continued monitoring on BP    #PUD- Patient was admitted to ICU on 1/2021 after developing hematemesis and significant drop in hemoglobin with EGD finding cratered ulcer and visible vessel which was clipped. Patient denies any further bleeding and hemoglobin has since recovered.   - Continue PTA omeprazole 40 mg daily     #Depression - Continue PTA sertraline 100 mg daily, trazodone  mg at bedtime PRN, and zyprexa 10 mg QHS        Medicine will sign off. No further recommendations at this time. Please page the on-call ANA for any intercurrent medical issues which arise.       Hawa Hammer PA-C  [facility]  Securely message with the [employer] Web Console (learn more here)  Text page via [facility] Paging/Directory      Risk Factors Present on Admission               ______________________________________________________________________    Chief Complaint   Alcohol detox    History is obtained from the patient    History of Present Illness   Martín Shoemaker is a 30 year old male with PMHx of HTN, PUD, depression and alcohol dependence admitted on 6/28/2021 to inpatient psychiatry for alcohol detox.    Patient states he relapsed 4 days ago, drinking 1L of liquor daily. Endorses a hx of withdrawal seizures, last in December. Endorses cold sweats, N/V, and upset stomach consistent with prior withdrawals. States he was able to eat today without difficulties. Denies any hematemesis or black or bloody stools. Denies any fevers, CP, SOB, dysuria, hematuria, rashes or joint pain.     Patient reports having a hx of HTN. He was previously on medications 4 years ago (does not know the name), but discontinued the medication when his blood pressure was subsequently normal. Also reports a hx of PUD, with an endoscopy in December after having episodes of hematemesis, and take omeprazole. As noted above, denies any subsequent episodes of hematemesis.  Denies any other medical conditions.  Denies any other drug use. Occasionally smokes cigarettes, but declines any nicotine patch or gum.     Review of Systems   The 10 point Review of Systems is negative other than noted in the HPI or here.     Past Medical History    I have reviewed this patient's medical history and updated it with pertinent information if needed.   Past Medical History:   Diagnosis Date     Depressive disorder      Hypertension     pt reports he  takes meds for HTN     Substance abuse (H)      Ulcer, gastric, acute        Past Surgical History   I have reviewed this patient's surgical history and updated it with pertinent information if needed.  Past Surgical History:   Procedure Laterality Date     ADENOIDECTOMY       TONSILLECTOMY Bilateral        Social History   I have reviewed this patient's social history and updated it with pertinent information if needed.  Social History     Tobacco Use     Smoking status: Light Tobacco Smoker     Smokeless tobacco: Current User     Types: Chew   Substance Use Topics     Alcohol use: Not Currently     Comment: binge, relapsed a few months ago     Drug use: Yes     Types: Marijuana     Comment: medical cannibus       Family History   I have reviewed this patient's family history and updated it with pertinent information if needed.  Family History   Problem Relation Age of Onset     Hypertension Mother      Cerebrovascular Disease Maternal Grandfather      Cerebrovascular Disease Paternal Grandfather        Medications   I have reviewed this patient's current medications  Medications Prior to Admission   Medication Sig Dispense Refill Last Dose     ferrous sulfate (FE TABS) 325 (65 Fe) MG EC tablet Take 1 tablet (325 mg) by mouth daily   6/28/2021     folic acid (FOLVITE) 1 MG tablet Take 1 tablet (1 mg) by mouth daily 30 tablet 0 6/28/2021     hydrOXYzine (ATARAX) 25 MG tablet Take 1-2 tablets (25-50 mg) by mouth 3 times daily as needed for anxiety 30 tablet 0 6/28/2021     multivitamin, therapeutic (THERA-VIT) TABS tablet Take 1 tablet by mouth daily 30 tablet 0 6/28/2021     OLANZapine (ZYPREXA) 10 MG tablet Take 1 tablet (10 mg) by mouth At Bedtime 30 tablet 0 6/28/2021     omeprazole (PRILOSEC) 40 MG DR capsule Take 40 mg by mouth daily   Past Week     sertraline (ZOLOFT) 100 MG tablet Take 100 mg by mouth daily   Past Week     thiamine (B-1) 100 MG tablet Take 1 tablet (100 mg) by mouth daily 30 tablet 0  6/28/2021     traZODone (DESYREL) 50 MG tablet Take  mg by mouth nightly as needed for sleep   6/28/2021     nicotine (NICODERM CQ) 14 MG/24HR 24 hr patch Place 1 patch onto the skin daily 30 patch 0 More than a month       Allergies   No Known Allergies    Physical Exam   Vital Signs: Temp: 97.5  F (36.4  C) Temp src: Temporal BP: 103/65 Pulse: 52   Resp: 16 SpO2: 95 % O2 Device: None (Room air)    Weight: 0 lbs 0 oz  GENERAL: Alert and oriented x 3. NAD. Pleasant and conversational.   HEENT: Anicteric sclera. Mucous membranes moist   CARDIOVASCULAR: RRR. S1, S2. No murmurs, rubs, or gallops.   RESPIRATORY: Effort normal on RA. Clear to auscultation bilaterally, no rales, rhonchi or wheezes, respirations unlabored   GI: Abdomen soft, non-tender abdomen without rebound or guarding, normoactive bowel sounds present  EXTREMITIES: No peripheral edema.  No calf asymmetry, erythema, or tenderness.   NEUROLOGICAL: CN II-XII grossly intact. Moving all extremities symmetrically. Steady gait.   SKIN: Intact. Warm and dry. No jaundice.    Data   Most Recent 3 CBC's:  Recent Labs   Lab Test 06/28/21 2302 04/22/21  1503 04/15/21  0752   WBC 5.8 7.7 7.1   HGB 17.2 12.4* 14.4   MCV 89 88 84    425 111*     Most Recent 3 BMP's:  Recent Labs   Lab Test 06/28/21 2302 04/22/21  1503 04/15/21  0752    140 134   POTASSIUM 4.3 3.7 3.0*   CHLORIDE 104 105 99   CO2 29 30 26   BUN 16 16 5*   CR 0.70 0.73 0.62*   ANIONGAP 8 4 9   EMILEE 8.5 9.0 9.1   GLC 88 84 108*     Most Recent 2 LFT's:  Recent Labs   Lab Test 06/28/21 2302 04/22/21  1503   AST 99* 67*   ALT 95* 141*   ALKPHOS 108 73   BILITOTAL 0.3 0.3

## 2021-06-30 NOTE — PROGRESS NOTES
[facility], [place]   Psychiatric Progress Note        Interim history   This is a 30 year old male with Alcohol use disorder severe  Alcohol withdrawal severe.Pt seen in rounds.   The patient's care was discussed with the treatment team during the daily team meeting and/or staff's chart notes were reviewed.  Staff report patient has been visible in the milieu,  no acute eventsovernight.     Patient's mood is tired , now reports his relapse was longer 4days   Energy Level:MODERATE  Sleep:No concerns, sleeps well through night  Appetite:fairimproving  motivation interest   Improving Suicidal/homicidal ideation/plan intent.  deneid any psychosis  No prior suicde attempts  No access to gun  Pt is in alcohol withdrawal still being monitered every 4 hrs for it,   Pt mssa score are monitered  Tolerating meds and has no side effects.              Medications:     Current Facility-Administered Medications   Medication     acetaminophen (TYLENOL) tablet 650 mg     alum & mag hydroxide-simethicone (MAALOX) suspension 30 mL     atenolol (TENORMIN) tablet 50 mg     cloNIDine (CATAPRES) tablet 0.1 mg     diazepam (VALIUM) tablet 5-20 mg     ferrous sulfate (FEROSUL) tablet 325 mg     folic acid (FOLVITE) tablet 1 mg     hydrOXYzine (ATARAX) tablet 25-50 mg     loperamide (IMODIUM) capsule 2 mg     multivitamin, therapeutic (THERA-VIT) tablet 1 tablet     nicotine (NICORETTE) gum 2-4 mg     OLANZapine (zyPREXA) tablet 10 mg     ondansetron (ZOFRAN-ODT) ODT tab 4 mg     pantoprazole (PROTONIX) EC tablet 40 mg     senna-docusate (SENOKOT-S/PERICOLACE) 8.6-50 MG per tablet 1 tablet     sertraline (ZOLOFT) tablet 100 mg     thiamine (B-1) tablet 100 mg     traZODone (DESYREL) tablet  mg             Allergies:   No Known Allergies         Psychiatric Examination:   Blood pressure (!) 135/94, pulse 63, temperature 97.5  F (36.4  C), temperature source Temporal, resp. rate 16, SpO2 98 %.  Weight is 0  lbs 0 oz  There is no height or weight on file to calculate BMI.    Appearance:  awake, alert and adequately groomed  Attitude:  cooperative  Eye Contact:  good  Mood:  better  Affect:  appropriate and in normal range and mood congruent  Speech:  clear, coherent rate /rhythm are normal  Psychomotor Behavior:  no evidence of tardive dyskinesia, dystonia, or tics and intact station, gait and muscle tone  Throught Process:  logical  Associations:  no loose associations  Thought Content:  no evidence of suicidal ideation or homicidal ideation, no evidence of psychotic thought, no auditory hallucinations present and no visual hallucinations present  Insight:  fair  Judgement:  intact  Oriented to:  time, person, and place  Attention Span and Concentration:  intact  Recent and Remote Memory:  intact  Language fund of knowledge are adequate         Labs:     No results found for: NTBNPI, NTBNP  Lab Results   Component Value Date    WBC 5.8 06/28/2021    HGB 17.2 06/28/2021    HCT 49.8 06/28/2021    MCV 89 06/28/2021     06/28/2021     No results found for: TSH      DX Alcohol use disorder severe  Alcohol withdrawal severe     PLAN   Alcohol intoxication/withdrawal, presently is on MSSA protocol with Valium. Continue the same MSSA protocol as ordered. Continue thiamine 100 mg p.o. daily, M.V.I. one p.o. daily and folate 1 mg p.o. Daily  Will continue mssa protocal to detox off alcohol on valium,  Pt is c/o of termor , agitation poor sleep and poor appetite, he has sweats, feels shakey  On mssa client scored scored8 today and  needed [id number]  mg po as of yet , total dose since admission was 60mg     MSSA      Eating Disturbances  0 0     Tremor  1 3     Sleep Disturbance  0 0     Clouding of Sensorium  0 0     Hallucinations  0 0     Quality of Contact  0 0     Agitation  0 2     Paroxysmal Sweats  2 2     Temperature  1 1     Pulse  0 0     Total MSSA Score  4 8     Medications    DiazePAM (mg)  50 10          Laboratory/Imaging: reviewed with patient   Consults: internal medicine consult reviewed  Patient will be treated in therapeutic milieu with appropriate individual and group therapies as described.  PDMP CHECKED     Supportive psychotheraoy provided, sydnie talked about recovery enviroment, relapse prevention, triggers to use.  Discussed with patient many issues of addiction,triggers, relapse, and establishing a solid recovery program.  Asked pt to be med complinat   Medical diagnoses to be addressed this admission:    Consult pending       Legal Status: voluntary    Safety Assessment:   Checks:  15 min  Precautions: withdrawal precautions  Pt has not required locked seclusion or restraints in the past 24 hours to maintain safety, please refer to RN documentation for further details.  Discussed with patient many issues of addiction,triggers, relapse, and establishing a solid recovery program.  Able to give informed consent:  YES   Discussed Risks/Benefits/Side Effects/Alternatives: YES    After discussion of the indications, risks, benefits, alternatives and consequences of no treatment, the patient elects to complete detox and do trt.

## 2021-06-30 NOTE — PROGRESS NOTES
MSSA scores of 3/4 pt did not require Valium for alcohol withdrawal and is observed to sleep throughout the noc.

## 2021-07-01 PROCEDURE — 99239 HOSP IP/OBS DSCHRG MGMT >30: CPT | Performed by: PSYCHIATRY & NEUROLOGY

## 2021-07-01 PROCEDURE — 128N000004 HC R&B CD ADULT

## 2021-07-01 PROCEDURE — 250N000013 HC RX MED GY IP 250 OP 250 PS 637: Performed by: PSYCHIATRY & NEUROLOGY

## 2021-07-01 PROCEDURE — H0001 ALCOHOL AND/OR DRUG ASSESS: HCPCS | Performed by: COUNSELOR

## 2021-07-01 RX ORDER — FOLIC ACID 1 MG/1
1 TABLET ORAL DAILY
Qty: 30 TABLET | Refills: 0 | Status: ON HOLD | OUTPATIENT
Start: 2021-07-01 | End: 2023-08-31

## 2021-07-01 RX ORDER — LANOLIN ALCOHOL/MO/W.PET/CERES
100 CREAM (GRAM) TOPICAL DAILY
Qty: 30 TABLET | Refills: 0 | Status: ON HOLD | OUTPATIENT
Start: 2021-07-01 | End: 2021-08-08

## 2021-07-01 RX ORDER — PANTOPRAZOLE SODIUM 40 MG/1
40 TABLET, DELAYED RELEASE ORAL
Qty: 30 TABLET | Refills: 0 | Status: ON HOLD | OUTPATIENT
Start: 2021-07-02 | End: 2021-08-08

## 2021-07-01 RX ORDER — SERTRALINE HYDROCHLORIDE 100 MG/1
100 TABLET, FILM COATED ORAL DAILY
Qty: 30 TABLET | Refills: 0 | Status: ON HOLD | OUTPATIENT
Start: 2021-07-01 | End: 2021-08-08

## 2021-07-01 RX ORDER — OLANZAPINE 10 MG/1
10 TABLET ORAL AT BEDTIME
Qty: 30 TABLET | Refills: 0 | Status: ON HOLD | OUTPATIENT
Start: 2021-07-01 | End: 2021-08-08

## 2021-07-01 RX ORDER — TRAZODONE HYDROCHLORIDE 50 MG/1
50-100 TABLET, FILM COATED ORAL
Qty: 60 TABLET | Refills: 0 | Status: ON HOLD | OUTPATIENT
Start: 2021-07-01 | End: 2021-08-08

## 2021-07-01 RX ORDER — DISULFIRAM 250 MG/1
250 TABLET ORAL DAILY
Qty: 30 TABLET | Refills: 0 | Status: SHIPPED | OUTPATIENT
Start: 2021-07-01 | End: 2021-08-05

## 2021-07-01 RX ORDER — HYDROXYZINE HYDROCHLORIDE 25 MG/1
25-50 TABLET, FILM COATED ORAL 3 TIMES DAILY PRN
Qty: 30 TABLET | Refills: 1 | Status: ON HOLD | OUTPATIENT
Start: 2021-07-01 | End: 2021-08-08

## 2021-07-01 RX ORDER — FERROUS SULFATE 325(65) MG
325 TABLET, DELAYED RELEASE (ENTERIC COATED) ORAL DAILY
Qty: 30 TABLET | Refills: 0 | Status: ON HOLD | OUTPATIENT
Start: 2021-07-01 | End: 2023-08-31

## 2021-07-01 RX ORDER — MULTIVITAMIN,THERAPEUTIC
1 TABLET ORAL DAILY
Qty: 30 TABLET | Refills: 0 | Status: ON HOLD | OUTPATIENT
Start: 2021-07-01 | End: 2023-08-31

## 2021-07-01 RX ADMIN — OLANZAPINE 10 MG: 10 TABLET, FILM COATED ORAL at 20:34

## 2021-07-01 RX ADMIN — THIAMINE HCL TAB 100 MG 100 MG: 100 TAB at 08:53

## 2021-07-01 RX ADMIN — TRAZODONE HYDROCHLORIDE 100 MG: 50 TABLET ORAL at 20:34

## 2021-07-01 RX ADMIN — SERTRALINE HYDROCHLORIDE 100 MG: 100 TABLET ORAL at 08:52

## 2021-07-01 RX ADMIN — ATENOLOL 50 MG: 50 TABLET ORAL at 08:57

## 2021-07-01 RX ADMIN — FERROUS SULFATE TAB 325 MG (65 MG ELEMENTAL FE) 325 MG: 325 (65 FE) TAB at 08:52

## 2021-07-01 RX ADMIN — THERA TABS 1 TABLET: TAB at 08:52

## 2021-07-01 RX ADMIN — FOLIC ACID 1 MG: 1 TABLET ORAL at 08:52

## 2021-07-01 RX ADMIN — PANTOPRAZOLE SODIUM 40 MG: 40 TABLET, DELAYED RELEASE ORAL at 08:52

## 2021-07-01 RX ADMIN — DIAZEPAM 10 MG: 5 TABLET ORAL at 08:53

## 2021-07-01 ASSESSMENT — ACTIVITIES OF DAILY LIVING (ADL)
DRESS: STREET CLOTHES
LAUNDRY: UNABLE TO COMPLETE
ORAL_HYGIENE: INDEPENDENT
HYGIENE/GROOMING: INDEPENDENT

## 2021-07-01 NOTE — PROGRESS NOTES
MSSA scores of 5/4,pt required no valium for alcohol withdrawal and is observed to sleep throughout the noc.

## 2021-07-01 NOTE — PLAN OF CARE
Pt continues to be monitored for alcohol withdrawal MSSA scores this shift of 5 and 8. Pt is isolative to room, tense and anxious. Pt endorses moderate anxiety and depression.

## 2021-07-01 NOTE — PROGRESS NOTES
[facility], [place]   Psychiatric Progress Note        Interim history   This is a 30 year old male with Alcohol use disorder severe  Alcohol withdrawal severe.Pt seen in rounds.   The patient's care was discussed with the treatment team during the daily team meeting and/or staff's chart notes were reviewed.  Staff report patient has been visible in the milieu,  no acute eventsovernight.     Patient's mood is tired , now reports his relapse was longer 4days   Energy Level:MODERATE  Sleep:No concerns, sleeps well through night  Appetite:fairimproving  motivation interest   Improving Suicidal/homicidal ideation/plan intent.  deneid any psychosis  No prior suicde attempts  No access to gun  Pt is in alcohol withdrawal still being monitered every 4 hrs for it,   Pt mssa score are monitered  Tolerating meds and has no side effects.              Medications:     Current Facility-Administered Medications   Medication     acetaminophen (TYLENOL) tablet 650 mg     alum & mag hydroxide-simethicone (MAALOX) suspension 30 mL     atenolol (TENORMIN) tablet 50 mg     cloNIDine (CATAPRES) tablet 0.1 mg     diazepam (VALIUM) tablet 5-20 mg     ferrous sulfate (FEROSUL) tablet 325 mg     folic acid (FOLVITE) tablet 1 mg     hydrOXYzine (ATARAX) tablet 25-50 mg     loperamide (IMODIUM) capsule 2 mg     multivitamin, therapeutic (THERA-VIT) tablet 1 tablet     nicotine (NICORETTE) gum 2-4 mg     OLANZapine (zyPREXA) tablet 10 mg     ondansetron (ZOFRAN-ODT) ODT tab 4 mg     pantoprazole (PROTONIX) EC tablet 40 mg     senna-docusate (SENOKOT-S/PERICOLACE) 8.6-50 MG per tablet 1 tablet     sertraline (ZOLOFT) tablet 100 mg     thiamine (B-1) tablet 100 mg     traZODone (DESYREL) tablet  mg             Allergies:   No Known Allergies         Psychiatric Examination:   Blood pressure 131/80, pulse 106, temperature 97.4  F (36.3  C), temperature source Temporal, resp. rate 16, SpO2 98 %.  Weight is 0 lbs  0 oz  There is no height or weight on file to calculate BMI.    Appearance:  awake, alert and adequately groomed  Attitude:  cooperative  Eye Contact:  good  Mood:  better  Affect:  appropriate and in normal range and mood congruent  Speech:  clear, coherent rate /rhythm are normal  Psychomotor Behavior:  no evidence of tardive dyskinesia, dystonia, or tics and intact station, gait and muscle tone  Throught Process:  logical  Associations:  no loose associations  Thought Content:  no evidence of suicidal ideation or homicidal ideation, no evidence of psychotic thought, no auditory hallucinations present and no visual hallucinations present  Insight:  fair  Judgement:  intact  Oriented to:  time, person, and place  Attention Span and Concentration:  intact  Recent and Remote Memory:  intact  Language fund of knowledge are adequate         Labs:     No results found for: NTBNPI, NTBNP  Lab Results   Component Value Date    WBC 5.8 06/28/2021    HGB 17.2 06/28/2021    HCT 49.8 06/28/2021    MCV 89 06/28/2021     06/28/2021     No results found for: TSH      DX Alcohol use disorder severe  Alcohol withdrawal severe     PLAN   Alcohol intoxication/withdrawal, presently is on MSSA protocol with Valium. Continue the same MSSA protocol as ordered. Continue thiamine 100 mg p.o. daily, M.V.I. one p.o. daily and folate 1 mg p.o. Daily  Will continue mssa protocal to detox off alcohol on valium,  Pt is c/o of termor , agitation poor sleep and poor appetite, he has sweats, feels shakey  On mssa client scored scored8 today and  needed [id number]  mg po as of yet , total dose since admission was 60mg     MSSA      Eating Disturbances  0 0     Tremor  1 3     Sleep Disturbance  0 0     Clouding of Sensorium  0 0     Hallucinations  0 0     Quality of Contact  0 0     Agitation  0 2     Paroxysmal Sweats  2 2     Temperature  1 1     Pulse  0 0     Total MSSA Score  4 8     Medications    DiazePAM (mg)  50 10          Laboratory/Imaging: reviewed with patient   Consults: internal medicine consult reviewed  Patient will be treated in therapeutic milieu with appropriate individual and group therapies as described.  PDMP CHECKED     Supportive psychotheraoy provided, sydnie talked about recovery enviroment, relapse prevention, triggers to use.  Discussed with patient many issues of addiction,triggers, relapse, and establishing a solid recovery program.  Asked pt to be med complinat   Medical diagnoses to be addressed this admission:    Consult pending       Legal Status: voluntary    Safety Assessment:   Checks:  15 min  Precautions: withdrawal precautions  Pt has not required locked seclusion or restraints in the past 24 hours to maintain safety, please refer to RN documentation for further details.  Discussed with patient many issues of addiction,triggers, relapse, and establishing a solid recovery program.  Able to give informed consent:  YES   Discussed Risks/Benefits/Side Effects/Alternatives: YES    After discussion of the indications, risks, benefits, alternatives and consequences of no treatment, the patient elects to complete detox and do trt.

## 2021-07-01 NOTE — PLAN OF CARE
Patient had a good shift, visible in milieu.  Bright affect, denies SI/SIB; MSSA this shift 10 and 5, received valium x1 per protocol.  Good appetite, medication compliant.  Patient to discharge to [facility] tomorrow when out of detox.  Resting comfortably in room.  Will continue to monitor closely.

## 2021-07-01 NOTE — DISCHARGE SUMMARY
Martín Shoemaker MRN# [id number]   Age: 30 year old YOB: 1990     This this is the anticipated discharge summary     DISCHARGE  DX  Alcohol use disorder severe             Event Leading to Hospitalization:     See Admission note by admitting provider for patient encounter. for additional details.          Hospital Course:   PATIENT was admitted to [facility] attending  under DR chirinos, please review the detailed admit note on    The patient was placed under status 15 (15 minute checks) to ensure patient safety.   MSSA protocol was initiated due to the patient's history of alcohol abuse and concern for withdrawal symptoms.  CBC, BMP and utox obtained.    All outpatient medications were continued    PATIENTdid participate in groups and was visible in the milieu.     The patient's symptoms of alcohol withdrawal improved.     Patients energy motivation , sleep appetite improved.  Pt completed detox . It was un eventful.      Discussed with patient medications for craving.  Spoke with patient about triggers coping skills relapse prevention.    CONSULTS DONE DURING PATIENTS HOSPITALIZATION.  Patient was seen by medicine on date 6/30/2021    This as per their medical consult    Martín Shoemaker is a 30 year old male with PMHx of HTN, PUD, depression and alcohol dependence admitted on 6/28/2021 to inpatient psychiatry for alcohol detox.     #Alcohol dependence with alcohol withdrawal - Drinking 1 L of vodka daily for 4 days prior. Endorses a hx of withdrawal seizures, last seizure 6 months ago in setting of stopping alcohol, subsequently hospitalized on 1/2021 for alcohol withdrawal (of note patient was seen by neurology, who recommended to get brain MRI, EEG and take keppra, all of which patient declined).   -Agree with MSSA protocol, management per psychiatry   -Agree with thiamine, folic acid, and MVI     #Transaminitis  - ALT 95, AST 99, with normal T bili and alk phos. Denies any abdominal pain.  Likely due to alcohol.   -Recommend repeat LFTs in 1 week with PCP in setting of alcohol cessation     #HTN - not on any medications currently. States he was on blood pressure medications 4 years ago, which he stopped on his own. Blood pressure elevated to 150s/110s yesterday. Patient received x1 dose of clonidine and now normotensive.   -Continue clonidine 0.1 mg BID PRN for SBP >170 and DBP >110   -Please notify medicine if SBP >140 consistently after completing alcohol detox   -Outpatient follow up with PCP for continued monitoring on BP     #PUD- Patient was admitted to ICU on 1/2021 after developing hematemesis and significant drop in hemoglobin with EGD finding cratered ulcer and visible vessel which was clipped. Patient denies any further bleeding and hemoglobin has since recovered.   - Continue PTA omeprazole 40 mg daily      #Depression - Continue PTA sertraline 100 mg daily, trazodone  mg at bedtime PRN, and zyprexa 10 mg QHS        Pt was seen by cm  As per recommendations from cm  Pt assessment completed.  [facility] form completed and faxed to OP [facility].  Pt may transfer when OOD and bed available.           Labs:reviewed with patient     No results found for this or any previous visit (from the past 48 hour(s)).      Recent Results (from the past 240 hour(s))   Alcohol breath test POCT    Collection Time: 06/28/21  9:15 PM   Result Value Ref Range    Alcohol Breath Test 0.271 (A) 0.00 - 0.01   CBC with platelets differential    Collection Time: 06/28/21 11:02 PM   Result Value Ref Range    WBC 5.8 4.0 - 11.0 10e9/L    RBC Count 5.59 4.4 - 5.9 10e12/L    Hemoglobin 17.2 13.3 - 17.7 g/dL    Hematocrit 49.8 40.0 - 53.0 %    MCV 89 78 - 100 fl    MCH 30.8 26.5 - 33.0 pg    MCHC 34.5 31.5 - 36.5 g/dL    RDW 13.4 10.0 - 15.0 %    Platelet Count 280 150 - 450 10e9/L    Diff Method Automated Method     % Neutrophils 52.4 %    % Lymphocytes 37.7 %    % Monocytes 8.5 %    % Eosinophils 0.5 %    % Basophils 0.7 %     % Immature Granulocytes 0.2 %    Nucleated RBCs 0 0 /100    Absolute Neutrophil 3.0 1.6 - 8.3 10e9/L    Absolute Lymphocytes 2.2 0.8 - 5.3 10e9/L    Absolute Monocytes 0.5 0.0 - 1.3 10e9/L    Absolute Eosinophils 0.0 0.0 - 0.7 10e9/L    Absolute Basophils 0.0 0.0 - 0.2 10e9/L    Abs Immature Granulocytes 0.0 0 - 0.4 10e9/L    Absolute Nucleated RBC 0.0    Comprehensive metabolic panel    Collection Time: 06/28/21 11:02 PM   Result Value Ref Range    Sodium 141 133 - 144 mmol/L    Potassium 4.3 3.4 - 5.3 mmol/L    Chloride 104 94 - 109 mmol/L    Carbon Dioxide 29 20 - 32 mmol/L    Anion Gap 8 3 - 14 mmol/L    Glucose 88 70 - 99 mg/dL    Urea Nitrogen 16 7 - 30 mg/dL    Creatinine 0.70 0.66 - 1.25 mg/dL    GFR Estimate >90 >60 mL/min/[1.73_m2]    GFR Estimate If Black >90 >60 mL/min/[1.73_m2]    Calcium 8.5 8.5 - 10.1 mg/dL    Bilirubin Total 0.3 0.2 - 1.3 mg/dL    Albumin 4.2 3.4 - 5.0 g/dL    Protein Total 8.2 6.8 - 8.8 g/dL    Alkaline Phosphatase 108 40 - 150 U/L    ALT 95 (H) 0 - 70 U/L    AST 99 (H) 0 - 45 U/L   Asymptomatic SARS-CoV-2 COVID-19 Virus (Coronavirus) by PCR    Collection Time: 06/28/21 11:07 PM    Specimen: Nasopharyngeal   Result Value Ref Range    SARS-CoV-2 Virus Specimen Source Nasopharyngeal     SARS-CoV-2 PCR Result NEGATIVE     SARS-CoV-2 PCR Comment (Note)    Drug abuse screen 6 urine (chem dep) ([facility])    Collection Time: 06/29/21  1:37 AM   Result Value Ref Range    Amphetamine Qual Urine Negative NEG^Negative    Barbiturates Qual Urine Negative NEG^Negative    Benzodiazepine Qual Urine Negative NEG^Negative    Cannabinoids Qual Urine Positive (A) NEG^Negative    Cocaine Qual Urine Negative NEG^Negative    Ethanol Qual Urine Positive (A) NEG^Negative    Opiates Qualitative Urine Negative NEG^Negative            Because this patient meets criteria for an Alcohol Use Disorder, I performed the following brief intervention on the date of this note:              1) Expressed concern that the  patient is drinking at unhealthy levels known to increase their risk of alcohol related problems              2) Gave feedback linking alcohol use and health, including personalized feedback explaining how alcohol use can interact with their medical and/or psychiatric problems, and with prescribed medications.              3) Advised patient to abstain.      Discussed with patient many issues of addiction,triggers, relapse, and establishing a solid recovery program.    DISCHARGE MENTAL STATUS EXAMINATION:  The patient is alert, oriented x3.  Good fund of knowledge.  Good use of language.  Recent and remote memory, language, fund of knowledge are all adequate.  Euthymic mood congruent affect  Speech normal rate/rhythm linear tp no loose asso,The patient does not have any active suicidal or homicidal ideation.  Does not have any auditory or visual hallucination.  Fair insight/judgment At this time, the patient was stable to be discharged.        Pt was not determined to not be a danger to himself or others. At the current time of discharge, the patient does not meet criteria for involuntary hospitalization. On the day of discharge, the patient reports that they do not have suicidal or homicidal ideation and would never hurt themselves or others. Steps taken to minimize risk include: assessing patient s behavior and thought process daily during hospital stay, discharging patient with adequate plan for follow up for mental and physical health and discussing safety plan of returning to the hospital should the patient ever have thoughts of harming themselves or others. Therefore, based on all available evidence including the factors cited above, the patient does not appear to be at imminent risk for self-harm, and is appropriate for outpatient level of care.     Educated about side effects/risk vs benefits /alternative including non treatment.Pt consented to be on medication.     .Total time spent on discharge summary  "more than 35 min  More than  20 min  planning, coordination of care, medication reconciliation and performance of physical exam on day of discharge.Care was coordinated with unit RN and unit therapist       Martín Shoemaker   Home Medication Instructions SHERRI:[id number]    Printed on:07/01/21 1220   Medication Information                      disulfiram (ANTABUSE) 250 MG tablet  Take 1 tablet (250 mg) by mouth daily             ferrous sulfate (FE TABS) 325 (65 Fe) MG EC tablet  Take 1 tablet (325 mg) by mouth daily             folic acid (FOLVITE) 1 MG tablet  Take 1 tablet (1 mg) by mouth daily             hydrOXYzine (ATARAX) 25 MG tablet  Take 1-2 tablets (25-50 mg) by mouth 3 times daily as needed for anxiety             multivitamin, therapeutic (THERA-VIT) TABS tablet  Take 1 tablet by mouth daily             nicotine (NICORETTE) 2 MG gum  Place 1-2 each (2-4 mg) inside cheek every hour as needed for other (nicotine withdrawal symptoms)             OLANZapine (ZYPREXA) 10 MG tablet  Take 1 tablet (10 mg) by mouth At Bedtime             pantoprazole (PROTONIX) 40 MG EC tablet  Take 1 tablet (40 mg) by mouth every morning (before breakfast)             sertraline (ZOLOFT) 100 MG tablet  Take 1 tablet (100 mg) by mouth daily             thiamine (B-1) 100 MG tablet  Take 1 tablet (100 mg) by mouth daily             traZODone (DESYREL) 50 MG tablet  Take 1-2 tablets ( mg) by mouth nightly as needed for sleep                 Disposition: Lodging plus     Facts about COVID19 at www.cdc.gov/COVID19 and www.[place].gov/covid19     Keeping hands clean is one of the most important steps we can take to avoid getting sick and spreading germs to others.  Please wash your hands frequently and lather with soap for at least 20 seconds!          \"Much or all of the text in this note was generated through the use of Dragon Dictate voice to text software. Errors in spelling or words which appear to be out of contact are " "unintentional, may be present due having escaped editing\"     "

## 2021-07-01 NOTE — PROGRESS NOTES
Pt assessment completed.  [employer] form completed and faxed to [facility].  Pt may transfer when OOD and bed available.

## 2021-07-01 NOTE — PROGRESS NOTES
"[facility]  UNIVERSAL ADULT DIAGNOSTIC ASSESSMENT - Substance Use Disorder    Provider Name and Credentials: Ara Amaral [facility]    PATIENT'S NAME: Martín Shoemaker  PREFERRED NAME: Martín  PRONOUNS: he/him/his     MRN: [id number]  : 1990   Last 4 SSN: [id number]  ACCT. NUMBER:  [id number]  DATE OF SERVICE: 2021   START TIME: 2021 at 9:06 am  END TIME: 2021 at 10:26 am  PREFERRED PHONE: [phone]   May we leave a program related message: Yes  SERVICE MODALITY:  Phone Visit:      Provider verified identity through the following two step process.  Patient provided:  Patient  and Patient's last 4 digits of SSN    The patient has been notified of the following:      \"We have found that certain health care needs can be provided without the need for a face to face visit.  This service lets us provide the care you need with a phone conversation.       I will have full access to your [facility] medical record during this entire phone call.   I will be taking notes for your medical record.      Since this is like an office visit, we will bill your insurance company for this service.       There are potential benefits and risks of telephone visits (e.g. limits to patient confidentiality) that differ from in-person visits.?Confidentiality still applies for telephone services, and nobody will record the visit.  It is important to be in a quiet, private space that is free of distractions (including cell phone or other devices) during the visit.??      If during the course of the call I believe a telephone visit is not appropriate, you will not be charged for this service\"     Consent has been obtained for this service by care team member: Yes       Identifying Information:  Patient is a 30 year old,  male who was referred for an assessment by self. The pronoun use throughout this assessment reflects the patient's chosen pronoun. Patient attended the session alone. " "    Chief Complaint:   The reason for seeking services at this time is: \"alcohol use\"  The problem(s) began at age 24. Patient has attempted to resolve these concerns in the past through CD treatment.  Patient is in active withdrawal, but is currently admitted to [facility] for medical detoxification and withdrawal monitoring and is not an imminent safety risk to self or others, and may proceed with the assessment interview    Social/Family History:  Patient reported he grew up in [place], [place]. Patient was raised by biological parents. Patient reported that his childhood was \"great\". Patient reports he has 1 older sibling and 2 younger siblings. Denies history of childhood abuse or neglect, felt supported  Patient describes current relationships with family of origin as positive.      The patient describes his cultural background as \"white, Zoroastrianism\".  Cultural influences and impact on patient's life structure, values, norms, and healthcare: none identified.  Contextual influences on patient's health include: Individual Factors none identified.  Patient identified his preferred language to be English. Patient reported he does not need the assistance of an  or other support involved in therapy.     Patient reports he is not involved in community of syd activities. Patients reports spirituality impacts his recovery in the following ways: \"it doesn't affect me\".     Patient reported had no significant delays in developmental tasks.  Patient's highest education level was some college. Patient identified the following learning problems: attention and concentration.  Patient reports he is able to understand written materials.    Patient reported the following relationship history \"single, no children\".   Patient identified his sexual orientation as heterosexual.  Patient reported having zero child(caron).     Patient's current living/housing situation involves staying in own home/apartment.  " "Patient lives alone and he reports that housing is stable. Patient identified parents, friends and co-worker as part of his support system.  Patient identified the quality of these relationships as stable and meaningful.      Patient reports engaging in the following recreational/leisure activities: sports, cabin. Patient is currently employed full time and reports they are able to function appropriately at work..  Patient reports his income is obtained through employment.  Patient does identify finances as a current stressor.      Patient reports the following substance related arrests or legal issues: DUI 2.5 years prior.  Patient does report being on probation / parole / under the jurisdiction of the court: [facility].    Patient's Strengths and Limitations:  Patient identified the following strengths or resources that will help him succeed in treatment: \"i'm not sure\". Things that may interfere with the patient's success in treatment include: none identified.     Personal and Family Medical History:   Patient did not report a family history of mental health concerns.  Patient reports the following family history:   Family History   Problem Relation Age of Onset     Hypertension Mother      Cerebrovascular Disease Maternal Grandfather      Cerebrovascular Disease Paternal Grandfather         Patient reported the following previous mental health diagnoses: depression, anxiety.  Patient reports their primary mental health symptoms include: \"I can't sleep, I pace around, and when I'm depressed I drink to feel better and put myself to sleep\" and these do impact his ability to function.   Patient has received mental health services in the past: [facility] for therapy.  Psychiatric Hospitalizations: [facility] in April 2021.  Patient denies a history of civil commitment.  Current mental health services/providers include: no current providers.    GAIN-SS:  GAIN-SS Tool:    No flowsheet data " "found.No flowsheet data found.    When was the last time that you had significant problems...  a. with feeling very trapped, lonely, sad, blue, depressed or hopeless about the future? 1+ years ago  b. with sleep trouble, such as bad dreams, sleeping restlessly, or falling asleep during the day? Past Month  c. with feeling very anxious, nervous, tense, scared, panicked or like something bad was going to happen?  Past Month  d. with becoming very distressed and upset when something reminded you of the past?  Past Month  e. with thinking about ending your life or committing suicide?  Never  When was the last time that you did the following things two or more times?  a. Lied or conned to get things you wanted or to avoid having to do something?   Past Month  b. Had a hard time paying attention at school, work or home? 2 - 12 months ago  c. Had a hard time listening to instructions at school, work or home?  Never  d. Were a bully or threatened other people?  Never  e. Started physical fights with other people?  Never    Patient has had a physical exam to rule out medical causes for current symptoms.  Date of last physical exam was within the past year. Client was encouraged to follow up with PCP if symptoms were to develop. The patient has a [facility] Primary Care Provider, \"I can't remember the name off-hand\".. Patient reports no current medical concerns.  Patient denies any issues with pain..   Patient denies pregnancy, is male. There are not significant appetite / nutritional concerns / weight changes. Patient does  report a history of an eating disorder. Patient does not report a history of head injury / trauma / cognitive impairment.     Patient reports current meds as:   Outpatient Medications Marked as Taking for the 6/28/21 encounter (Hospital Encounter)   Medication Sig     ferrous sulfate (FE TABS) 325 (65 Fe) MG EC tablet Take 1 tablet (325 mg) by mouth daily     folic acid (FOLVITE) 1 MG tablet Take 1 tablet " (1 mg) by mouth daily     hydrOXYzine (ATARAX) 25 MG tablet Take 1-2 tablets (25-50 mg) by mouth 3 times daily as needed for anxiety     multivitamin, therapeutic (THERA-VIT) TABS tablet Take 1 tablet by mouth daily     OLANZapine (ZYPREXA) 10 MG tablet Take 1 tablet (10 mg) by mouth At Bedtime     omeprazole (PRILOSEC) 40 MG DR capsule Take 40 mg by mouth daily     sertraline (ZOLOFT) 100 MG tablet Take 100 mg by mouth daily     thiamine (B-1) 100 MG tablet Take 1 tablet (100 mg) by mouth daily     traZODone (DESYREL) 50 MG tablet Take  mg by mouth nightly as needed for sleep     Current Facility-Administered Medications   Medication     acetaminophen (TYLENOL) tablet 650 mg     alum & mag hydroxide-simethicone (MAALOX) suspension 30 mL     atenolol (TENORMIN) tablet 50 mg     cloNIDine (CATAPRES) tablet 0.1 mg     diazepam (VALIUM) tablet 5-20 mg     ferrous sulfate (FEROSUL) tablet 325 mg     folic acid (FOLVITE) tablet 1 mg     hydrOXYzine (ATARAX) tablet 25-50 mg     loperamide (IMODIUM) capsule 2 mg     multivitamin, therapeutic (THERA-VIT) tablet 1 tablet     nicotine (NICORETTE) gum 2-4 mg     OLANZapine (zyPREXA) tablet 10 mg     ondansetron (ZOFRAN-ODT) ODT tab 4 mg     pantoprazole (PROTONIX) EC tablet 40 mg     senna-docusate (SENOKOT-S/PERICOLACE) 8.6-50 MG per tablet 1 tablet     sertraline (ZOLOFT) tablet 100 mg     thiamine (B-1) tablet 100 mg     traZODone (DESYREL) tablet  mg       Medication Adherence:  Patient reports taking prescribed medications as prescribed.    Patient Allergies:  No Known Allergies    Medical History:    Past Medical History:   Diagnosis Date     Depressive disorder      Hypertension     pt reports he takes meds for HTN     Substance abuse (H)      Ulcer, gastric, acute        Rating Scales:    PHQ9:    PHQ-9 SCORE 4/22/2021   PHQ-9 Total Score 13   ;      Substance Use:  Patient reported the following biological family members or relatives with chemical health  issues: none.  Patient has received substance use disorder and/or gambling treatment in the past.  Patient reports the following dates and locations of treatment services:  [facility], [facility], [facility].  Patient has been to detox.  Patient is not currently receiving any chemical dependency treatment. Patient reports they have attended the following support groups: AA in the past.        Substance Age of first use Pattern and duration of use (include amounts and frequency) Date of last use     Withdrawal potential Route of administration   Has used Alcohol 13 Age 13: First Use  Age 28: problem drinking began, weekends with friends  Age 30: daily, 1 pint/day  Current: past week 1 liter/day 6/29/21 Yes oral   Has used Marijuana   13 Age 13: First Use  Current: Prescribed medically, 1-2 puffs as needed 6/22/21 No smoked     Has not used Amphetamines          Has not used Cocaine/ crack           Has not used Hallucinogens        Has not used Inhalants        Has not used Heroin        Has not used Other Opiates        Has not used Benzodiazepine          Has not used Barbiturates        Has not used Over the counter meds.        Has not used Caffeine        Has used Nicotine  15 Current: 1 pack/week 6/26/21 No smoked   Has not used other substances not listed above:  Identify:              Patient reported the following problems as a result of their substance use: academic performance, money management, relationships, driving under the influence, social interactions, work responsibilities.  Patient is concerned about substance use.     Patient reports experiencing the following withdrawal symptoms within the past 12 months: headache, muscle aches, sensitivity to noise, seizures, hallucinations, fever, psychosis and confused/disrupted speech and the following within the past 30 days: sweating, shaky/jittery/tremors, unable to sleep, agitation, headache, fatigue, sad/depressed feeling, muscle aches,  "vivid/unpleasant dreams, irritability, sensitivity to noise, high blood pressure, nausea/vomiting, dizziness, seizures, diarrhea, diminished appetite, hallucinations, unable to eat, fever, psychosis, confused/disrupted speech and anxiety/worry.   Patients reports urges to use Alcohol.  Patient reports he has used more Alcohol than intended and over a longer period of time than intended. Patient reports he has had unsuccessful attempts to cut down or control use of Alcohol.  Patient reports longest period of abstinence was 90 days and return to use was due to \"I have no idea, I had a good week and started again\". Patient reports he has needed to use more Alcohol to achieve the same effect.  Patient does  report diminished effect with use of same amount of Alcohol.     Patient does  report a great deal of time is spent in activities necessary to obtain, use, or recover from Alcohol effects.  Patient does  report important social, occupational, or recreational activities are given up or reduced because of Alcohol use.  Alcohol use is continued despite knowledge of having a persistent or recurrent physical or psychological problem that is likely to have caused or exacerbated by use.  Patient reports the following problem behaviors while under the influence of substances: none identified. Patient reports his recovery goals are \"stay sober\".     Patient reports substance use has not impacted his ability to function in a school setting. Patient reports substance use has impacted his ability to function in a work setting.  Patients demographics and history impact his recovery in the following ways: none identified.      Patient does not have a history of gambling concerns and/or treatment. Patient does not have other addictive behaviors he is concerned about.       Dimension Scale Ratings:    Dimension 1 -  Acute Intoxication/Withdrawal: 1 - Minor Problem  Dimension 2 - Biomedical: 0 - No Problem  Dimension 3 - " Emotional/Behavioral/Cognitive Conditions: 2 - Moderate Problem  Dimension 4 - Readiness to Change:  1 - Minor Problem  Dimension 5 - Relapse/Continued Use/ Continued Problem Potential: 4 - Extreme Problem  Dimension 6 - Recovery Environment:  3 - Severe Problem    Significant Losses / Trauma / Abuse / Neglect Issues:   Patient did not serve in the .  There are indications or report of significant loss, trauma, abuse or neglect issues related to: are no indications and client denies any losses, trauma, abuse, or neglect concerns.  Concerns for possible neglect are not present.     Safety Assessment:   Current Safety Concerns:  [place] Suicide Severity Rating Scale (Short Version)  [place] Suicide Severity Rating (Short Version) 11/4/2020 4/11/2021 6/28/2021   Over the past 2 weeks have you felt down, depressed, or hopeless? yes no yes   Over the past 2 weeks have you had thoughts of killing yourself? yes no no   Have you ever attempted to kill yourself? no no no   Q1 Wished to be Dead (Past Month) yes no -   Q2 Suicidal Thoughts (Past Month) yes no -   Q3 Suicidal Thought Method yes - -   Q4 Suicidal Intent without Specific Plan no - -   Q5 Suicide Intent with Specific Plan yes - -   Q6 Suicide Behavior (Lifetime) no - -   High Risk Required Interventions On continuous in person observation - -   Required Interventions Provider notified;Room searched;Room made safe;Patient searched;Belongings removed - -   Interventions DEC consulted;Monitored via video - -     Patient denies current homicidal ideation and behaviors.  Patient denies current self-injurious ideation and behaviors.    Patient denied risk behaviors associated with substance use.  Patient denies any high risk behaviors associated with mental health symptoms.  Patient reports the following current concerns for their personal safety: None.  Patient reports there are not  firearms in the house.      History of Safety Concerns:  Patient denied a  history of homicidal ideation.     Patient denied a history of personal safety concerns.    Patient denied a history of assaultive behaviors.    Patient denied a history of sexual assault behaviors.     Patient denied a history of risk behaviors associated with substance use.  Patient denies any history of high risk behaviors associated with mental health symptoms.  Patient reports the following protective factors: positive relationships positive social network and positive family connections    Risk Plan:  See Recommendations for Safety and Risk Management Plan    Review of Symptoms per patient report:  Substance Use:  blackouts, passing out, vomiting, hangovers, daily use, substance related legal problems, work absence due to substance use, family relationship problems due to substance use, social problems related to substance use, driving under the influence and cravings/urges to use     Diagnostic Criteria:  OP BEH CINTHIA CRITERIA: Substance is often taken in larger amounts or over a longer period than was intended.  Met for:  Alcohol, There is persistent desire or unsuccessful efforts to cut down or control use of the substance.  Met for:  Alcohol,  A great deal of time is spent in activities necessary to obtain the substance, use the substance, or recover from its effects.  Met for:  Alcohol, Craving, or a strong desire or urge to use the substance.  Met for:  Alcohol, Recurrent use of the substance resulting in a failure to fulfill major role obligations at work, school, or home.  Met for:  Alcohol, Continued use of the substance despite having persistent or recurrent social or interpersonal problems caused or exacerbated by the effects of its use.  Met for:  Alcohol, Recurrent use of the substance in which it is physically hazardous.  Met for:  Alcohol, Tolerance:  either a need for markedly increased amounts of the substance to achieve the desired effect or a markedly diminished effect with continued use of  the dame amount of the substance.  Met for:  Alcohol, Withdrawal:  either patient endorses characteristic withdrawal syndrome for the substance or the substance (or closely related substance) is taken to relieve or avoid withdrawal symptoms.  Met for:  Alcohol       As evidenced by self report and criteria, client meets the following DSM5 Diagnoses:   Alcohol Use Disorder-Severe  Tobacco Use Disorder-Mild  Marijuana Use Disorder-Moderate    Recommendations:     1. Plan for Safety and Risk Management:   Recommended that patient call 911 or go to the local ED should there be a change in any of these risk factors..            Report to child / adult protection services was NA.     2. Patient's identified substance abuse will be treated with chemical dependency IOP with lodging.     3. Recommendations for treatment focus:   1)  Complete a residential based or similar treatment program similar to [facility].   2)  Abstain from all mood-altering chemicals unless prescribed by a licensed provider.   3)  Attend weekly 12-step support group meetings.     4)  Actively work with a male sponsor or  through [facility] ([phone]).   5)  Follow all the recommendations of your treatment/medical providers.  6)  Remain law abiding and follow all recommendations of the Courts/PO.  7)  Patient may benefit from obtaining a full mental health evaluation.  8)  Patient could benefit from 1:1 psychotherapy due to history of mental health        4.  Mental Health Referrals:   The following referral(s) will be initiated: Outpatient Chemical Health Treatment with [facility] [place]. Next Scheduled Appointment: Admission [id number].    5. CINTHIA Referrals:    Recommendations:  Residential or IOP with Lodging .  Patient reports they are willing to follow these recommendations. Patient does not have a history of opiate use.    6. Records:   These were reviewed at time of assessment.  Information in  this assessment was obtained from the medical record and provided by patient who is a fair historian.  Patient will have open access to their mental health medical record.    DAANES Assessment ID: [id number]  Provider Name/ Credentials:  Ara Amaral [facility]  July 1, 2021

## 2021-07-02 ENCOUNTER — TRANSFERRED RECORDS (OUTPATIENT)
Dept: HEALTH INFORMATION MANAGEMENT | Facility: CLINIC | Age: 31
End: 2021-07-02

## 2021-07-02 ENCOUNTER — HOSPITAL ENCOUNTER (OUTPATIENT)
Dept: BEHAVIORAL HEALTH | Facility: CLINIC | Age: 31
End: 2021-07-02
Attending: FAMILY MEDICINE
Payer: COMMERCIAL

## 2021-07-02 VITALS
RESPIRATION RATE: 16 BRPM | HEART RATE: 66 BPM | OXYGEN SATURATION: 99 % | SYSTOLIC BLOOD PRESSURE: 123 MMHG | DIASTOLIC BLOOD PRESSURE: 79 MMHG | TEMPERATURE: 97.4 F

## 2021-07-02 VITALS — OXYGEN SATURATION: 100 % | TEMPERATURE: 97.7 F

## 2021-07-02 PROBLEM — F19.20 CHEMICAL DEPENDENCY (H): Status: ACTIVE | Noted: 2021-07-02

## 2021-07-02 PROCEDURE — 250N000013 HC RX MED GY IP 250 OP 250 PS 637: Performed by: PSYCHIATRY & NEUROLOGY

## 2021-07-02 PROCEDURE — 1002N00001 HC LODGING PLUS FACILITY CHARGE ADULT

## 2021-07-02 RX ORDER — AMOXICILLIN 250 MG
2 CAPSULE ORAL DAILY PRN
COMMUNITY
End: 2021-07-24

## 2021-07-02 RX ORDER — MAGNESIUM HYDROXIDE/ALUMINUM HYDROXICE/SIMETHICONE 120; 1200; 1200 MG/30ML; MG/30ML; MG/30ML
30 SUSPENSION ORAL EVERY 6 HOURS PRN
COMMUNITY
End: 2021-07-24

## 2021-07-02 RX ORDER — LANOLIN ALCOHOL/MO/W.PET/CERES
3 CREAM (GRAM) TOPICAL
COMMUNITY
End: 2021-07-24

## 2021-07-02 RX ORDER — IBUPROFEN 200 MG
400 TABLET ORAL EVERY 6 HOURS PRN
COMMUNITY
End: 2021-07-24

## 2021-07-02 RX ORDER — LORATADINE 10 MG/1
10 TABLET ORAL DAILY
COMMUNITY
End: 2021-07-24

## 2021-07-02 RX ORDER — ACETAMINOPHEN 325 MG/1
325-650 TABLET ORAL EVERY 4 HOURS PRN
COMMUNITY
End: 2021-07-24

## 2021-07-02 RX ADMIN — FERROUS SULFATE TAB 325 MG (65 MG ELEMENTAL FE) 325 MG: 325 (65 FE) TAB at 09:14

## 2021-07-02 RX ADMIN — SERTRALINE HYDROCHLORIDE 100 MG: 100 TABLET ORAL at 09:14

## 2021-07-02 RX ADMIN — FOLIC ACID 1 MG: 1 TABLET ORAL at 09:14

## 2021-07-02 RX ADMIN — THERA TABS 1 TABLET: TAB at 09:14

## 2021-07-02 RX ADMIN — PANTOPRAZOLE SODIUM 40 MG: 40 TABLET, DELAYED RELEASE ORAL at 09:13

## 2021-07-02 RX ADMIN — THIAMINE HCL TAB 100 MG 100 MG: 100 TAB at 09:13

## 2021-07-02 NOTE — PROGRESS NOTES
Name: Martín Shoemaker  Date: 7/2/2021  Medical Record: [id number]  Envelope Number: [id number]  List of Contents (List each item separately in new row):   Vitamin B-1 100 mg Tab,  Sertraline 100mg Tab,  Hydroxyzine HCL 25mg Tab,  Olanzapine 10mg Tabs, Pantoprazole 40mg Tabs,  Trazodone 50mg Tabs,  Folic Acid 1mg Tabs.  Admission:  I am responsible for any personal items that are not sent to the safe or pharmacy.  [facility] is not responsible for loss, theft or damage of any property in my possession.    Patient Signature:  ___________________________________________       Date/Time:__________________________    Staff Signature: __________________________________       Date/Time:__________________________    [facility] Staff person, if patient is unable/unwilling to sign:      __________________________________________________________       Date/Time: __________________________    Discharge:  [facility] has returned all of my personal belongings:    Patient Signature: ________________________________________     Date/Time: ____________________________________    Staff Signature: ______________________________________     Date/Time:_____________________________________

## 2021-07-02 NOTE — PLAN OF CARE
Problem: Adult Inpatient Plan of Care  Goal: Optimal Comfort and Wellbeing  Outcome: No Change    Behavioral  Pt appeared sleeping comfortably overnight; breathing was even and unlabored.      Medical  Pt continues in alcohol withdrawal; MSSA  2, no valium given this shift; pt has required 90 mg of valium since admission to detox; Pt last valium on 7/1/21 @ 0853    No new medical concerns noted.

## 2021-07-02 NOTE — PROGRESS NOTES
Initial Services Plan        Before your first treatment group, please do the following    Immediate health & safety concerns: Go to the emergency room if you start to have withdrawal symptoms.  Look for a support network (such as AA, NA, DBT group, a Worship group, etc.)    Suggestions for client during the time between intake & completion of treatment plan:  Tour your treatment center (unit or outpatient clinic).  Introduce yourself to the treatment group.  Spend time getting to know your peers.  Complete the problem list for your treatment plan.  Review your patient or client handbook.    Client issues to be addressed in the first treatment sessions:  Identify motivations(s) for coming to treatment, i.e. legal, family, job, self  Identify concerns about coming into treatment, i.e. fear of failing again, sharing a room in treatment  Identify concerns about going to group, i.e. fear of talking in group  Identify outside concerns that may interfere with treatment, i.e. bills not getting paid, homesick for children      Eladio Markahm [facility]  7/2/2021  12:56 PM

## 2021-07-02 NOTE — PROGRESS NOTES
Name: Martín Shoemaker  Date: 7/2/2021  Medical Record: [id number]  Envelope Number: [id number]  List of Contents (List each item separately in new row):   One Cell Phone (Cracked screen)  Admission:  I am responsible for any personal items that are not sent to the safe or pharmacy.  [facility] is not responsible for loss, theft or damage of any property in my possession.    Patient Signature:  ___________________________________________       Date/Time:__________________________    Staff Signature: __________________________________       Date/Time:__________________________    2nd Staff person, if patient is unable/unwilling to sign:      __________________________________________________________       Date/Time: __________________________    Discharge:  [facility] has returned all of my personal belongings:    Patient Signature: ________________________________________     Date/Time: ____________________________________    Staff Signature: ______________________________________     Date/Time:_____________________________________

## 2021-07-02 NOTE — PROGRESS NOTES
Progress Note    This patient had a Rule 25 Assessment on 7/1/2021 completed by Suzanne Amaral LPC, [facility].  This patient was seen for a face to face update of the Admission Update on 7/2/2021 by Eladio Marhkam [facility].  INSIDE: The patient's Rule 25 Assessment completed on 7/1/2021 is in the patient's electronic medical record in Epic in the Chart Review section under the Notes/Trans Tab.    Alcohol/Drug use since the last CD evaluation (include date of last use):     No additional substances use since the last CD evaluation     Please note any other clinical changes since the last CD evaluation (such as medication changes, additional legal charges, detoxification admissions, overdoses, etc.)     No significant changes since the last CD evaluation       ASAM Dimensions Original scores Current Scores   I.) Intoxication and Withdrawal: 1 1   II.) Biomedical:  0 0   III.) Emotional and Behavioral:  2 2   IV.) Readiness to Change:  1 1   V.) Relapse Potential: 4 4   VI.) Recovery Environmental: 3 3     Please list clinical justifications for the above ASAM score changes since the original comprehensive assessment:     None of the ASAM scores on the six dimensions had changed since the Rule 25 Assessment was completed on 7/1/21.       Current LEDY: Current UA:     No LEDY as the patient was a direct transfer from 3 A IP detoxification unit at [facility] in [place], [place].     No UA screen as the patient was a direct transfer from 3 A IP detoxification unit at [facility] in [place], [place].        PHQ-9, JOSE-7   PHQ-9 on 7/2/2021 JOSE-7 on 7/2/2021   The patient's PHQ-9 score was 12 out of 27, indicating moderate depression.   The patient's JOSE-7 score was 15 out of 21, indicating severe anxiety.       [place]-Suicide Severity Rating Scale Reassessment   Have you ever wished you were dead or that you could go to sleep and not wake up?  Past Month:  No     Have you actually had any thoughts of killing  yourself?  Past Month:  No     Have you been thinking about how you might do this?     Past Month:  No   Lifetime:  No   Have you had these thoughts and had some intention of acting on them?     Past Month:  No   Lifetime:  No   Have you started to work out the details of how to kill yourself?   Past Month:  No   Lifetime:  No   Do you intend to carry out this plan?   No     When you have the thoughts how long do they last?  The patient denied ever having any suicidal thoughts in life.     Are there things - anyone or anything (i.e. family, Methodist, pain of death) that stopped you from wanting to die or acting on thoughts of suicide?  Does not apply       2008  [facility]  Used with permission by Alba Ortega, PhD.       Guide to C-SSRS Risk Ratings   NO IDEATION:  with no active thoughts IDEATION: with a wish to die. IDEATION: with active thoughts. Risk Ratings   If Yes No No 0 - Very Low Risk   If NA Yes No 1 - Low Risk   If NA Yes Yes 2 - Low/moderate risk   IDEATION: associated thoughts of methods without intent or plan INTENT: Intent to follow through on suicide PLAN: Plan to follow through on suicide Risk Ratings cont...   If Yes No No 3 - Moderate Risk   If Yes Yes No 4 - High Risk   If Yes Yes Yes 5 - High Risk   The patient's ADDITIONAL RISK FACTORS and lack of PROTECTIVE FACTORS may increase their overall suicide risk ratings.     Additional Risk Factors:    No additional risk factors   Protective Factors:    Having people in his/her life that would prevent the patient from considering a suicide attempt (i.e. young children, spouse, parents, etc.)     An absence of chronic health problems or stable and well treated chronic health issues     A positive relationship with his/her clinical medical and/or mental health providers     Having easy access to supportive family members     Having a good community support network     Having cultural, Hoahaoism or spiritual  "beliefs that discourage suicide     Risk Status   1. - Low Risk: Evaluation Counselors:  Document in Epic / SBAR to counselor \"Low Risk\".      Treatment Counselors:  Reassess upon admission as applicable, assess weekly in progress notes under Dimension 3 and summarize in Discharge / Treatment summary under Dimension 3.     Additional information to support suicide risk rating: There was no additional information to provide at this time.     "

## 2021-07-02 NOTE — PLAN OF CARE
Pt discharge to [facility] for treatment. AVS and labs reviewed with patient, all questions answered and copies sent with patient. Pt discharged with all medications and belongings.

## 2021-07-02 NOTE — PROGRESS NOTES
Name: Martín Shoemaker  Date: 7/2/2021  Medical Record: [id number]    Envelope Number: [id number]    List of Contents (List each item separately in new row):   1 Bottle Disulfiram 250 mg tablets    Admission:  I am responsible for any personal items that are not sent to the safe or pharmacy.  [facility] is not responsible for loss, theft or damage of any property in my possession.      Patient Signature:  ___________________________________________       Date/Time:__________________________    Staff Signature: __________________________________       Date/Time:__________________________    2nd Staff person, if patient is unable/unwilling to sign:      __________________________________________________________       Date/Time: __________________________      Discharge:  [facility] has returned all of my personal belongings:    Patient Signature: ________________________________________     Date/Time: ____________________________________    Staff Signature: ______________________________________     Date/Time:_____________________________________

## 2021-07-02 NOTE — DISCHARGE INSTRUCTIONS
Behavioral Discharge Planning and Instructions  THANK YOU FOR CHOOSING THE [facility]  3A  [phone]    Summary: You were admitted to [facility] on 6/29/21 for detoxification from alcohol.  A medical exam was performed that included lab work. You have met with a  and opted to enter [facility]  Please take care and make your recovery a priority, Martín!      Main Diagnosis: Per Dr. Reyna Mcdaniels MD;  303.90 (F10.20) Alcohol Use Disorder Severe      Major Treatments, Procedures and Findings:  You were detoxed from alcohol with the Modified Selective Severity Protocol using Valium. You have met with a  to develop a treatment plan for discharge.  You have had labs drawn and a copy of those labs will be sent home with you.  Please bring your lab results with to your follow up doctor appointment.    Symptoms to Report:  If you experience more anxiety, confusion, sleeplessness, deep sadness or thoughts of suicide, notify your treatment team or notify your primary care physician. IF ANY OF THE SYMPTOMS YOU ARE EXPERIENCING ARE A MEDICAL EMERGENCY CALL 911 IMMEDIATELY.     Lifestyle Adjustment: Adjust your lifestyle to get enough sleep, relaxation, exercise and  good nutrition. Continue to develop healthy coping skills to decrease stress and promote a sober living environment. Do not use alcohol, illegal drugs or addictive medications other than what is currently prescribed. AA, NA, and  Sponsor are excellent resources for support.     Primary Provider:    Disposition: [facility]      Facts about COVID19 at www.cdc.gov/COVID19 and www.[place].gov/covid19    Keeping hands clean is one of the most important steps we can take to avoid getting sick and spreading germs to others.  Please wash your hands frequently and lather with soap for at least 20 seconds!    Medical Follow-Up:  Please follow up with your primary care provider within 30 days of discharge.  "    Resources:     Resources for on line recovery meetings:      *due to covid-19 AA/NA meetings are being held online*      AA meetings can be found online; search for them at: http://aa-intergroup.org/directory.php  AA meetings via [place] for [place] area can be found online at: https://a[place].org/find-a-meeting/holiday-closings/  NA meetings via [place] for [place] area can be found online at: https://sites.[employer].com/view/[place]regionofnarcoticsanonymous/home?authuser=2    Www.[employer]  has online resources for meeting and recovery care including Podcast \"Let's Talk:Addiction & Recovery Podcasts    Www.[place]recovery.org     DISCHARGE RESOURCES:  -SMART Recovery - self management for addiction recovery:  www.smartrecblogfoster.org    -Pathways ~ A Health Crisis Resource & Support Center: 126.150.1867.  -[facility] 398-880-7982   -[facility] Behavioral Intake 160-887-3841 or 441-518-7016.  -Suicide Awareness Voices of Education (SAVE) (www.save.org): 706-025-SLXJ (9088)  -National Suicide Prevention Line (www.mentalhealthmn.org): 563-172-RWWK (4990)  -National [place] on Mental Illness (www.[place].chasity.org): 447.127.4161 or 071-816-5794.  -Igxo0soqh: text the word LIFE to 38720 for immediate support and crisis intervention  -Mental Health Consumer/Survivor Network of [place] (www.mhcsn.net): 202.145.5890 or 204-278-8654  -Mental Health Association of [place] (www.mentalhealth.org): 561.439.4308 or 784-927-3363     -Substance Abuse and Mental Health Services (www.samhsa.gov)  -Harm Reduction Coalition (www. Harmreduction.org)  -www.prescribetoprevent.org or http://prescribetoprevent.org/video  -Poison control 4-224-085-7831   **[place] Opioid Prevention Coalition: www.opioidcoalition.org    Sober Support Group Information:  AA/NA & Sponsor/Support  -Alcoholics Anonymous (www.alcoholics-anonymous.org): for local information 24 hours/day  -AA Intergroup service office in [place] (http://www.aastpaul.org/) " 559.520.5133  -AA Intergroup service office in [facility]: 585.919.1721. (http://www.[place].org/)  -Narcotics Anonymous (www.nam[place].org) (562) 321-4816   **Sober Fun Activities: www.soberAnybotsactivities.[employer]/[facility]//[facility] Recovery Connection ([facility])  [facility] connects people seeking recovery to resources that help foster and sustain long-term recovery.  Whether you are seeking resources for treatment, transportation, housing, job training, education, health care or other pathways to recovery, [facility] is a great place to start.    Phone: 789.267.4463.  www.[employer] (Great listing of all types of recovery and non-recovery related resources)      General Medication Instructions:   See your medication sheet(s) for instructions.   Take all medicines as directed.  Make no changes unless your doctor suggests them.   Go to all your doctor visits.  Be sure to have all your required lab tests. This way, your medicines can be refilled on time.  Do not use any drugs not prescribed by your provider.  AA/NA and Sponsors are excellent resources for support  Avoid alcohol.    Any follow up concerns:  Nursing questions call the Unit -[facility] [phone]  Medical Record call [phone]  Outpatient Behavioral Intake call [phone]  [facility] Wait List/Bed Availability call [phone]    The entire treatment team has appreciated the opportunity to work with you Peter.  We wish you the best in the future and with your lifelong recovery goals. Please bring this discharge folder with you to all follow up appointments.  It contains your lab results, diagnosis, medication list and discharge recommendations.    THANK YOU FOR CHOOSING THE [facility]

## 2021-07-02 NOTE — PROGRESS NOTES
"[facility] Nursing Health Assessment      Vital signs:     There were no vitals taken for this visit.      Transfer from     Counselor: Henrietta   Drug of Choice: ETOH,   Last use: 6/28/21  Home clinic/MD: None.   Psychiatrist/therapist: None--Last time here: April and Koki MATHUR-Prescribed the med for him    Medical history/current conditions:  None    H&P Screen:  H&P within the last 90 days: No.  Patient has been informed they are required to obtain an H&P within the next 14 days.        Mental Health diagnosis: Depression and anxiety  Medication compliant?: Yes when he is sober  Recent sucidal thoughts? NO     When? N/A  Current thought of self-harm? NO    Plan? N/A    Pain assessment:   Pt. Experiencing pain at this time?  No    [facility] Medical Screen for COVID19    In the last 2 weeks have you been in an large group gatherings (more than 10 people)? NO    Have you been covering your nose and mouth while out in the community? Yes    Have you come in contact with anyone in the last month who \"was suspected of\" or \"tested positive\" for COVID-19? no    Have you received COVID Vaccination? No    Have you tested positive for COVID-19 in the past 90 days? NO  -If yes pt should not be re-tested until 90 days from day one of symptom onset   UNLESS patient is COVID symptomatic, contact infection prevention for recommendation    \"Do you\" or \"have you\" had....any of the following symptoms in the last 2 weeks? NO      Fever or chills     Cough     Shortness of breath or difficulty breathing     New muscle or body aches    New headache     New loss of taste or smell    Sore throat     Congestion or runny nose     New and unexplained Nausea or vomiting     Diarrhea    New and unexplained fatigue    Does the above COVID screen need to be reviewed by Infection Prevention? Alberto HANDY    COVID TEST COMPLETED BY [facility] ? DETOX-3A    COVID-19 - Pt informed of the following while at LP:    1)Staff will take temperature " and O2Sats once daily    2) Practice good hand washing hygiene and avoid touching face    3) If pt has any of the symptoms below, notify staff immediately.      Fever     Cough     Shortness of breath or difficulty breathing     Chills     Repeated shaking with chills    Muscle pain     4) COVID testing maybe initiated at admission. Depending on the situation amd symptoms patients may be tested more than once during their stay.  Patient will be required to stay in room until lab results confirm negative. If you are unable to stay in your room you may be asked to leave the program.  If COVID results are positive, You will have to exit the program quarantine as recommended per CDC and then may return for CD treatment after symptoms have resolved    5) Per COVID protocol, during your stay at , social distancing is required AND mouth and nose must be covered at all times with facial mask while out in milieu.      6) Patients will not be allowed to go to any outside appointments, all outside appointments will need to be virtual or by phone    RN Assessment of Patient's Ability to Safely Manage and Self-Administer Respiratory Treatments    Has experience in the management of Respiratory (If NA, indicate and move to Integrative Therapies): N/A      Integrative Therapies: Essential Oils    Patient requesting essential oil inhaler to manage (Mood/Mental Health/Physical/Spiritual symptoms).     Discussed appropriate use of essential oil inhalers and instructed patient not to leave labeled product out on unit.     Patient was screened for kidney disease, asthma/reactive airway disease and rashes and wounds or 1st trimester of pregnancy    List Essential Oils requested by pt LAVENDER    Patient verbalized and demonstrated understanding of how to use essential oil inhaler correctly and will notify LP RN with any concerns or side effects. Patient agrees not to share their essential oil inhaler with other clients.  Continue to  support the patient in safely utilizing integrative therapies as able to manage symptoms during treatment.       Patient tobacco use:    Do you use tobacco? YES  Type?  CIGGARETES   How often? DAILY  How much? 1-2 PPD   Are you interested in quitting? Contemplating    NRT (Nicotine Replacement therapy) ordered? CONTEMPLATING   Pt is aware of the dangers of tobacco cessation and in contemplation.    Pt given written education.          Nutritional Assessment:    Have you ever purged, binged or restricted yourself as a way to control your weight?   No     Are you on a special diet?   No     Do you have any concerns regarding your nutritional status?   No     Have you had any appetite changes in the last 3 months?   No   Have you had weight loss or weight gain of more than 10 lbs in the last 3 months?   If patient gained or lost more than 10 lbs, then refer to program RN / attending Physician for assessment.   No   Was the patient informed of BMI?  Wt 190 HT 6'0  Normal, No Intervention   No   Have you engaged in any risk-taking behavior that would put you at risk for exposure to blood-borne or sexually transmitted diseases?   No   Do you have any dental problems?   No           Nursing Assessment Summary:  Pt appropriate candidate for [facility]. Has been here--last time was April, 2021    On-going nursing intervention required?   No    Acute care visit recommended: no   Completed by: Alberto HANDY

## 2021-07-02 NOTE — PLAN OF CARE
Pt monitored for alcohol withdrawal, MSSA of 2. Pt was not medicated this shift.    Pt was isolative to room for majority of shift. Pt administered HS zyprexa and trazodone.     Pt is scheduled to discharge to [facility] on Friday 7/2

## 2021-07-02 NOTE — PROGRESS NOTES
Comprehensive Assessment Summary     Based on client interview, review of previous assessments and   comprehensive assessment interview the following diagnosis and recommendations are:     Patient: Martín Shoemaker  MRN; [id number]   : 1990  Age: 30 year old Sex: male       Client meets criteria for:  Alcohol Use Disorder-Severe, F10.20  Tobacco Use Disorder-Mild, Z72.00  Marijuana Use Disorder-Moderate, F12.20    Dimension One: Acute Intoxication/Withdrawal Potential     Ratin  (Consider the client's ability to cope with withdrawal symptoms and current state of intoxication)   Patient reports his last use date for alcohol as , last use date for cannabis as 21.     Dimension Two: Biomedical Condition and Complications    Ratin  (Consider the degree to which any physical disorder would interfere with treatment for substance abuse, and the client's ability to tolerate any related discomfort; determine the impact of continued chemical use on the unborn child if the client is pregnant)   Patient denies any current biomedical conditions that would interfere with CINTHIA treatment.     Dimension Three: Emotional/Behavioral/Cognitive Conditions & Complications  Ratin  (Determine the degree to which any condition or complications are likely to interfere with treatment for substance abuse or with functioning in significant life areas and the likelihood of risk of harm to self or others) Patient reports past diagnoses of anxiety and depression. Patient has received mh therapy in the past. Patient lacks impulse control.     Dimension Four: Treatment Acceptance/Resistance     Ratin  (Consider the amount of support and encouragement necessary to keep the client involved in treatment)   Patient appears motivated with active reinforcement. Patient appears to be in the contemplation Stage of Change.    Dimension Five: Continued Use/Relaspe Prevention     Ratin  (Consider the degree to which  the client's recognizes relapse issues and has the skills to prevent relapse of either substance use or mental health problems)   Patient has a history of previous CINTHIA treatments and subsequent relapse. Patient needs to identify his relapse triggers/warning signs and ways they can cope and/or avoid them in recovery.     Dimension Six: Recovery Environment     Rating:   3  (Consider the degree to which key areas of the client's life are supportive of or antagonistic to treatment participation and recovery)   Patient reports being single with no children. He lives alone and is employed. Patient would benefit from building and using a sober support network. Patient has a past DUI but is currently not on probation.     I have reviewed the information on the assessment, psychosocial and medical history and checklist:        it is current

## 2021-07-03 ENCOUNTER — HOSPITAL ENCOUNTER (OUTPATIENT)
Dept: BEHAVIORAL HEALTH | Facility: CLINIC | Age: 31
End: 2021-07-03
Attending: FAMILY MEDICINE
Payer: COMMERCIAL

## 2021-07-03 VITALS — OXYGEN SATURATION: 99 % | TEMPERATURE: 98.1 F

## 2021-07-03 PROCEDURE — H2035 A/D TX PROGRAM, PER HOUR: HCPCS | Mod: HQ

## 2021-07-03 PROCEDURE — 1002N00001 HC LODGING PLUS FACILITY CHARGE ADULT

## 2021-07-03 NOTE — GROUP NOTE
Psychoeducation Group Documentation    PATIENT'S NAME: Martín Shoemaker  MRN:   [id number]  :   1990  ACCT. NUMBER: [id number]  DATE OF SERVICE: 21  START TIME: 12:30 PM  END TIME:  2:30 PM  FACILITATOR(S): Kaylie Quintanilla ADC-T; Isaias Talbert LADC  TOPIC: BEH Pyschoeducation  Number of patients attending the group:  8  Group Length:  2 Hours    Skills Group Therapy Type: Recovery skills    Summary of Group / Topics Discussed:    Relapse prevention skills and spirituality    Group Attendance:  Attended group session    Patient's response to the group topic/interactions:  cooperative with task    Patient appeared to be Attentive and Engaged.         Client specific details:  Pt was attentive and respectful during a lecture on spirituality/identity as relapse prevention.

## 2021-07-03 NOTE — GROUP NOTE
Group Therapy Documentation    PATIENT'S NAME: Martín Shoemaker  MRN:   [id number]  :   1990  [id number]  DATE OF SERVICE: 21  START TIME:  9:00 AM  END TIME: 11:00 AM  FACILITATOR(S): Magno Bueno LADC; Kaylie Quintanilla LADC  TOPIC: BEH Group Therapy  Number of patients attending the group:  27  Group Length:  2 Hours    Group Therapy Type: Recovery strategies    Summary of Group / Topics Discussed:    Relapse prevention      Group Attendance:  Attended group session    Patient's response to the group topic/interactions:  cooperative with task    Patient appeared to be Actively participating.        Client specific details:  The session was a workshop on relapse prevention. Patients engaged exercises to identify their personal relapse triggers and warning signs. They shared these with each other to discuss and discover other triggers and/or warning signs they may have. Pts also wrote a relapse scenario and a prevention plan for how they may respond in the future.

## 2021-07-04 ENCOUNTER — HOSPITAL ENCOUNTER (OUTPATIENT)
Dept: BEHAVIORAL HEALTH | Facility: CLINIC | Age: 31
End: 2021-07-04
Attending: FAMILY MEDICINE
Payer: COMMERCIAL

## 2021-07-04 VITALS — TEMPERATURE: 98.1 F | OXYGEN SATURATION: 98 %

## 2021-07-04 PROCEDURE — 1002N00001 HC LODGING PLUS FACILITY CHARGE ADULT

## 2021-07-04 PROCEDURE — H2035 A/D TX PROGRAM, PER HOUR: HCPCS | Mod: HQ

## 2021-07-04 NOTE — GROUP NOTE
Psychoeducation Group Documentation    PATIENT'S NAME: Martín Shoemaker  MRN:   [id number]  :   1990  ACCT. NUMBER: [id number]  DATE OF SERVICE: 21  START TIME:  9:00 AM  END TIME: 11:00 AM  FACILITATOR(S): Kaylie Quintanilla ADC-T; Cecelia Javed RN; Isaias Talbert LADC  TOPIC: BEH Pyschoeducation  Number of patients attending the group:  25  Group Length:  2 Hours    Skills Group Therapy Type: Recovery skills and Healthy behaviors development    Summary of Group / Topics Discussed:    Balanced lifestyle skills, Relapse prevention skills, and STD/pregnancy and substance use    Group Attendance:  Attended group session    Patient's response to the group topic/interactions:  cooperative with task    Patient appeared to be Attentive and Engaged.         Client specific details: Pt listened respectfully to a lecture on STIs/pregnancy and substance use, and participated in an activity illustrating the ease of STI transmission and combatting the stigma therefrom.

## 2021-07-04 NOTE — GROUP NOTE
Psychoeducation Group Documentation    PATIENT'S NAME: Martín Shoemaker  MRN:   [id number]  :   1990  ACCT. NUMBER: [id number]  DATE OF SERVICE: 21  START TIME: 12:30 PM  END TIME:  1:30 PM  FACILITATOR(S): Isaias Talbert LADC; Kaylie Quintanilla ADC-T  TOPIC: BEH Pyschoeducation  Number of patients attending the group:  25  Group Length:  1 Hours    Skills Group Therapy Type: Healthy behaviors development and Relationship skills development    Summary of Group / Topics Discussed:    Relationship/social skills, Relapse prevention skills, and sexual boundaries    Group Attendance:  Attended group session    Patient's response to the group topic/interactions:  cooperative with task    Patient appeared to be Attentive and Engaged.         Client specific details:  Pt listened respectfully to a lecture on sexual boundaries and asked appropriate questions.

## 2021-07-05 ENCOUNTER — HOSPITAL ENCOUNTER (OUTPATIENT)
Dept: BEHAVIORAL HEALTH | Facility: CLINIC | Age: 31
End: 2021-07-05
Attending: FAMILY MEDICINE
Payer: COMMERCIAL

## 2021-07-05 VITALS — TEMPERATURE: 97.9 F | OXYGEN SATURATION: 98 %

## 2021-07-05 PROCEDURE — H2035 A/D TX PROGRAM, PER HOUR: HCPCS | Mod: HQ

## 2021-07-05 PROCEDURE — 1002N00001 HC LODGING PLUS FACILITY CHARGE ADULT

## 2021-07-05 NOTE — GROUP NOTE
Group Therapy Documentation    PATIENT'S NAME: Martín Shoemaker  MRN:   [id number]  :   1990  [id number]  DATE OF SERVICE: 21  START TIME:  9:00 AM  END TIME: 11:00 AM  FACILITATOR(S): Cole De La Torre LADC  TOPIC: BEH Group Therapy  Number of patients attending the group:  8  Group Length:  2 Hours    Group Therapy Type: Recovery strategies    Summary of Group / Topics Discussed:    Recovery Principles      Group Attendance:  Attended group session    Patient's response to the group topic/interactions:  cooperative with task    Patient appeared to be Attentive.        Client specific details:  Martín attended AM group. He took part in a discussion on acceptance and putting his recovery first. Patient also checked in, and gave a peer feedback on their assignment.

## 2021-07-05 NOTE — GROUP NOTE
Group Therapy Documentation    PATIENT'S NAME: Martín Shoemaker  MRN:   [id number]  [facility]   1990  [id number]  DATE OF SERVICE: 21  START TIME: 12:30 AM  END TIME:  2:30 PM  FACILITATOR(S): Cole De La Torre LADC  TOPIC: BEH Group Therapy  Number of patients attending the group:  8  Group Length:  2 Hours    Group Therapy Type: Recovery strategies    Summary of Group / Topics Discussed:    Spiritual Care      Group Attendance:  Attended group session    Patient's response to the group topic/interactions:  cooperative with task    Patient appeared to be Attentive.        Client specific details:  Sixto  attended Spiritual Care afternoon group. The discussion included the importance of sober support including for some, a higher power.

## 2021-07-06 ENCOUNTER — HOSPITAL ENCOUNTER (OUTPATIENT)
Dept: BEHAVIORAL HEALTH | Facility: CLINIC | Age: 31
End: 2021-07-06
Attending: FAMILY MEDICINE
Payer: COMMERCIAL

## 2021-07-06 VITALS — TEMPERATURE: 97.1 F | OXYGEN SATURATION: 98 %

## 2021-07-06 PROCEDURE — 1002N00001 HC LODGING PLUS FACILITY CHARGE ADULT

## 2021-07-06 PROCEDURE — H2035 A/D TX PROGRAM, PER HOUR: HCPCS | Mod: HQ

## 2021-07-06 PROCEDURE — H2035 A/D TX PROGRAM, PER HOUR: HCPCS

## 2021-07-06 NOTE — PROGRESS NOTES
Patient:   Martín Shoemaker     Date:  7/6/21     Comprehensive Assessment UPDATE         Comprehensive Summary Update and Review  Counselor met with patient on 7/6/21 and reviewed the Comprehensive Assessment.    There were no changes/updates identified by patient or in chart entries.

## 2021-07-06 NOTE — GROUP NOTE
Group Therapy Documentation    PATIENT'S NAME: Martín Shoemaker  MRN:   [id number]  :   1990  [facility] NUMBER: [id number]  DATE OF SERVICE: 21  START TIME:  9:00 AM  END TIME: 11:00 AM  FACILITATOR(S): Cole De La Torre LADC  TOPIC: BEH Group Therapy  Number of patients attending the group:  6  Group Length:  2 Hours    Group Therapy Type: Recovery strategies    Summary of Group / Topics Discussed:    Recovery Principles      Group Attendance:  Attended group session    Patient's response to the group topic/interactions:  cooperative with task    Patient appeared to be Attentive.        Client specific details:  Martín attended AM group. Patient took part in a group discussion on boredom and how they have and will cope with it. Patient also checked in, talked about activities they associate with use.

## 2021-07-06 NOTE — PROGRESS NOTES
Patient:  Martín Shoemaker        Day Monday Tuesday Wednesday Thursday Friday Saturday Janusz   Group Hours   4 hours 4 hours 5 hours 4 hours 4 hours 4 hours 3 hours   Skills Hours   0 hours 1.0 hours 0 hours 1.0 hours 0 hours 0 hours 0 hours   Individual Session (LADC)    0 hours 0 hours 0 hours 0 hours 0 hours 0 hours 0 hours   Individual Session  (psychotherapy)   0 hours 0 hours 0 hours 0 hours 0 hours 0 hours 0 hours   Peer-led Recovery Group   1.0 hours 1.0 hours 1.0 hours 1.0 hours 1.0 hours 1.0 hours 1.0 hours                 Adult CD Progress Note and Treatment Plan Review     Attendance  Please refer to OP BEH CD Adult Attendance Record Documentation Flowsheet    Support group attended this week: yes    Reporting sobriety:  yes    Treatment Plan     Treatment Plan Review competed on: 7/6/21       Client preferred learning style: Visual  Hands on  Verbal    Staff Members contributing: ANTONELLA Watson, ANTONELLA Parikh                       Received Supervision: No    Client: contributed to goals and plan.    Client received copy of plan/revised plan: Yes    Client agrees with plan/revised plan: Yes    Changes to Treatment Plan: No    New Goals added since last review : current goals    Goals worked on since last review: relapse prevention    Strategies effective: yes    Strategies need these changes: continue with treatment process, attend all treatment programming    1) Care Coordination Activities: Pt is beginning to look at aftercare options.    2) Medical, Mental Health, and other appointments the client attended: Pt reported no appointments this past week.  3) Medication issues: Pt reported no concerns at this time.  4) Physical and mental health problems: Pt reported no concerns at this time.  5) Any changes in Vulnerable Adult Status? No. If yes, add to treatment plan and individual abuse prevention plan.  6) Review and evaluation of the individual abuse prevention plan: Current IAPP for this  "program is adequate for this client.  ASAM Risk Rating:    Dimension 1 0 Patient reports his last use date for alcohol as 6/27, last use date for cannabis as 6/22/21.  Patient does report some withdrawal symptoms such as \"sweaty, cold & hot\", but not anything that would interfere with treatment at this time.     Dimension 2 0 Patient denies any current biomedical conditions that would interfere with CINTHIA treatment. Pt did not attend any healthcare appointments and denied scheduling any appointments for the future.    Dimension 3 2 Patient reports past diagnoses of anxiety and depression. Pt denied having suicidal thoughts at this time. Pt reported some  changes in his mood, ie: \"feel better, I'm getting right again\" and reports some changes in his  stress level this past week related to \"financial stress\". Pt reported using \"talking to my peers\" as his coping techniques for dealing with difficult emotions this past week. Patient attended a Spiritual Care group session facilitated by Bita Burkett.    Dimension 4 1  Patient appears motivated with active reinforcement. Patient appears to be in the contemplation Stage of Change. Pt is active in group process, accepts and provides feedback, has good insight, and appears engaged. Pt is supportive of his  group peers. Pt reported that being \"sick and tired of it\" is what motivated him to be sober and to stay in treatment this week.    Dimension 5 4 Patient has a history of previous CINTHIA treatments and subsequent relapse. Patient needs to identify his relapse triggers/warning signs and ways they can cope and/or avoid them in recovery.   Pt denied any cravings this past week. Pt reported that \"golf, fishing, staying at my parents\" have been his past coping skills he used to manage cravings this past week. Patient attended the Relapse prevention weekend workshop and learned/reviewed their relapse triggers.Patient attended the Relapse prevention weekend workshop and " learned/reviewed their relapse triggers.    Dimension 6 3 Patient reports being single with no children. He lives alone and is employed.  Pt denies current legal involvement. Pt lacks a sober support network. Pt has been attending sober support meetings while in treatment.      Guide to C-SSRS Risk Ratings   NO IDEATION:  with no active thoughts IDEATION: with a wish to die. IDEATION: with active thoughts. Risk Ratings   If Yes No No 0 - Very Low Risk   If NA Yes No 1 - Low Risk   If NA Yes Yes 2 - Low/moderate risk   IDEATION: associated thoughts of methods without intent or plan INTENT: Intent to follow through on suicide PLAN: Plan to follow through on suicide Risk Ratings cont...   If Yes No No 3 - Moderate Risk   If Yes Yes No 4 - High Risk   If Yes Yes Yes 5 - High Risk   The patient's ADDITIONAL RISK FACTORS and lack of PROTECTIVE FACTORS may increase their overall suicide risk ratings.      Additional Risk Factors:    Someone close to the patient (family member/friend) completed a suicide     Significant history of having untreated or poorly treated mental health symptoms     Significant history of untreated or poorly treated chronic pain issues     Tendency to be socially isolated and/or cut off from the support of others     A recent death of someone close to the patient and/or unresolved grief and loss issues     A recent loss that was significant to the patient, i.e. loss of job, loss of home, divorce, break-up, etc.     Significant history of trauma and/or abuse issues     A triggering event(s) leading to humiliation, shame or despair     History of impulsive or aggressive behaviors   Protective Factors:    Having people in his/her life that would prevent the patient from considering a suicide attempt (i.e. young children, spouse, parents, etc.)     Having cultural, Restorationist or spiritual beliefs that discourage suicide     Having restricted access to highly lethal means of suicide       Patient's  current risk rating:  Past 24 hours: 0. - Very Low     Any changes in Vulnerable Adult Status?  No  If yes, add to treatment plan and individual abuse prevention plan.    Family Involvement:   Mom and Dad    Data:   offered feedback client did participate       Intervention:   Aftercare planning  Behavior modification  Cognitive Behavioral Therapy  Counselor feedback  Education  Emotional management  Group feedback  Motivational Enhancement Therapy  Relapse prevention  Twelve Step facilitation  Client & counselor reviewed and signed ISP & assessment summary  Mental health education       Assessment:   Stages of Change Model  Contemplation    Appears/Sounds:  Cooperative       Plan:  Monitor emotional/physical health  Begin working on treatment plan assignments    ANTONELLA Watson

## 2021-07-06 NOTE — GROUP NOTE
Group Therapy Documentation    PATIENT'S NAME: Martín Shoemaker  MRN:   [id number]  :   1990  [facility]. NUMBER: [id number]  DATE OF SERVICE: 21  START TIME: 12:30 PM  END TIME:  2:30 PM  FACILITATOR(S): Juan Manuel Foster LADC  TOPIC: BEH Group Therapy  Number of patients attending the group:  7  Group Length:  2 Hours    Group Therapy Type: Recovery strategies    Summary of Group / Topics Discussed:    Self-care activities      Group Attendance:  Attended group session    Patient's response to the group topic/interactions:  cooperative with task    Patient appeared to be Actively participating.        Client specific details:  Martín participated and interacted appropriately with peers and staff in PM group. No triggers to use noted or discussed.

## 2021-07-06 NOTE — GROUP NOTE
Group Therapy Documentation    PATIENT'S NAME: Martín Shoemaker  MRN:   [id number]  :   1990  [id number]  DATE OF SERVICE: 21  START TIME:  3:00 PM  END TIME:  4:00 PM  FACILITATOR(S): Ligia Bell LADC; Kaylie Quintanilla LADC; Bipin Krueger LADC  TOPIC: BEH Group Therapy  Number of patients attending the group: 22  Group Length:  1 Hours    Group Therapy Type: Recovery strategies    Summary of Group / Topics Discussed:    Sober coping skills and Emotions/expression      Group Attendance:  Attended group session    Patient's response to the group topic/interactions:  cooperative with task    Patient appeared to be Attentive and Engaged.        Client specific details: Martín was an active participant in skills group on vulnerability.

## 2021-07-07 ENCOUNTER — HOSPITAL ENCOUNTER (OUTPATIENT)
Dept: BEHAVIORAL HEALTH | Facility: CLINIC | Age: 31
End: 2021-07-07
Attending: FAMILY MEDICINE
Payer: COMMERCIAL

## 2021-07-07 VITALS — TEMPERATURE: 98.1 F | OXYGEN SATURATION: 97 %

## 2021-07-07 PROCEDURE — H2035 A/D TX PROGRAM, PER HOUR: HCPCS | Mod: HQ

## 2021-07-07 PROCEDURE — 1002N00001 HC LODGING PLUS FACILITY CHARGE ADULT

## 2021-07-07 NOTE — GROUP NOTE
Group Therapy Documentation    PATIENT'S NAME: Martín Shoemaker  MRN:   [id number]  :   1990  [facility] NUMBER: [id number]  DATE OF SERVICE: 21  START TIME: 10:00 AM  END TIME: 11:30 AM  FACILITATOR(S): Cole De La Torre LADC  TOPIC: BEH Group Therapy  Number of patients attending the group: 7  Group Length:  1.5 Hours    Group Therapy Type: Recovery strategies    Summary of Group / Topics Discussed:    Recovery Principles      Group Attendance:  Attended group session    Patient's response to the group topic/interactions:  cooperative with task    Patient appeared to be Attentive.        Client specific details:  Sixto  attended AM group. Patient checked in, took part in a discussion on boundaries, and talked about what they are doing well and what needs to improve.

## 2021-07-07 NOTE — GROUP NOTE
Group Therapy Documentation    PATIENT'S NAME: Martín Shoemaker  MRN:   [id number]  :   1990  [facility] NUMBER: [id number]  DATE OF SERVICE: 21  START TIME:  8:30 AM  END TIME:  9:30 AM  FACILITATOR(S): Magno Bueno LADC; Svetlana Seals  TOPIC: BEH Group Therapy  Number of patients attending the group:  7  Group Length:  1 Hours    Group Therapy Type: Health and wellbeing     Summary of Group / Topics Discussed:    Balanced lifestyle      Group Attendance:  Attended group session    Patient's response to the group topic/interactions:  cooperative with task    Patient appeared to be Actively participating.        Client specific details:  Pt was receptive to group topics and participated actively.

## 2021-07-07 NOTE — GROUP NOTE
Group Therapy Documentation    PATIENT'S NAME: Martín Shoemaker  MRN:   [id number]  :   1990  [facility] NUMBER: [id number]  DATE OF SERVICE: 21  START TIME: 12:30 PM  END TIME:  2:30 PM  FACILITATOR(S): Juan Manuel Foster LADC  TOPIC: BEH Group Therapy  Number of patients attending the group:  7  Group Length:  2 Hours    Group Therapy Type: Emotion processing    Summary of Group / Topics Discussed:    Relationship/socialization      Group Attendance:  Attended group session    Patient's response to the group topic/interactions:  cooperative with task    Patient appeared to be Actively participating.        Client specific details:  Martín participated and interacted appropriately with peers and staff in PM group. No triggers to use noted or discussed.

## 2021-07-08 ENCOUNTER — HOSPITAL ENCOUNTER (OUTPATIENT)
Dept: BEHAVIORAL HEALTH | Facility: CLINIC | Age: 31
End: 2021-07-08
Attending: FAMILY MEDICINE
Payer: COMMERCIAL

## 2021-07-08 VITALS — OXYGEN SATURATION: 98 % | TEMPERATURE: 97.5 F

## 2021-07-08 PROCEDURE — 1002N00001 HC LODGING PLUS FACILITY CHARGE ADULT

## 2021-07-08 PROCEDURE — H2035 A/D TX PROGRAM, PER HOUR: HCPCS | Mod: HQ

## 2021-07-08 NOTE — GROUP NOTE
Group Therapy Documentation    PATIENT'S NAME: Martín Shoemaker  MRN:   [id number]  :   1990  [facility] NUMBER: [id number]  DATE OF SERVICE: 21  START TIME: 12:30 PM  END TIME:  2:30 PM  FACILITATOR(S): Juan Manuel Foster LADC  TOPIC: BEH Group Therapy  Number of patients attending the group:  7  Group Length:  2 Hours    Group Therapy Type: Emotion processing    Summary of Group / Topics Discussed:    Sober coping skills      Group Attendance:  Attended group session    Patient's response to the group topic/interactions:  cooperative with task    Patient appeared to be Actively participating.        Client specific details:  Martín participated and interacted appropriately with peers and staff in PM group. No triggers to use noted or discussed.

## 2021-07-08 NOTE — GROUP NOTE
Group Therapy Documentation    PATIENT'S NAME: Martín Shoemaker  MRN:   [id number]  :   1990  [facility] NUMBER: [id number]  DATE OF SERVICE: 21  START TIME:  9:00 AM  END TIME: 11:00 AM  FACILITATOR(S): Cole De La Torre LADC  TOPIC: BEH Group Therapy  Number of patients attending the group:  6  Group Length:  2 Hours    Group Therapy Type: Recovery strategies    Summary of Group / Topics Discussed:    Recovery Principles      Group Attendance:  Attended group session    Patient's response to the group topic/interactions:  cooperative with task    Patient appeared to be Attentive.        Client specific details:  Sixto  attended AM group. Patient took part in a checked in, talked about short term goals, and gave a peer feedback on their Drug Use History presentation.

## 2021-07-08 NOTE — GROUP NOTE
Psychoeducation Group Documentation    PATIENT'S NAME: Martín Shoemaker  MRN:   [id number]  :   1990  [facility] NUMBER: [id number]  DATE OF SERVICE: 21  START TIME:  3:00 PM  END TIME:  4:00 PM  FACILITATOR(S): Perry Joseph MD; Svetlana Seals; Eladio Werner [facility]  TOPIC: BEH Pyschoeducation  Number of patients attending the group:  26  Group Length:  1 Hours    Skills Group Therapy Type: Recovery skills    Summary of Group / Topics Discussed:    Symptom management skills and Medication management skills          Group Attendance:  Attended group session    Patient's response to the group topic/interactions:  cooperative with task    Patient appeared to be Attentive and Engaged.         Client specific details:  .

## 2021-07-09 ENCOUNTER — HOSPITAL ENCOUNTER (OUTPATIENT)
Dept: BEHAVIORAL HEALTH | Facility: CLINIC | Age: 31
End: 2021-07-09
Attending: FAMILY MEDICINE
Payer: COMMERCIAL

## 2021-07-09 VITALS — OXYGEN SATURATION: 97 % | TEMPERATURE: 96 F

## 2021-07-09 PROCEDURE — 1002N00001 HC LODGING PLUS FACILITY CHARGE ADULT

## 2021-07-09 PROCEDURE — H2035 A/D TX PROGRAM, PER HOUR: HCPCS | Mod: HQ

## 2021-07-09 NOTE — GROUP NOTE
Group Therapy Documentation    PATIENT'S NAME: Martín Shoemaker  MRN:   [id number]  :   1990  [facility] NUMBER: [id number]  DATE OF SERVICE: 21  START TIME:  9:00 AM  END TIME: 11:00 AM  FACILITATOR(S): Cole De La Torre LADC  TOPIC: BEH Group Therapy  Number of patients attending the group:  6  Group Length:  2 Hours    Group Therapy Type: Recovery strategies    Summary of Group / Topics Discussed:    Recovery Principles      Group Attendance:  Attended group session    Patient's response to the group topic/interactions:  cooperative with task    Patient appeared to be Attentive.        Client specific details:  Martín attended AM group. Patient took part in a good discussion regarding euphoric recall. Patient also checked in, talked about a fear they recently faced, and who/what inspires them.

## 2021-07-09 NOTE — GROUP NOTE
Group Therapy Documentation    PATIENT'S NAME: Martín Shoemaker  MRN:   [id number]  :   1990  [id number]  DATE OF SERVICE: 21  START TIME: 12:30 PM  END TIME:  2:30 PM  FACILITATOR(S): Svetlana Seals; Eladio Werner LADC  TOPIC: BEH Group Therapy  Number of patients attending the group:  16  Group Length:  2 Hours    Group Therapy Type: Recovery strategies    Summary of Group / Topics Discussed:    Relationship/socialization, Disease of addiction, and Leisure explorations/use of leisure time      Group Attendance:  Attended group session    Patient's response to the group topic/interactions:  cooperative with task    Patient appeared to be Attentive and Engaged.        Client specific details:  .

## 2021-07-10 ENCOUNTER — HOSPITAL ENCOUNTER (OUTPATIENT)
Dept: BEHAVIORAL HEALTH | Facility: CLINIC | Age: 31
End: 2021-07-10
Attending: FAMILY MEDICINE
Payer: COMMERCIAL

## 2021-07-10 VITALS — TEMPERATURE: 97.3 F | OXYGEN SATURATION: 99 %

## 2021-07-10 PROCEDURE — 1002N00001 HC LODGING PLUS FACILITY CHARGE ADULT

## 2021-07-10 PROCEDURE — H2035 A/D TX PROGRAM, PER HOUR: HCPCS | Mod: HQ

## 2021-07-10 NOTE — GROUP NOTE
Group Therapy Documentation    PATIENT'S NAME: Martín Shoemaker  MRN:   [id number]  [facility]   1990  [id number]  DATE OF SERVICE: 7/10/21  START TIME:  9:00 AM  END TIME: 11:00 AM  FACILITATOR(S): Isaias Talbert LADC; Eladio Werner LADC; Juan Manuel Foster LADC  TOPIC: BEH Group Therapy  Number of patients attending the group:  27  Group Length:  2 Hours    Group Therapy Type:  Relationships workshop    Summary of Group / Topics Discussed:    Recovery Principles, Relationship/socialization, and Emotions/expression      Group Attendance:  Attended group session    Patient's response to the group topic/interactions:  cooperative with task    Patient appeared to be Inattentive and Passively engaged.        Client specific details:  .

## 2021-07-10 NOTE — GROUP NOTE
Group Therapy Documentation    PATIENT'S NAME: Martín Shoemaker  MRN:   [id number]  :   1990  [facility] NUMBER: [id number]  DATE OF SERVICE: 7/10/21  START TIME: 12:30 PM  END TIME:  2:30 PM  FACILITATOR(S): Juan Manuel Foster LADC; Eladio Werner LADC; Isaias Talbert [facility]  TOPIC: BEH Group Therapy  Number of patients attending the group:  28  Group Length:  2 Hours    Group Therapy Type:  Relationships workshop    Summary of Group / Topics Discussed:    Recovery Principles, Relationship/socialization, Balanced lifestyle, and Self-care activities      Group Attendance:  Attended group session    Patient's response to the group topic/interactions:  cooperative with task    Patient appeared to be Actively participating, Attentive and Engaged.        Client specific details:  .

## 2021-07-11 ENCOUNTER — HOSPITAL ENCOUNTER (OUTPATIENT)
Dept: BEHAVIORAL HEALTH | Facility: CLINIC | Age: 31
End: 2021-07-11
Attending: FAMILY MEDICINE
Payer: COMMERCIAL

## 2021-07-11 VITALS — OXYGEN SATURATION: 97 % | TEMPERATURE: 98 F

## 2021-07-11 PROCEDURE — 1002N00001 HC LODGING PLUS FACILITY CHARGE ADULT

## 2021-07-11 PROCEDURE — H2035 A/D TX PROGRAM, PER HOUR: HCPCS | Mod: HQ

## 2021-07-11 NOTE — GROUP NOTE
Psychoeducation Group Documentation    PATIENT'S NAME: Martín Shoemaker  MRN:   [id number]  :   1990  [facility] NUMBER: [id number]  DATE OF SERVICE: 21  START TIME: 12:30 PM  END TIME:  1:30 PM  FACILITATOR(S): Mamadou Amos LADC; Eladio Werner LADC  TOPIC: BEH Pyschoeducation  Number of patients attending the group: 26  Group Length:  1 Hours    Skills Group Therapy Type: Recovery skills    Summary of Group / Topics Discussed:    Relationship/social skills, Balanced lifestyle skills, and Relapse prevention skills          Group Attendance:  Attended group session    Patient's response to the group topic/interactions:  cooperative with task    Patient appeared to be Attentive and Engaged.         Client specific details:  .

## 2021-07-11 NOTE — GROUP NOTE
Psychoeducation Group Documentation    PATIENT'S NAME: Martín Shoemaker  MRN:   [id number]  :   1990  [facility] NUMBER: [id number]  DATE OF SERVICE: 21  START TIME:  8:45 AM  END TIME: 10:35 AM  FACILITATOR(S): Nancy Rothman RN; Eladio Werner LADC; Karan Graham [facility]  TOPIC: BEH Pyschoeducation  Number of patients attending the group:  26  Group Length:  2 Hours    Skills Group Therapy Type: Aids/Hiv presentation    Summary of Group / Topics Discussed:    Symptom management skills and Medication management skills          Group Attendance:  Attended group session    Patient's response to the group topic/interactions:  cooperative with task    Patient appeared to be Attentive and Engaged.         Client specific details:  .

## 2021-07-12 ENCOUNTER — HOSPITAL ENCOUNTER (OUTPATIENT)
Dept: BEHAVIORAL HEALTH | Facility: CLINIC | Age: 31
End: 2021-07-12
Attending: FAMILY MEDICINE
Payer: COMMERCIAL

## 2021-07-12 VITALS — OXYGEN SATURATION: 96 % | TEMPERATURE: 97.7 F

## 2021-07-12 PROCEDURE — 1002N00001 HC LODGING PLUS FACILITY CHARGE ADULT

## 2021-07-12 PROCEDURE — H2035 A/D TX PROGRAM, PER HOUR: HCPCS | Mod: HQ

## 2021-07-12 NOTE — GROUP NOTE
Group Therapy Documentation    PATIENT'S NAME: Martín Shoemaker  MRN:   [id number]  :   1990  [facility] NUMBER: [id number]  DATE OF SERVICE: 21  START TIME:  9:00 AM  END TIME: 11:00 AM  FACILITATOR(S): Cole De La Torre LADC  TOPIC: BEH Group Therapy  Number of patients attending the group:  6  Group Length:  2 Hours    Group Therapy Type: Recovery strategies    Summary of Group / Topics Discussed:    Recovery Principles      Group Attendance:  Attended group session    Patient's response to the group topic/interactions:  cooperative with task    Patient appeared to be Attentive.        Client specific details:  Sixto attended AM group. He checked in, took part in a mindfulness exercise, and gave feedback on peers assignments.

## 2021-07-12 NOTE — GROUP NOTE
Group Therapy Documentation    PATIENT'S NAME: Martín Shoemaker  MRN:   [id number]  :   1990  [facility]. NUMBER: [id number]  DATE OF SERVICE: 21  START TIME: 12:30 AM  END TIME:  2:30 PM  FACILITATOR(S): Juan Manuel Foster LADC  TOPIC: BEH Group Therapy  Number of patients attending the group:  6  Group Length:  2 Hours    Group Therapy Type: Health and wellbeing     Summary of Group / Topics Discussed:    Spiritual Care      Group Attendance:  Attended group session    Patient's response to the group topic/interactions:  cooperative with task    Patient appeared to be Actively participating.        Client specific details:  Martín participated and interacted appropriately with peers and staff in PM group. No triggers to use noted or discussed.

## 2021-07-12 NOTE — PROGRESS NOTES
Patient:  Martín Shoemaker        Day Monday Tuesday Wednesday Thursday Friday Saturday Janusz   Group Hours   4 hours 4 hours 5 hours 4 hours 4 hours 4 hours 3 hours   Skills Hours   0 hours 1.0 hours 0 hours 1.0 hours 0 hours 0 hours 0 hours   Individual Session (LADC)    0 hours 0 hours 0 hours 0 hours 0 hours 0 hours 0 hours   Individual Session  (psychotherapy)   0 hours 0 hours 0 hours 0 hours 0 hours 0 hours 0 hours   Peer-led Recovery Group   1.0 hours 1.0 hours 1.0 hours 1.0 hours 1.0 hours 1.0 hours 1.0 hours                 Adult CD Progress Note and Treatment Plan Review     Attendance  Please refer to OP BEH CD Adult Attendance Record Documentation Flowsheet    Support group attended this week: yes    Reporting sobriety: yes    Treatment Plan     Treatment Plan Review competed on: 7/12/21       Client preferred learning style: Visual  Hands on  Verbal    Staff Members contributing: ANTONELLA Watson, ANTONELLA Parikh                       Received Supervision: No    Client: contributed to goals and plan.    Client received copy of plan/revised plan: Yes    Client agrees with plan/revised plan: Yes    Changes to Treatment Plan: No    New Goals added since last review: None at this time    Goals worked on since last review: Aftercare planning, sobriety, tx plan assignments, group therapy, build sober support network, psychoeducation. Assignment Staying Positive.      Strategies effective: yes    Strategies need these changes: None at this time    1) Care Coordination Activities: Pt is beginning to look at aftercare options.    2) Medical, Mental Health, and other appointments the client attended: Pt reported no appointments this past week.  3) Medication issues: Pt reported no concerns at this time.  4) Physical and mental health problems: Pt reported no concerns at this time.  5) Any changes in Vulnerable Adult Status? No. If yes, add to treatment plan and individual abuse prevention plan.  6) Review  "and evaluation of the individual abuse prevention plan: Current IAPP for this program is adequate for this client.    ASAM Risk Rating:    Dimension 1 0 Patient reports his last use date for alcohol as 6/27, last use date for cannabis as 6/22/21.  Patient does not report any PAWS this past week.     Dimension 2 0 Patient denies any current biomedical conditions that would interfere with CINTHIA treatment. Pt did not attend any healthcare appointments and denied scheduling any appointments for the future.    Dimension 3 2 Patient reports past diagnoses of anxiety and depression. Pt denied having suicidal thoughts at this time. Pt reported some changes in his mood, ie: \"more up and positive\" and reports no changes in his stress level this past week. Pt reported using \"talking to peers\" as his coping techniques for dealing with difficult emotions this past week. Patient attended a Spiritual Care group session facilitated by Bita Burkett.    Dimension 4 1  Patient appears motivated with active reinforcement. Patient appears to be in the contemplation Stage of Change. Pt is active in group process, accepts and provides feedback, has good insight, and appears engaged. Pt is supportive of his  group peers. Pt reported that \"his parents\" are who motivated him to be sober and to stay in treatment this week.    Dimension 5 4 Patient has a history of previous CINTHIA treatments and subsequent relapse. Patient needs to identify his relapse triggers/warning signs and ways they can cope and/or avoid them in recovery.   Pt denied any cravings this past week. Pt reported not using any coping skills he used to manage cravings this past week due to not having any cravings. Patient attended the Relationships weekend workshop this past week.    Dimension 6 3 Patient reports being single with no children. He lives alone and is employed.  Pt denies current legal involvement. Pt lacks a sober support network. Pt has been attending sober " support meetings while in treatment.      Guide to C-SSRS Risk Ratings   NO IDEATION:  with no active thoughts IDEATION: with a wish to die. IDEATION: with active thoughts. Risk Ratings   If Yes No No 0 - Very Low Risk   If NA Yes No 1 - Low Risk   If NA Yes Yes 2 - Low/moderate risk   IDEATION: associated thoughts of methods without intent or plan INTENT: Intent to follow through on suicide PLAN: Plan to follow through on suicide Risk Ratings cont...   If Yes No No 3 - Moderate Risk   If Yes Yes No 4 - High Risk   If Yes Yes Yes 5 - High Risk   The patient's ADDITIONAL RISK FACTORS and lack of PROTECTIVE FACTORS may increase their overall suicide risk ratings.          Patient's current risk rating:  Past 24 hours: 0. - Very Low     Any changes in Vulnerable Adult Status?  No  If yes, add to treatment plan and individual abuse prevention plan.    Family Involvement:   Mom and Dad    Data:   offered feedback client did participate       Intervention:   Aftercare planning  Behavior modification  Cognitive Behavioral Therapy  Counselor feedback  Education  Emotional management  Group feedback  Motivational Enhancement Therapy  Relapse prevention  Twelve Step facilitation  Client & counselor reviewed and signed ISP & assessment summary  Mental health education       Assessment:   Stages of Change Model  Contemplation    Appears/Sounds:  Cooperative       Plan:  Focus on recovery environment  Monitor emotional/physical health    Continue group therapy, go to AA/NA meetings when available, work with sponsor, build sober support network, engage in daily structured activities, and have sober fun.      ANTONELLA Parikh

## 2021-07-13 ENCOUNTER — HOSPITAL ENCOUNTER (OUTPATIENT)
Dept: BEHAVIORAL HEALTH | Facility: CLINIC | Age: 31
End: 2021-07-13
Attending: FAMILY MEDICINE
Payer: COMMERCIAL

## 2021-07-13 VITALS — OXYGEN SATURATION: 96 % | TEMPERATURE: 97.2 F

## 2021-07-13 PROCEDURE — 1002N00001 HC LODGING PLUS FACILITY CHARGE ADULT

## 2021-07-13 PROCEDURE — H2035 A/D TX PROGRAM, PER HOUR: HCPCS | Mod: HQ

## 2021-07-13 NOTE — GROUP NOTE
Group Therapy Documentation    PATIENT'S NAME: Martín Shoemaker  MRN:   [id number]  :   1990  ACCT. NUMBER: [id number]  DATE OF SERVICE: 21  START TIME: 12:30 PM  END TIME:  2:30 PM  FACILITATOR(S): Juan Manuel Foster LADC  TOPIC: BEH Group Therapy  Number of patients attending the group:  5  Group Length:  2 Hours    Group Therapy Type: Health and wellbeing     Summary of Group / Topics Discussed:    Self-care activities      Group Attendance:  Attended group session    Patient's response to the group topic/interactions:  cooperative with task    Patient appeared to be Actively participating.        Client specific details:  Martín participated and interacted appropriately with peers and staff in PM group. No triggers to use noted or discussed.

## 2021-07-13 NOTE — GROUP NOTE
Psychoeducation Group Documentation    PATIENT'S NAME: Martín Shoemaker  MRN:   [id number]  :   1990  ACCT. NUMBER: [id number]  DATE OF SERVICE: 21  START TIME:  3:00 PM  END TIME:  4:00 PM  FACILITATOR(S): Kaylie Quintanilla ADC-T; Svetlana Seals; Anay Soto LADC  TOPIC: BEH Pyschoeducation  Number of patients attending the group:  22  Group Length:  1 Hours    Skills Group Therapy Type: Daily living/independence skills    Summary of Group / Topics Discussed:    Relationship/social skills and Balanced lifestyle skills    Group Attendance:  Attended group session    Patient's response to the group topic/interactions:  cooperative with task    Patient appeared to be Attentive and Engaged.         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

## 2021-07-13 NOTE — GROUP NOTE
Group Therapy Documentation    PATIENT'S NAME: Martín Shoemaker  MRN:   [id number]  :   1990  ACCT. NUMBER: [id number]  DATE OF SERVICE: 21  START TIME:  9:00 AM  END TIME: 11:00 AM  FACILITATOR(S): Cole De L aTorre LADC  TOPIC: BEH Group Therapy  Number of patients attending the group:  5  Group Length:  2 Hours    Group Therapy Type: Recovery strategies    Summary of Group / Topics Discussed:    Recovery Principles      Group Attendance:  Attended group session    Patient's response to the group topic/interactions:  cooperative with task    Patient appeared to be Attentive.        Client specific details:  Martín attended AM group. Patient checked in, took part in a discussion on resilience, graduated a peer, and gave a peer feedback on a presented assignment.

## 2021-07-14 ENCOUNTER — HOSPITAL ENCOUNTER (OUTPATIENT)
Dept: BEHAVIORAL HEALTH | Facility: CLINIC | Age: 31
End: 2021-07-14
Attending: FAMILY MEDICINE
Payer: COMMERCIAL

## 2021-07-14 VITALS — TEMPERATURE: 97.8 F | OXYGEN SATURATION: 96 %

## 2021-07-14 PROCEDURE — 1002N00001 HC LODGING PLUS FACILITY CHARGE ADULT

## 2021-07-14 PROCEDURE — H2035 A/D TX PROGRAM, PER HOUR: HCPCS | Mod: HQ

## 2021-07-14 NOTE — GROUP NOTE
Group Therapy Documentation    PATIENT'S NAME: Martín Shoemaker  MRN:   [id number]  :   1990  ACCT. NUMBER: [id number]  DATE OF SERVICE: 21  START TIME:  9:40 AM  END TIME: 11:20 AM  FACILITATOR(S): Cole De La Torre LADC  TOPIC: BEH Group Therapy  Number of patients attending the group:  5  Group Length:  1.5 Hours    Group Therapy Type: Recovery strategies    Summary of Group / Topics Discussed:    Recovery Principles and Relationship/socialization      Group Attendance:  Attended group session    Patient's response to the group topic/interactions:  cooperative with task    Patient appeared to be Attentive.        Client specific details:  Martín attended AM group. Patient took part in a group discussion on what they want for their recovery, checked in, spoke about gratitude, and was given  feedback from peers on a presented assignment (Anxiety and Substance Abuse).

## 2021-07-14 NOTE — GROUP NOTE
Group Therapy Documentation    PATIENT'S NAME: Martín Shoemaker  MRN:   [id number]  :   1990  [id number]  DATE OF SERVICE: 21  START TIME:  8:30 AM  END TIME:  9:30 AM  FACILITATOR(S): Cole De La Torre LADC  TOPIC: BEH Group Therapy  Number of patients attending the group:  6  Group Length:  1 Hours    Group Therapy Type: Recovery strategies    Summary of Group / Topics Discussed:    Recovery Principles and Disease of addiction      Group Attendance:  Attended group session    Patient's response to the group topic/interactions:  cooperative with task    Patient appeared to be Attentive.        Client specific details:  Martín  attended the AM skills group. Patient took part in psychoeducational information presented by Dr Joseph. Patient took part in a discussion about the disease of addiction and implementing healthy coping skills.

## 2021-07-14 NOTE — ADDENDUM NOTE
Encounter addended by: Suellen Rosales on: 7/14/2021 9:44 AM   Actions taken: Charge Capture section accepted

## 2021-07-15 ENCOUNTER — HOSPITAL ENCOUNTER (OUTPATIENT)
Dept: BEHAVIORAL HEALTH | Facility: CLINIC | Age: 31
End: 2021-07-15
Attending: FAMILY MEDICINE
Payer: COMMERCIAL

## 2021-07-15 VITALS — TEMPERATURE: 98 F | OXYGEN SATURATION: 99 %

## 2021-07-15 PROCEDURE — 1002N00001 HC LODGING PLUS FACILITY CHARGE ADULT

## 2021-07-15 PROCEDURE — H2035 A/D TX PROGRAM, PER HOUR: HCPCS | Mod: HQ

## 2021-07-15 NOTE — GROUP NOTE
Group Therapy Documentation    PATIENT'S NAME: Martín Shoemaker  MRN:   [id number]  :   1990  [id number]  DATE OF SERVICE: 7/15/21  START TIME: 12:30 PM  END TIME:  2:30 PM  FACILITATOR(S): Svetlana Seals  TOPIC: BEH Group Therapy  Number of patients attending the group:  5  Group Length:  2 Hours    Group Therapy Type: Recovery strategies, Emotion processing, and Health and wellbeing     Summary of Group / Topics Discussed:    Recovery Principles, Sober coping skills, Relationship/socialization, Balanced lifestyle, and Relapse prevention      Group Attendance:  Attended group session    Patient's response to the group topic/interactions:  cooperative with task    Patient appeared to be Actively participating, Attentive and Engaged.        Client specific details:  Patient was attentive and participative during group session.

## 2021-07-15 NOTE — GROUP NOTE
Group Therapy Documentation    PATIENT'S NAME: Martín Shoemaker  MRN:   [id number]  :   1990  ACCT. NUMBER: [id number]  DATE OF SERVICE: 7/15/21  START TIME:  9:00 AM  END TIME: 11:00 AM  FACILITATOR(S): Juan Manuel Foster LADC  TOPIC: BEH Group Therapy  Number of patients attending the group:  5  Group Length:  2 Hours    Group Therapy Type: Emotion processing    Summary of Group / Topics Discussed:    Emotions/expression      Group Attendance:  Attended group session    Patient's response to the group topic/interactions:  cooperative with task    Patient appeared to be Actively participating.        Client specific details:  Martín participated and interacted appropriately with peers and staff in AM group. No triggers to use noted or discussed.

## 2021-07-15 NOTE — GROUP NOTE
Psychoeducation Group Documentation    PATIENT'S NAME: Martín Shoemaker  MRN:   [id number]  :   1990  ACCT. NUMBER: [id number]  DATE OF SERVICE: 7/15/21  START TIME:  3:00 PM  END TIME:  4:00 PM  FACILITATOR(S): Svetlana Seals; Eladio Werner LADC; Kaylie Quintanilla ADC-T  TOPIC: BEH Pyschoeducation  Number of patients attending the group:  26  Group Length:  1 Hours    Skills Group Therapy Type: Recovery skills and Healthy behaviors development    Summary of Group / Topics Discussed:    Balanced lifestyle skills, Symptom management skills, and dual diagnosis    Group Attendance:  Attended group session    Patient's response to the group topic/interactions:  cooperative with task    Patient appeared to be Attentive and Engaged.         Client specific details:  Pt listened respectfully to Dr. Contreras's lecture on dual diagnosis and the interplay between chemical dependency and mental health problems.

## 2021-07-16 ENCOUNTER — HOSPITAL ENCOUNTER (OUTPATIENT)
Dept: BEHAVIORAL HEALTH | Facility: CLINIC | Age: 31
End: 2021-07-16
Attending: FAMILY MEDICINE
Payer: COMMERCIAL

## 2021-07-16 VITALS — OXYGEN SATURATION: 98 % | TEMPERATURE: 97.8 F

## 2021-07-16 PROCEDURE — 1002N00001 HC LODGING PLUS FACILITY CHARGE ADULT

## 2021-07-16 PROCEDURE — H2035 A/D TX PROGRAM, PER HOUR: HCPCS | Mod: HQ

## 2021-07-16 NOTE — GROUP NOTE
Group Therapy Documentation    PATIENT'S NAME: Martín Shoemaker  MRN:   [id number]  :   1990  ACCT. NUMBER: [id number]  DATE OF SERVICE: 21  START TIME: 12:30 PM  END TIME:  2:30 PM  FACILITATOR(S): Juan Manuel Foster LADC  TOPIC: BEH Group Therapy  Number of patients attending the group:  5  Group Length:  2 Hours    Group Therapy Type: Recovery strategies    Summary of Group / Topics Discussed:    Sober coping skills      Group Attendance:  Attended group session    Patient's response to the group topic/interactions:  cooperative with task    Patient appeared to be Actively participating.        Client specific details:  Martín participated and interacted appropriately with peers and staff in PM group. No triggers to use noted or discussed.

## 2021-07-16 NOTE — GROUP NOTE
Group Therapy Documentation    PATIENT'S NAME: Martín Shoemaker  MRN:   [id number]  :   1990  [id number]  DATE OF SERVICE: 21  START TIME:  9:00 AM  END TIME: 11:00 AM  FACILITATOR(S): Svetlana Seals  TOPIC: BEH Group Therapy  Number of patients attending the group:  5  Group Length:  2 Hours    Group Therapy Type: Recovery strategies, Emotion processing, Daily living/independence skills, and Health and wellbeing     Summary of Group / Topics Discussed:    Recovery Principles, Sober coping skills, Relationship/socialization, and Relapse prevention      Group Attendance:  Attended group session    Patient's response to the group topic/interactions:  cooperative with task    Patient appeared to be Actively participating, Attentive and Engaged.        Client specific details:  Patient was attentive and participative during group session.

## 2021-07-17 ENCOUNTER — HOSPITAL ENCOUNTER (OUTPATIENT)
Dept: BEHAVIORAL HEALTH | Facility: CLINIC | Age: 31
End: 2021-07-17
Attending: FAMILY MEDICINE
Payer: COMMERCIAL

## 2021-07-17 VITALS — OXYGEN SATURATION: 100 % | TEMPERATURE: 96.6 F

## 2021-07-17 PROCEDURE — H2035 A/D TX PROGRAM, PER HOUR: HCPCS | Mod: HQ

## 2021-07-17 PROCEDURE — 1002N00001 HC LODGING PLUS FACILITY CHARGE ADULT

## 2021-07-17 NOTE — GROUP NOTE
Group Therapy Documentation    PATIENT'S NAME: Martín Shoemaker  MRN:   [id number]  :   1990  ACCT. NUMBER: [id number]  DATE OF SERVICE: 21  START TIME:  9:00 AM  END TIME: 11:00 AM  FACILITATOR(S): [facility] Torre [facility]; Ligia Bell LAD; Angi Anaya [facility]  TOPIC: BEH Group Therapy  Number of patients attending the group:  26  Group Length:  2 Hours    Group Therapy Type: Recovery strategies    Summary of Group / Topics Discussed:    Recovery Principles      Group Attendance:  Attended group session    Patient's response to the group topic/interactions:  cooperative with task    Patient appeared to be Attentive.        Client specific details:  Martín attended AM workshop group. They took part in a psycho-educational discussion on the importance of honesty and other values in recovery. Patient took part in a team exercise.

## 2021-07-17 NOTE — GROUP NOTE
Group Therapy Documentation    PATIENT'S NAME: Martín Shoemaker  MRN:   [id number]  :   1990  ACCT. NUMBER: [id number]  DATE OF SERVICE: 21  START TIME: 12:30 PM  END TIME:  2:30 PM  FACILITATOR(S): Ligia Bell LADC; Angi Anaya LADC; Cole De La Torre LADC  TOPIC: BEH Group Therapy  Number of patients attending the group: 26  Group Length:  2 Hours    Group Therapy Type: Recovery strategies    Summary of Group / Topics Discussed:    Relationship/socialization      Group Attendance:  Attended group session    Patient's response to the group topic/interactions:  cooperative with task    Patient appeared to be Actively participating, Attentive and Engaged.        Client specific details: Martín was an active participant in afternoon Relationships workshop on identifying personal strengths and skills used for recovery goals.

## 2021-07-18 ENCOUNTER — HOSPITAL ENCOUNTER (OUTPATIENT)
Dept: BEHAVIORAL HEALTH | Facility: CLINIC | Age: 31
End: 2021-07-18
Attending: FAMILY MEDICINE
Payer: COMMERCIAL

## 2021-07-18 VITALS — OXYGEN SATURATION: 96 % | TEMPERATURE: 97.7 F

## 2021-07-18 PROCEDURE — H2035 A/D TX PROGRAM, PER HOUR: HCPCS | Mod: HQ

## 2021-07-18 PROCEDURE — 1002N00001 HC LODGING PLUS FACILITY CHARGE ADULT

## 2021-07-18 NOTE — GROUP NOTE
Group Therapy Documentation    PATIENT'S NAME: Martín Shoemaker  MRN:   [id number]  :   1990  ACCT. NUMBER: [id number]  DATE OF SERVICE: 21  START TIME: 12:30 AM  END TIME:  1:30 PM  FACILITATOR(S): Cole De La Torre [facility]; Angi Anaya [facility]  TOPIC: BEH Group Therapy  Number of patients attending the group:  25  Group Length:  1 Hours    Group Therapy Type: Recovery strategies    Summary of Group / Topics Discussed:    Recovery Principles, Sober coping skills, and Balanced lifestyle      Group Attendance:  Attended group session    Patient's response to the group topic/interactions:  cooperative with task    Patient appeared to be Attentive.        Client specific details:  Martín took part in PM skills group. The topic was substance abuse and it's affect on relationships, and implementing healthy coping skills in recovery.

## 2021-07-18 NOTE — GROUP NOTE
Group Therapy Documentation    PATIENT'S NAME: Martín Shoemaker  MRN:   [id number]  :   1990  ACCT. NUMBER: [id number]  DATE OF SERVICE: 21  START TIME:  9:00 AM  END TIME: 11:00 AM  FACILITATOR(S): Cole De La Torre [facility]; Cecelia Javed RN; Angi Anaya [facility]  TOPIC: BEH Group Therapy  Number of patients attending the group: 25  Group Length:  2 Hours    Group Therapy Type: Recovery strategies    Summary of Group / Topics Discussed:    Recovery Principles      Group Attendance:  Attended group session    Patient's response to the group topic/interactions:  cooperative with task    Patient appeared to be Attentive.        Client specific details:  Martín attended the AM workshop. The topics included TB, Hep A-C.

## 2021-07-19 ENCOUNTER — HOSPITAL ENCOUNTER (OUTPATIENT)
Dept: BEHAVIORAL HEALTH | Facility: CLINIC | Age: 31
End: 2021-07-19
Attending: FAMILY MEDICINE
Payer: COMMERCIAL

## 2021-07-19 VITALS — OXYGEN SATURATION: 97 % | TEMPERATURE: 97.4 F

## 2021-07-19 PROCEDURE — H2035 A/D TX PROGRAM, PER HOUR: HCPCS | Mod: HQ

## 2021-07-19 PROCEDURE — 1002N00001 HC LODGING PLUS FACILITY CHARGE ADULT

## 2021-07-19 NOTE — GROUP NOTE
Group Therapy Documentation    PATIENT'S NAME: Martín Shoemaker  MRN:   [id number]  :   1990  [id number]  DATE OF SERVICE: 21  START TIME:  9:00 AM  END TIME: 11:00 AM  FACILITATOR(S): Svetlana Seals  TOPIC: BEH Group Therapy  Number of patients attending the group:  6  Group Length:  2 Hours    Group Therapy Type: Recovery strategies, Emotion processing, Daily living/independence skills, and Health and wellbeing     Summary of Group / Topics Discussed:    Recovery Principles, Spiritual Care, Sober coping skills, Relationship/socialization, and Relapse prevention      Group Attendance:  Attended group session    Patient's response to the group topic/interactions:  cooperative with task    Patient appeared to be Actively participating, Attentive and Engaged.        Client specific details:  Patient was attentive and participative during group session.

## 2021-07-20 ENCOUNTER — HOSPITAL ENCOUNTER (OUTPATIENT)
Dept: BEHAVIORAL HEALTH | Facility: CLINIC | Age: 31
End: 2021-07-20
Attending: FAMILY MEDICINE
Payer: COMMERCIAL

## 2021-07-20 VITALS — TEMPERATURE: 97.8 F | OXYGEN SATURATION: 100 %

## 2021-07-20 PROCEDURE — H2035 A/D TX PROGRAM, PER HOUR: HCPCS | Mod: HQ

## 2021-07-20 PROCEDURE — 1002N00001 HC LODGING PLUS FACILITY CHARGE ADULT

## 2021-07-20 NOTE — GROUP NOTE
Group Therapy Documentation    PATIENT'S NAME: Martín Shoemaker  MRN:   [id number]  :   1990  ACCT. NUMBER: [id number]  DATE OF SERVICE: 21  START TIME:  3:00 PM  END TIME:  4:00 PM  FACILITATOR(S): Svetlana Seals; Eladio Werner LADC  TOPIC: BEH Group Therapy  Number of patients attending the group:  25  Group Length:  1 Hours    Group Therapy Type: Health and wellbeing     Summary of Group / Topics Discussed:    Recovery Principles, Relationship/socialization, Balanced lifestyle, and Disease of addiction      Group Attendance:  Attended group session    Patient's response to the group topic/interactions:  cooperative with task    Patient appeared to be Attentive and Engaged.        Client specific details:  .

## 2021-07-20 NOTE — PROGRESS NOTES
Patient:  Martín Shoemaker        Day Monday Tuesday Wednesday Thursday Friday Saturday Janusz   Group Hours   4 hours 4 hours 5 hours 4 hours 4 hours 4 hours 3 hours   Skills Hours   0 hours 1.0 hours 0 hours 1.0 hours 0 hours 0 hours 0 hours   Individual Session (LADC)    0 hours 0 hours 0 hours 0 hours 0 hours 0 hours 0 hours   Individual Session  (psychotherapy)   0 hours 0 hours 0 hours 0 hours 0 hours 0 hours 0 hours   Peer-led Recovery Group   1.0 hours 1.0 hours 1.0 hours 1.0 hours 1.0 hours 1.0 hours 1.0 hours                 Adult CD Progress Note and Treatment Plan Review     Attendance  Please refer to OP BEH CD Adult Attendance Record Documentation Flowsheet    Support group attended this week: yes    Reporting sobriety: yes    Treatment Plan     Treatment Plan Review competed on: 7/20/21    Client preferred learning style: Visual  Hands on  Verbal    Staff Members contributing: Cole De La Torre [facility] [place], Juan Manuel Foster [facility] [place],Svetlana Seals [facility] [place]                         Received Supervision: No    Client: contributed to goals and plan.    Client received copy of plan/revised plan: Yes    Client agrees with plan/revised plan: Yes    Changes to Treatment Plan: No    New Goals added since last review: None at this time    Goals worked on since last review: Motivation, Change, Relationships, Relapse Prevention, Drug Use History, Anxiety, Depression, 5 Years Sober vs 5 Years Using, First Step, Resentsments    Strategies effective: yes    Strategies need these changes: None at this time    1) Care Coordination Activities: Patient will work with counselors and  to address any such issues.  2) Medical, Mental Health, and other appointments the client attended: Pt reported no appointments this past week.  3) Medication issues: Pt reported no concerns at this time.  4) Physical and mental health problems: Pt reported no concerns at this time.  5) Any changes in Vulnerable Adult Status? No. If yes, add to  "treatment plan and individual abuse prevention plan.  6) Review and evaluation of the individual abuse prevention plan: Current IAPP for this program is adequate for this client.    ASAM Risk Rating:    Dimension 1-0 Patient reports no withdrawal symptomology art this time.      Dimension 2-0 Patient continues to reports no biomedical issues or concerns at this time.      Dimension 3-2 Patient continues to gain insight regarding how his substance abuse affects his mental and emotional well-being. Patient continues to report that talking to his peers is helpful  In managing his emotional state. Patient completed his \"Anxiety Disorder and Substance Abuse\" assignment and presented in group. Patient also completed his \"Depression and Substance Abuse\" assignment and presented in group. Patient attended a Spiritual Care group session facilitated by Bita Burkett. Patient reports no suicidal ideation at this time.      Dimension 4-1 Patient continues to work on his internal motivation for change. Patient attends groups and lectures on time and offers meaningful feedback to his peers regarding homework assignments and topics for group discussion. Patient completed his \"Drug Use History\", \"5 Years Sober vs 5 Years Using\" and \"First Step\"  assignments and presented all in group      Dimension 5-4 Patient continues to work on gaining insight regarding his triggers, cues and early warning signs for relapse. Patient reports no cravings this week. Patient continues to read from the AA Big Book to gain knowledge and awareness. Patient completed his ''Resentments\" assignment and presented in group.       Dimension 6-3 Patient reports attending 12 Step meetings this week in compliance with his treatment plan. Patient will continue to work in compliance with his probations officers and manage his legal obligations while in treatment at . Patient will continue to work on developing a sober support network and will work with " "counselors and  to devise an aftercare treatment protocol. Patient also attended a 'Personal Relationship Workshop\" this past weekend.    Guide to C-SSRS Risk Ratings   NO IDEATION:  with no active thoughts IDEATION: with a wish to die. IDEATION: with active thoughts. Risk Ratings   If Yes No No 0 - Very Low Risk   If NA Yes No 1 - Low Risk   If NA Yes Yes 2 - Low/moderate risk   IDEATION: associated thoughts of methods without intent or plan INTENT: Intent to follow through on suicide PLAN: Plan to follow through on suicide Risk Ratings cont...   If Yes No No 3 - Moderate Risk   If Yes Yes No 4 - High Risk   If Yes Yes Yes 5 - High Risk   The patient's ADDITIONAL RISK FACTORS and lack of PROTECTIVE FACTORS may increase their overall suicide risk ratings.          Patient's current risk rating:  Past 24 hours: 0. - Very Low     Any changes in Vulnerable Adult Status?  No  If yes, add to treatment plan and individual abuse prevention plan.    Family Involvement:   Mom and Dad    Data:   offered feedback client did participate       Intervention:   Aftercare planning  Behavior modification  Cognitive Behavioral Therapy  Counselor feedback  Education  Emotional management  Group feedback  Motivational Enhancement Therapy  Relapse prevention  Twelve Step facilitation  Client & counselor reviewed and signed ISP & assessment summary  Mental health education       Assessment:   Stages of Change Model  Contemplation    Appears/Sounds:  Cooperative       Plan:  Focus on recovery environment  Monitor emotional/physical health  Continue group therapy  Attend AA/NA meetings when available    [employer]        "

## 2021-07-20 NOTE — GROUP NOTE
Group Therapy Documentation    PATIENT'S NAME: Martín Shoemaker  MRN:   [id number]  :   1990  ACCT. NUMBER: [id number]  DATE OF SERVICE: 21  START TIME: 12:30 PM  END TIME:  2:30 PM  FACILITATOR(S): Juan Manuel Foster LADC  TOPIC: BEH Group Therapy  Number of patients attending the group:  5  Group Length:  2 Hours    Group Therapy Type: Health and wellbeing     Summary of Group / Topics Discussed:    Balanced lifestyle      Group Attendance:  Attended group session    Patient's response to the group topic/interactions:  cooperative with task    Patient appeared to be Actively participating.        Client specific details:  Martín participated and interacted appropriately with peers and staff in PM group. No triggers to use noted or discussed.

## 2021-07-20 NOTE — GROUP NOTE
"Group Therapy Documentation    PATIENT'S NAME: Martín Shoemaker  MRN:   [id number]  :   1990  ACCT. NUMBER: [id number]  DATE OF SERVICE: 21  START TIME:  9:00 AM  END TIME: 11:00 AM  FACILITATOR(S): Svetlana Seals  TOPIC: BEH Group Therapy  Number of patients attending the group:  5  Group Length:  2 Hours    Group Therapy Type: Recovery strategies, Emotion processing, Daily living/independence skills, and Health and wellbeing     Summary of Group / Topics Discussed:    Recovery Principles, Sober coping skills, Relationship/socialization, Balanced lifestyle, Relapse prevention, and Self-care activities      Group Attendance:  Attended group session    Patient's response to the group topic/interactions:  cooperative with task    Patient appeared to be Actively participating, Attentive and Engaged.        Client specific details:  Patient completed his 'Drug Use History\" assignment and presented in group. Patient make it clear that he must stop drinking if he wants consequences to decrease and blessings to grow.      "

## 2021-07-21 ENCOUNTER — HOSPITAL ENCOUNTER (OUTPATIENT)
Dept: BEHAVIORAL HEALTH | Facility: CLINIC | Age: 31
End: 2021-07-21
Attending: FAMILY MEDICINE
Payer: COMMERCIAL

## 2021-07-21 VITALS — TEMPERATURE: 97.3 F | OXYGEN SATURATION: 98 %

## 2021-07-21 PROCEDURE — 1002N00001 HC LODGING PLUS FACILITY CHARGE ADULT

## 2021-07-21 PROCEDURE — H2035 A/D TX PROGRAM, PER HOUR: HCPCS | Mod: HQ

## 2021-07-21 NOTE — GROUP NOTE
Group Therapy Documentation    PATIENT'S NAME: Martín Shoemaker  MRN:   [id number]  :   1990  ACCT. NUMBER: [id number]  DATE OF SERVICE: 21  START TIME: 12:30 PM  END TIME:  2:30 PM  FACILITATOR(S): Juan Manuel Foster LADC  TOPIC: BEH Group Therapy  Number of patients attending the group:  5  Group Length:  2 Hours    Group Therapy Type: Daily living/independence skills    Summary of Group / Topics Discussed:    Balanced lifestyle      Group Attendance:  Attended group session    Patient's response to the group topic/interactions:  cooperative with task    Patient appeared to be Actively participating.        Client specific details:  Martín participated and interacted appropriately with peers and staff in PM group. No triggers to use noted or discussed.

## 2021-07-21 NOTE — GROUP NOTE
"Group Therapy Documentation    PATIENT'S NAME: Martín Shoemaker  MRN:   [id number]  :   1990  ACCT. NUMBER: [id number]  DATE OF SERVICE: 21  START TIME: 10:00 AM  END TIME: 11:30 AM  FACILITATOR(S): Svetlana Seals  TOPIC: BEH Group Therapy  Number of patients attending the group:  5  Group Length:  2 Hours    Group Therapy Type: Recovery strategies, Emotion processing, and Health and wellbeing     Summary of Group / Topics Discussed:    Recovery Principles, Sober coping skills, Relationship/socialization, Balanced lifestyle, Relapse prevention, and Self-care activities      Group Attendance:  Attended group session    Patient's response to the group topic/interactions:  cooperative with task    Patient appeared to be Actively participating, Attentive and Engaged.        Client specific details:  Patient was completed his \"First Step\" assignment and presented in group. Patient was honest and thorough in his self assessment and effects of chronic us on all aspects of his life.      "

## 2021-07-21 NOTE — GROUP NOTE
Psychoeducation Group Documentation    PATIENT'S NAME: Martín Shoemaker  MRN:   [id number]  :   1990  ACCT. NUMBER: [id number]  DATE OF SERVICE: 21  START TIME:  8:30 AM  END TIME:  9:30 AM  FACILITATOR(S): Karan Graham LADC; Magno Bueno LADC  TOPIC: BEH Pyschoeducation  Number of patients attending the group:  5  Group Length:  1 Hours    Skills Group Therapy Type: Emotion regulation skills    Summary of Group / Topics Discussed:    Balanced lifestyle skills          Group Attendance:  Attended group session    Patient's response to the group topic/interactions:  cooperative with task    Patient appeared to be Attentive.         Client specific details:  Martín gave appropriate feedback..

## 2021-07-22 ENCOUNTER — HOSPITAL ENCOUNTER (OUTPATIENT)
Dept: BEHAVIORAL HEALTH | Facility: CLINIC | Age: 31
End: 2021-07-22
Attending: FAMILY MEDICINE
Payer: COMMERCIAL

## 2021-07-22 VITALS — OXYGEN SATURATION: 98 % | TEMPERATURE: 97.9 F

## 2021-07-22 PROCEDURE — 1002N00001 HC LODGING PLUS FACILITY CHARGE ADULT

## 2021-07-22 PROCEDURE — H2035 A/D TX PROGRAM, PER HOUR: HCPCS | Mod: HQ

## 2021-07-22 NOTE — GROUP NOTE
Psychoeducation Group Documentation    PATIENT'S NAME: Martín Shoemaker  MRN:   [id number]  :   1990  [id number]  DATE OF SERVICE: 21  START TIME:  3:00 PM  END TIME:  4:00 PM  FACILITATOR(S): Eladio Werner LADC; Arlene Chung LADC  TOPIC: BEH Pyschoeducation  Number of patients attending the group:  24  Group Length:  1 Hours    Skills Group Therapy Type: Disease of addiction    Summary of Group / Topics Discussed:    Dr. Lizzeth gibson on brain development          Group Attendance:  Attended group session    Patient's response to the group topic/interactions:  cooperative with task    Patient appeared to be Attentive and Engaged.         Client specific details:  .

## 2021-07-22 NOTE — GROUP NOTE
"Group Therapy Documentation    PATIENT'S NAME: Martín Shoemaker  MRN:   [id number]  :   1990  ACCT. NUMBER: [id number]  DATE OF SERVICE: 21  START TIME:  9:00 AM  END TIME: 11:00 AM  FACILITATOR(S): Eladio Werner LADC  TOPIC: BEH Group Therapy  Number of patients attending the group:  4  Group Length:  2 Hours    Group Therapy Type: Recovery strategies    Summary of Group / Topics Discussed:    Recovery Principles, Relationship/socialization, Balanced lifestyle, and Emotions/expression      Group Attendance:  Attended group session    Patient's response to the group topic/interactions:  cooperative with task, discussed personal experience with topic, expressed understanding of topic and gave appropriate feedback to peers    Patient appeared to be Actively participating, Attentive and Engaged.        Client specific details:  Patient was an active participant throughout the group. Patient shared his \"Grief and Loss\" assignment. Patient identifies with significant guilt and struggles with attempts of self forgiveness.      "

## 2021-07-23 ENCOUNTER — HOSPITAL ENCOUNTER (OUTPATIENT)
Dept: BEHAVIORAL HEALTH | Facility: CLINIC | Age: 31
End: 2021-07-23
Attending: FAMILY MEDICINE
Payer: COMMERCIAL

## 2021-07-23 VITALS — OXYGEN SATURATION: 98 % | TEMPERATURE: 97.2 F

## 2021-07-23 PROCEDURE — H2035 A/D TX PROGRAM, PER HOUR: HCPCS | Mod: HQ

## 2021-07-23 PROCEDURE — 1002N00001 HC LODGING PLUS FACILITY CHARGE ADULT

## 2021-07-23 NOTE — GROUP NOTE
"Group Therapy Documentation    PATIENT'S NAME: Martín Shoemaker  MRN:   [id number]  :   1990  ACCT. NUMBER: [id number]  DATE OF SERVICE: 21  START TIME:  9:00 AM  END TIME: 11:00 AM  FACILITATOR(S): Eladio Werner LADC  TOPIC: BEH Group Therapy  Number of patients attending the group: 4  Group Length:  2 Hours    Group Therapy Type: Recovery strategies    Summary of Group / Topics Discussed:    Recovery Principles, Sober coping skills, and Cognitive behavioral therapy skills      Group Attendance:  Attended group session    Patient's response to the group topic/interactions:  cooperative with task, discussed personal experience with topic, expressed readiness to alter behaviors, expressed understanding of topic and gave appropriate feedback to peers    Patient appeared to be Actively participating, Attentive and Engaged.        Client specific details:  Patient presented the \"Guilt & Shame\" assignment. Patient identifies guilt and regret as mainstays of his self image. Patient acknowledges his inability/refusal to embrace self forgiveness.       "

## 2021-07-23 NOTE — ADDENDUM NOTE
Encounter addended by: Juan Manuel Foster LADC on: 7/23/2021 8:18 AM   Actions taken: Charge Capture section accepted

## 2021-07-23 NOTE — GROUP NOTE
Group Therapy Documentation    PATIENT'S NAME: Martín Shoemaker  MRN:   [id number]  :   1990  ACCT. NUMBER: [id number]  DATE OF SERVICE: 21  START TIME: 12:30 PM  END TIME:  2:30 PM  FACILITATOR(S): Juan Manuel Foster LADC  TOPIC: BEH Group Therapy  Number of patients attending the group:  5  Group Length:  2 Hours    Group Therapy Type: Emotion processing    Summary of Group / Topics Discussed:    Self-care activities      Group Attendance:  Attended group session    Patient's response to the group topic/interactions:  cooperative with task    Patient appeared to be Actively participating.        Client specific details:  Martín participated and interacted appropriately with peers and staff in PM group. No triggers to use noted or discussed.

## 2021-07-24 ENCOUNTER — HOSPITAL ENCOUNTER (OUTPATIENT)
Dept: BEHAVIORAL HEALTH | Facility: CLINIC | Age: 31
End: 2021-07-24
Attending: FAMILY MEDICINE
Payer: COMMERCIAL

## 2021-07-24 VITALS — TEMPERATURE: 97.3 F | OXYGEN SATURATION: 98 %

## 2021-07-24 PROCEDURE — H2035 A/D TX PROGRAM, PER HOUR: HCPCS | Mod: HQ

## 2021-07-24 PROCEDURE — 1002N00001 HC LODGING PLUS FACILITY CHARGE ADULT

## 2021-07-24 NOTE — GROUP NOTE
Group Therapy Documentation    PATIENT'S NAME: Martín Shoemaker  MRN:   [id number]  :   1990  ACCT. NUMBER: [id number]  DATE OF SERVICE: 21  START TIME: 12:30 PM  END TIME:  2:30 PM  FACILITATOR(S): Svetlana Seals  TOPIC: BEH Group Therapy  Number of patients attending the group:  4  Group Length:  2 Hours    Group Therapy Type: Recovery strategies    Summary of Group / Topics Discussed:    Relapse prevention      Group Attendance:  Attended group session    Patient's response to the group topic/interactions:  cooperative with task    Patient appeared to be Actively participating, Attentive and Engaged.        Client specific details:  Patient was attentive and participative during group session.

## 2021-07-24 NOTE — GROUP NOTE
Group Therapy Documentation    PATIENT'S NAME: Martín Shoemaker  MRN:   [id number]  :   1990  [id number]  DATE OF SERVICE: 21  START TIME:  9:00 AM  END TIME: 11:30 AM  FACILITATOR(S): Anay Soto LADC  TOPIC: BEH Group Therapy  Number of patients attending the group:  4  Group Length:  2 Hours    Group Therapy Type: Recovery strategies    Summary of Group / Topics Discussed:    Relapse prevention      Group Attendance:  Attended group session    Patient's response to the group topic/interactions:  cooperative with task    Patient appeared to be Engaged.        Client specific details:  Martín participated in the relapse prevention workshop. He was engaged in a discussion on social anxiety and CINTHIA.

## 2021-07-24 NOTE — GROUP NOTE
Group Therapy Documentation    PATIENT'S NAME: Martín Shoemaker  MRN:   [id number]  :   1990  [id number]  DATE OF SERVICE: 21  START TIME:  9:00 AM  END TIME: 11:30 AM  FACILITATOR(S): Anay Soto LADC  TOPIC: BEH Group Therapy  Number of patients attending the group:  4  Group Length:  2 Hours    Group Therapy Type: Recovery strategies    Summary of Group / Topics Discussed:    Relapse prevention      Group Attendance:  Attended group session    Patient's response to the group topic/interactions:  cooperative with task    Patient appeared to be Engaged.        Client specific details:  Martín participated in the relapse prevention workshop. He was engaged in a discussion on social anxiety and CINTHIA.

## 2021-07-24 NOTE — PROGRESS NOTES
Name: Martín Shoemaker  Date: 7/24/2021  Medical Record: [id number]  Envelope Number: [id number]  List of Contents (List each item separately in new row):     Vitamin B-1 100 mg tablet, Sertraline 100 mg tablet, Olanzapine 10 mg tablet, Pantoprazole 40 mg tablet, Trazodone 50 mg tablet, Folic Acid 1 mg tabs.    Admission:  I am responsible for any personal items that are not sent to the safe or pharmacy.  [facility] is not responsible for loss, theft or damage of any property in my possession.    Patient Signature:  ___________________________________________       Date/Time:__________________________    Staff Signature: __________________________________       Date/Time:__________________________    [facility] Staff person, if patient is unable/unwilling to sign:    ___________________________________________________       Date/Time: __________________________    Discharge:  [facility] has returned all of my personal belongings:    Patient Signature: ________________________________________     Date/Time: ____________________________________    Staff Signature: ______________________________________     Date/Time:_____________________________________

## 2021-07-25 ENCOUNTER — HOSPITAL ENCOUNTER (OUTPATIENT)
Dept: BEHAVIORAL HEALTH | Facility: CLINIC | Age: 31
End: 2021-07-25
Attending: FAMILY MEDICINE
Payer: COMMERCIAL

## 2021-07-25 VITALS — TEMPERATURE: 97.2 F | OXYGEN SATURATION: 98 %

## 2021-07-25 PROCEDURE — H2035 A/D TX PROGRAM, PER HOUR: HCPCS | Mod: HQ

## 2021-07-25 PROCEDURE — 1002N00001 HC LODGING PLUS FACILITY CHARGE ADULT

## 2021-07-25 NOTE — GROUP NOTE
Psychoeducation Group Documentation    PATIENT'S NAME: Martín Shoemaker  MRN:   [id number]  :   1990  ACCT. NUMBER: [id number]  DATE OF SERVICE: 21  START TIME:  8:45 AM  END TIME: 10:30 AM  FACILITATOR(S): Nancy Rothman RN; Arlene Chung LADC  TOPIC: BEH Pyschoeducation  Number of patients attending the group: 21  Group Length:  2 Hours    Skills Group Therapy Type: Daily living/independence skills    Summary of Group / Topics Discussed:    Self-care    Group Attendance: Attended group session    Patients response to the group topic/interactions: cooperative with task,     Patient appeared to be: Attentive and Engaged    Client specific details: Patient attended self-care workshop, presented by lodging plus nurse, Nancy Rothman RN. Patient was educated about diets, eating disorder, vitamins/supplements, and sleep. Patient participated in all activities, including meditation.

## 2021-07-25 NOTE — GROUP NOTE
Group Therapy Documentation    PATIENT'S NAME: Martín Shoemaker  MRN:   [id number]  :   1990  ACCT. NUMBER: [id number]  DATE OF SERVICE: 21  START TIME: 12:30 PM  END TIME:  1:30 PM  FACILITATOR(S): Svetlana Seals  TOPIC: BEH Group Therapy  Number of patients attending the group:  3  Group Length:  1 Hour    Group Therapy Type: Health and wellbeing     Summary of Group / Topics Discussed:    Self-care activities      Group Attendance:  Attended group session    Patient's response to the group topic/interactions:  cooperative with task    Patient appeared to be Actively participating, Attentive and Engaged.        Client specific details:  Patient was attentive and participative during smoking cessation lecture.

## 2021-07-25 NOTE — PROGRESS NOTES
Nursing Discharge Planning Meeting    Writer completed discharge planning meeting with patient. Discharge is planned for 7/30/21    Discussed appropriate follow up care to manage CINTHIA, MI and MEdical and to obtain medication refills. Patient given a copy of their current medications for reference. Questions were answered at this time and the patient verbalized an understanding of the post-discharge follow up plan.    Patient to schedule an appointment with their PCP /OR/ Follow up appointment schedule for NP clinic [phone] [place] information   Continue to support patient in discharge planning as needed to assure appropriate continuity of care.     Tobacco Cessation  Patient participated in the nicotine replacement therapy for tobacco cessation or reduction during their treatment programming: Yes Decreased tobacco use with NRT products       The patient was provided with community resources for follow-up to continue tobacco cessation support once in the community. Also the patient was encouraged to discuss their tobacco cessation efforts with the primary care provider.

## 2021-07-26 ENCOUNTER — HOSPITAL ENCOUNTER (OUTPATIENT)
Dept: BEHAVIORAL HEALTH | Facility: CLINIC | Age: 31
End: 2021-07-26
Attending: FAMILY MEDICINE
Payer: COMMERCIAL

## 2021-07-26 VITALS — OXYGEN SATURATION: 98 % | TEMPERATURE: 97.4 F

## 2021-07-26 PROCEDURE — H2035 A/D TX PROGRAM, PER HOUR: HCPCS | Mod: HQ

## 2021-07-26 PROCEDURE — 1002N00001 HC LODGING PLUS FACILITY CHARGE ADULT

## 2021-07-26 NOTE — GROUP NOTE
Group Therapy Documentation    PATIENT'S NAME: Martín Shoemaker  MRN:   [id number]  :   1990  ACCT. NUMBER: [id number]  DATE OF SERVICE: 21  START TIME: 12:30 PM  END TIME:  2:30 PM  FACILITATOR(S): Juan Manuel Foster LADC  TOPIC: BEH Group Therapy  Number of patients attending the group:  3  Group Length:  2 Hours    Group Therapy Type: Recovery strategies    Summary of Group / Topics Discussed:    Recovery Principles      Group Attendance:  Attended group session    Patient's response to the group topic/interactions:  cooperative with task    Patient appeared to be Actively participating.        Client specific details:  Martín participated and interacted appropriately with peers and staff in PM group. No triggers to use noted or discussed.

## 2021-07-26 NOTE — GROUP NOTE
Group Therapy Documentation    PATIENT'S NAME: Martín Shoemaker  MRN:   [id number]  :   1990  ACCT. NUMBER: [id number]  DATE OF SERVICE: 21  START TIME:  9:00 AM  END TIME: 11:00 AM  FACILITATOR(S): Cole De La Torre LADC  TOPIC: BEH Group Therapy  Number of patients attending the group:  4  Group Length:  2 Hours    Group Therapy Type: Recovery strategies    Summary of Group / Topics Discussed:    Recovery Principles      Group Attendance:  Attended group session    Patient's response to the group topic/interactions:  cooperative with task    Patient appeared to be Attentive.        Client specific details:  Martín attended AM group. He took part in a discussion regarding self esteem. Patient checked in and talked about what's giving them hope. Patient took part in a guided mindfulness exercise, and took part in a exploring values exercise.

## 2021-07-27 ENCOUNTER — HOSPITAL ENCOUNTER (OUTPATIENT)
Dept: BEHAVIORAL HEALTH | Facility: CLINIC | Age: 31
End: 2021-07-27
Attending: FAMILY MEDICINE
Payer: COMMERCIAL

## 2021-07-27 VITALS — OXYGEN SATURATION: 97 % | TEMPERATURE: 97.1 F

## 2021-07-27 PROCEDURE — 1002N00001 HC LODGING PLUS FACILITY CHARGE ADULT

## 2021-07-27 PROCEDURE — H2035 A/D TX PROGRAM, PER HOUR: HCPCS | Mod: HQ

## 2021-07-27 NOTE — GROUP NOTE
Group Therapy Documentation    PATIENT'S NAME: Martín Shoemaker  MRN:   [id number]  :   1990  ACCT. NUMBER: [id number]  DATE OF SERVICE: 21  START TIME: 12:30 PM  END TIME:  2:30 PM  FACILITATOR(S): Juan Manuel Foster LADC  TOPIC: BEH Group Therapy  Number of patients attending the group:  4  Group Length:  2 Hours    Group Therapy Type: Emotion processing    Summary of Group / Topics Discussed:    Sober coping skills      Group Attendance:  Attended group session    Patient's response to the group topic/interactions:  cooperative with task    Patient appeared to be Actively participating.        Client specific details:  Martín participated and interacted appropriately with peers and staff in PM group. No triggers to use noted or discussed.

## 2021-07-27 NOTE — ADDENDUM NOTE
Encounter addended by: Eladio Werner LADC on: 7/27/2021 9:40 AM   Actions taken: Clinical Note Signed

## 2021-07-27 NOTE — GROUP NOTE
Group Therapy Documentation    PATIENT'S NAME: Martín Shoemaker  MRN:   [id number]  :   1990  ACCT. NUMBER: [id number]  DATE OF SERVICE: 21  START TIME:  3:00 PM  END TIME:  4:00 PM  FACILITATOR(S): Kaylie Quintanilla LADC; Svetlana Seals; [facility]adio Werner [facility]  TOPIC: BEH Group Therapy  Number of patients attending the group:  22  Group Length:  1 Hours    Group Therapy Type: Recovery strategies    Summary of Group / Topics Discussed:    Emotions/expression, Leisure explorations/use of leisure time, and Self-care activities      Group Attendance:  Attended group session    Patient's response to the group topic/interactions:  cooperative with task and discussed personal experience with topic    Patient appeared to be Actively participating, Attentive and Engaged.        Client specific details:  .

## 2021-07-27 NOTE — GROUP NOTE
Group Therapy Documentation    PATIENT'S NAME: Martín Shoemaker  MRN:   [id number]  :   1990  ACCT. NUMBER: [id number]  DATE OF SERVICE: 21  START TIME:  9:00 AM  END TIME: 11:00 AM  FACILITATOR(S): Cole De La Torre LADC; [place] LADC  TOPIC: BEH Group Therapy  Number of patients attending the group:  4  Group Length:  2 Hours    Group Therapy Type: Recovery strategies    Summary of Group / Topics Discussed:    Recovery Principles      Group Attendance:  Attended group session    Patient's response to the group topic/interactions:  cooperative with task    Patient appeared to be Attentive.        Client specific details:  Martín attended AM group. He took part in a discussion on past pivotal moments. Patient also checked in, and took part in a discussion on strengths.

## 2021-07-28 ENCOUNTER — HOSPITAL ENCOUNTER (OUTPATIENT)
Dept: BEHAVIORAL HEALTH | Facility: CLINIC | Age: 31
End: 2021-07-28
Attending: FAMILY MEDICINE
Payer: COMMERCIAL

## 2021-07-28 VITALS — OXYGEN SATURATION: 97 % | TEMPERATURE: 97.6 F

## 2021-07-28 PROCEDURE — H2035 A/D TX PROGRAM, PER HOUR: HCPCS | Mod: HQ

## 2021-07-28 PROCEDURE — 1002N00001 HC LODGING PLUS FACILITY CHARGE ADULT

## 2021-07-28 NOTE — GROUP NOTE
Group Therapy Documentation    PATIENT'S NAME: Martín Shoemaker  MRN:   [id number]  :   1990  [id number]  DATE OF SERVICE: 21  START TIME:  8:30 AM  END TIME:  9:30 AM  FACILITATOR(S): Cole De La Torre LADC  TOPIC: BEH Group Therapy  Number of patients attending the group:  5  Group Length:  1 Hours    Group Therapy Type: Recovery strategies and Emotion processing    Summary of Group / Topics Discussed:    Recovery Principles      Group Attendance:  Attended group session    Patient's response to the group topic/interactions:  cooperative with task    Patient appeared to be Attentive.        Client specific details:  Sixto gave appropriate feedback.

## 2021-07-28 NOTE — GROUP NOTE
Group Therapy Documentation    PATIENT'S NAME: Martín Shoemaker  MRN:   [id number]  :   1990  ACCT. NUMBER: [id number]  DATE OF SERVICE: 21  START TIME: 12:30 PM  END TIME:  2:30 PM  FACILITATOR(S): Juan Manuel Foster LADC  TOPIC: BEH Group Therapy  Number of patients attending the group:  5  Group Length:  2 Hours    Group Therapy Type: Health and wellbeing     Summary of Group / Topics Discussed:    Disease of addiction      Group Attendance:  Attended group session    Patient's response to the group topic/interactions:  cooperative with task    Patient appeared to be Actively participating.        Client specific details:  Martín participated and interacted appropriately with peers and staff in PM group. No triggers to use noted or discussed.

## 2021-07-28 NOTE — GROUP NOTE
Group Therapy Documentation    PATIENT'S NAME: Martín Shoemaker  MRN:   [id number]  :   1990  ACCT. NUMBER: [id number]  DATE OF SERVICE: 21  START TIME:  8:30 AM  END TIME:  9:30 AM  FACILITATOR(S): Juan Manuel Foster LADC  TOPIC: BEH Group Therapy  Number of patients attending the group:  5  Group Length:  2 Hours    Group Therapy Type: Recovery strategies    Summary of Group / Topics Discussed:    Recovery Principles      Group Attendance:  Attended group session    Patient's response to the group topic/interactions:  cooperative with task    Patient appeared to be Actively participating.        Client specific details:  Martín participated and interacted appropriately with peers and staff in PM group. No triggers to use noted or discussed.

## 2021-07-28 NOTE — PROGRESS NOTES
[address]  [place] [address]              Martín Shoemaker, 1990, was admitted for evaluation/treatment of chemical dependency at [facility]. This person took part in this program:     ______ The Inpatient Program   ______ The Outpatient Program   ___X___ The Lodging Plus Program   ______ Lodging Day Outpatient         Date admitted: 7/2/2021  Date discharged: 7/29/2021     Type of discharge:   ___X___ Satisfactory - completed evaluation / treatment   ________ Discharged without completing   ______ Behavioral discharge   ______ Transferred to another chemical dependency program   ______ Transferred to another type of service   ______ Left against medical advice (AMA) / Eloped               Counselor:  ANTONELLA Watson and ANTONELLA Parikh      Date: 7/29/2021            Time: 7:08 AM

## 2021-07-28 NOTE — GROUP NOTE
Group Therapy Documentation    PATIENT'S NAME: Martín Shoemaker  MRN:   [id number]  :   1990  [id number]  DATE OF SERVICE: 21  START TIME:  9:45 AM  END TIME: 11:15 AM  FACILITATOR(S): Cole De La Torre LADC  TOPIC: BEH Group Therapy  Number of patients attending the group:  5  Group Length:  1.5 Hours    Group Therapy Type: Recovery strategies    Summary of Group / Topics Discussed:    Recovery Principles      Group Attendance:  Attended group session    Patient's response to the group topic/interactions:  cooperative with task    Patient appeared to be Attentive.        Client specific details:  Martín attended AM group. Patient took part in a group discussion, mindfulness exercise, and presented an appt on Guilt and Shame.

## 2021-07-28 NOTE — PROGRESS NOTES
[facility] Discharge Summary/Instructions     Patient: Martín Shoemaker  MRN: [id number]   : 1990 Age: 30 year old Sex: male   -  Focus of Treatment / Discharge Recommendations    Personal Safety/ Management of Symptoms    * Follow your safety plan.  Report increased symptoms to your care team and /or go to the nearest Emergency Department.    * Call crisis lines as needed    [facility] [place] [phone]                [facility] [phone]  [facility] [phone]              [facility] [phone]  [facility] [phone]              [facility] [phone]  [facility] [phone]          National Suicide Prevention [phone]  [facility] [phone]            Suicide Prevention [phone]  [facility] [phone]    Abstinence/Relapse Prevention  * Take all medicines as directed.  Carry a current list of medicines with you.  * Use coping skills: exercise, reach out to sober and supportive friends, family, and peers. Practice relaxation/mindfulness (ie: YouTube Daily Calm, etc). Attend meetings and work with a sponsor.   * Do not use illicit (street) drugs, controlled substances (narcotics) or alcohol.    Develop/Improve Independent Living/Socialization Skills: continue to build sober living skills    Community Resources/Supports and Discharge Planning:    Patient to schedule an appointment with their PCP /OR/ Follow up appointment schedule for NP clinic pt [address].    Attend Phase II group therapy starting 21. See attached information sheet.        Client Signature:_______________________   Date / Time:___________  Staff Signature:________________________   Date / Time:___________

## 2021-07-29 NOTE — ADDENDUM NOTE
Encounter addended by: Cole De La Torre [facility] on: 7/29/2021 8:28 AM   Actions taken: Clinical Note Signed, Episode deleted

## 2021-07-29 NOTE — PROGRESS NOTES
CHEMICAL DEPENDENCY DISCHARGE SUMMARY    PATIENT NAME:  Martín Shoemaker   :  1990     EVALUATION COUNSELOR: ANTONELLA Freeman  TREATMENT COUNSELORS: ESME Parikh,  Cole De La Torre  [facility]  REFERRAL SOURCE:  [facility]  PROGRAM:  [facility]  ADMISSION DATE: 21  DATE OF LAST SESSION: 21  DISCHARGE DATE: 21  ADMISSION DIAGNOSIS:    Alcohol Use Disorder-Severe, F10.20  Tobacco Use Disorder-Mild, Z72.00  Marijuana Use Disorder-Moderate, F12.20    DISCHARGE DIAGNOSIS:  Alcohol Use Disorder-Severe, F10.20  Tobacco Use Disorder-Mild, Z72.00  Marijuana Use Disorder-Moderate, F12.20    DISCHARGE STATUS: Patient discharged from [facility] treatment with staff approval.   LAST USE DATE: Patient reports his last use date for alcohol as , last use date for cannabis as 21.   DAYS OF TREATMENT COMPLETED:  Patient completed 27 days of treatment    PRESENTING INFORMATION:  Patient was assessed to be appropriate for [facility] treatment services in the Lodging Plus program.    SERVICES PROVIDED:  Services included assessment, treatment planning and education regarding chemical dependency, mental health, relationships, and relapse prevention.  The patient also participated in individual therapy, group therapy, recovery oriented workshops, spiritual care counseling, recovery skills training, and aftercare planning.    ISSUES ADDRESS IN TREATMENT:    DIMENSION 1 - ACUTE INTOXICATION/WITHDRAWAL POTENTIAL  ADMISSION RISK RATIN  DISCHARGE RISK RATIN  Patient entered into [facility] on 21. Throughout treatment patient denied any withdrawal symptoms that would interfere with full participation in treatment programming.     DIMENSION 2 - BIOMEDICAL COMPLICATIONS AND CONDITIONS  ADMISSION RISK RATIN  DISCHARGE RISK RATIN  Upon admissions patient denied any medical concerns that would interfere with full  "participation in treatment programming. Patient maintained medication compliance throughout treatment. Upon discharge patient appeared able to access medical aid as needed.     DIMENSION 3 - EMOTIONAL, BEHAVIORAL, COGNITIVE CONDITIONS AND COMPLICATIONS  ADMISSION RISK RATIN  DISCHARGE RISK RATIN  Upon admissions patient reported a mental health diagnosis of anxiety and depression. Upon admissions patient was given a suicidal risk screening. Patient was rated as \"low risk\". Patient met with staff therapist Sydnie Rodriguez for individual sessions. Patient attended weekly Spiritual Care group sessions facilitated by Bita Burkett.Upon discharge patient denied any suicidal thoughts or ideations. Patient's initial clinical global impression was 5, and their discharge cgi was 4. Patient completed the assignments:   Anxiety Disorder and Substance Abuse  and  Depression and Substance Abuse . Patient read the book and gave a reflection on  \"Reducing Anxiety\".     DIMENSION 4 - READINESS FOR CHANGE  ADMISSION RISK RATIN  DISCHARGE RISK RATIN  Patient appeared to gain motivation for recovery during treatment. Patient completed and presented:   Drug Use History ,  5 and 5\" and,  First Step\". Patient appears to be in the preparation/action Stages of Change.     DIMENSION 5 - RELAPSE, CONTINUED USE AND CONTINUED PROBLEM POTENTIAL   ADMISSION RISK RATIN  DISCHARGE RISK RATIN  Patient has a history of relapse. Patient attended several Relapse prevention weekend workshops and learned/reviewed their relapse triggers. Patient completed assignments on \"Grief and Loss\", \"Guilt and Shame.\"     DIMENSION 6 - RECOVERY ENVIRONMENT  ADMISSION RISK RATING: 3  DISCHARGE RISK RATIN  Pt attended daily sober support meetings while in treatment.  Patient attended several Relationships weekend workshops and learned about healthy/unhealthy relationships, assertive communication, boundaries, and " co-dependency.    STRENGTHS: Patient maintained a positive attitude while in treatment. Patient was an active participant in group therapy and was open for feedback from their counselors and peers. Patient appears motivated for recovery at this time and willing to incorporate positive changes into their  life.     LIVING ARRANGEMENTS AT DISCHARGE: Will live with parents for 2-3 weeks and then plans on returning home.     PROGNOSIS:  Prognosis for this patient is favorable at this time.      CONTINUING CARE RECOMMENDATIONS AND REFERRALS:    1.  Abstain from all mood-altering chemicals unless prescribed by a licensed medical provider, and take all medications as prescribed.  2.  Attend a minimum of three AA/NA/Sober support groups weekly in the community/online.  3.  Begin working with a sponsor and maintain regular contact with them.  4.  Begin Phase II on 8/12 and follow all recommendations.  5.  Continue to invest in building a sober support network.  6.  Continue to monitor and understand relapse triggers and stressors and implement, and continue to development, healthy coping skills.  7.  Obtain a mental health therapist and attend all scheduled sessions.   8. Attend all scheduled medical appointments.  9. Take medication as prescribed       This information has been disclosed to you from records protected by Federal confidentiality rules (42 CFR part 2). The Federal rules prohibit you from making any further disclosure of this information unless further disclosure is expressly permitted by the written consent of the person to whom it pertains or as otherwise permitted by 42 CFR part 2. A general authorization for the release of medical or other information is NOT sufficient for this purpose. The Federal rules restrict any use of the information to criminally investigate or prosecute any alcohol or drug abuse patient.       ANTONELLA Watson

## 2021-08-02 ENCOUNTER — HOSPITAL ENCOUNTER (EMERGENCY)
Facility: CLINIC | Age: 31
Discharge: HOME | End: 2021-08-02
Attending: FAMILY MEDICINE | Admitting: FAMILY MEDICINE
Payer: COMMERCIAL

## 2021-08-02 VITALS
OXYGEN SATURATION: 97 % | DIASTOLIC BLOOD PRESSURE: 114 MMHG | RESPIRATION RATE: 18 BRPM | HEART RATE: 117 BPM | SYSTOLIC BLOOD PRESSURE: 150 MMHG | TEMPERATURE: 97.6 F

## 2021-08-02 DIAGNOSIS — F10.229 ALCOHOL DEPENDENCE WITH INTOXICATION WITH COMPLICATION (H): ICD-10-CM

## 2021-08-02 LAB
ALCOHOL BREATH TEST: 0.35 (ref 0–0.01)
AMPHETAMINES UR QL SCN: ABNORMAL
BARBITURATES UR QL: ABNORMAL
BENZODIAZ UR QL: ABNORMAL
CANNABINOIDS UR QL SCN: ABNORMAL
COCAINE UR QL: ABNORMAL
OPIATES UR QL SCN: ABNORMAL

## 2021-08-02 PROCEDURE — 82075 ASSAY OF BREATH ETHANOL: CPT | Performed by: FAMILY MEDICINE

## 2021-08-02 PROCEDURE — 99283 EMERGENCY DEPT VISIT LOW MDM: CPT | Performed by: FAMILY MEDICINE

## 2021-08-02 PROCEDURE — 82075 ASSAY OF BREATH ETHANOL: CPT

## 2021-08-02 PROCEDURE — 250N000013 HC RX MED GY IP 250 OP 250 PS 637: Performed by: EMERGENCY MEDICINE

## 2021-08-02 PROCEDURE — 80307 DRUG TEST PRSMV CHEM ANLYZR: CPT | Performed by: FAMILY MEDICINE

## 2021-08-02 RX ORDER — GABAPENTIN 400 MG/1
400 CAPSULE ORAL 3 TIMES DAILY
Qty: 12 CAPSULE | Refills: 0 | Status: SHIPPED | OUTPATIENT
Start: 2021-08-02 | End: 2021-08-05

## 2021-08-02 RX ADMIN — NICOTINE POLACRILEX 4 MG: 2 GUM, CHEWING BUCCAL at 18:02

## 2021-08-02 NOTE — ED PROVIDER NOTES
[facility] EMERGENCY DEPARTMENT ([facility])   August 2, 2021 hallway 1  History     Chief Complaint   Patient presents with     Drug / Alcohol Assessment     just completed the 30 day program; got out thursday and drank thursday when he got out.      The history is provided by the patient, a parent and medical records.     Martín Shoemaker is a 30 year old male with prior history of depression, anxiety and severe alcohol use disorder prior alcohol withdrawal seizures who presents seeking alcohol detox.  Patient was brought in by father.  Patient had a recent period of sobriety with detox on 7/1 followed by [facility].  He was at [facility] from 7/2-7/29/2021.  He relapsed as soon as he got out.  His family found out that he had been drinking daily and brought him here for further evaluation.  Family is at a loss, states that they have brought him to multiple facilities for treatment. Father states that patient did like the facilities here which prompted bringing patient back here for further evaluation.. When he stops drinking he develops nausea, vomiting, chills, sweats. He notes prior history of alcohol withdrawal seizures, father states it has been a long time since his last one.  He is on medical marijuana per his report.     PAST MEDICAL HISTORY:   Past Medical History:   Diagnosis Date     Depressive disorder      Hypertension     pt reports he takes meds for HTN     Substance abuse (H)      Ulcer, gastric, acute        PAST SURGICAL HISTORY:   Past Surgical History:   Procedure Laterality Date     ADENOIDECTOMY       ENDOSCOPY       TONSILLECTOMY Bilateral        Past medical history, past surgical history, medications, and allergies were reviewed with the patient. Additional pertinent items: None    FAMILY HISTORY:   Family History   Problem Relation Age of Onset     Hypertension Mother      Cerebrovascular Disease Maternal Grandfather      Cerebrovascular Disease Paternal Grandfather         SOCIAL HISTORY:   Social History     Tobacco Use     Smoking status: Light Tobacco Smoker     Smokeless tobacco: Current User     Types: Chew   Substance Use Topics     Alcohol use: Not Currently     Comment: binge, relapsed a few months ago     Social history was reviewed with the patient. Additional pertinent items: None      Patient's Medications   New Prescriptions    GABAPENTIN (NEURONTIN) 400 MG CAPSULE    Take 1 capsule (400 mg) by mouth 3 times daily For 3 days, then twice daily for 1 day, then once daily on day 5, then stop   Previous Medications    DISULFIRAM (ANTABUSE) 250 MG TABLET    Take 1 tablet (250 mg) by mouth daily    FERROUS SULFATE (FE TABS) 325 (65 FE) MG EC TABLET    Take 1 tablet (325 mg) by mouth daily    FOLIC ACID (FOLVITE) 1 MG TABLET    Take 1 tablet (1 mg) by mouth daily    HYDROXYZINE (ATARAX) 25 MG TABLET    Take 1-2 tablets (25-50 mg) by mouth 3 times daily as needed for anxiety    MULTIVITAMIN, THERAPEUTIC (THERA-VIT) TABS TABLET    Take 1 tablet by mouth daily    NICOTINE (NICORETTE) 2 MG GUM    Place 1-2 each (2-4 mg) inside cheek every hour as needed for other (nicotine withdrawal symptoms)    OLANZAPINE (ZYPREXA) 10 MG TABLET    Take 1 tablet (10 mg) by mouth At Bedtime    PANTOPRAZOLE (PROTONIX) 40 MG EC TABLET    Take 1 tablet (40 mg) by mouth every morning (before breakfast)    SERTRALINE (ZOLOFT) 100 MG TABLET    Take 1 tablet (100 mg) by mouth daily    THIAMINE (B-1) 100 MG TABLET    Take 1 tablet (100 mg) by mouth daily    TRAZODONE (DESYREL) 50 MG TABLET    Take 1-2 tablets ( mg) by mouth nightly as needed for sleep   Modified Medications    No medications on file   Discontinued Medications    No medications on file        No Known Allergies     Review of Systems  A complete review of systems was performed with pertinent positives and negatives noted in the HPI, and all other systems negative.    Physical Exam   BP: (!) 163/89  Pulse: 117  Temp: 97.6  F (36.4   C)  Resp: 18  SpO2: 97 %      Physical Exam  Vitals and nursing note reviewed.   Constitutional:       General: He is not in acute distress.     Appearance: He is not diaphoretic.   HENT:      Head: Atraumatic.   Eyes:      General: No scleral icterus.     Pupils: Pupils are equal, round, and reactive to light.   Cardiovascular:      Heart sounds: Normal heart sounds.   Pulmonary:      Effort: No respiratory distress.      Breath sounds: Normal breath sounds.   Abdominal:      General: Bowel sounds are normal.      Palpations: Abdomen is soft.      Tenderness: There is no abdominal tenderness.   Musculoskeletal:         General: No tenderness.   Skin:     General: Skin is warm.      Findings: No rash.   Neurological:      General: No focal deficit present.      Mental Status: He is oriented to person, place, and time.      Comments: Slurred speech and staggering gait consistent with his markedly elevated alcohol level only   Psychiatric:         Attention and Perception: Attention normal.         Mood and Affect: Mood is depressed. Affect is tearful.         Speech: Speech is delayed and slurred.         Behavior: Behavior is cooperative.         Thought Content: Thought content is not paranoid or delusional. Thought content does not include homicidal or suicidal ideation.         ED Course        Procedures                      Results for orders placed or performed during the hospital encounter of 08/02/21 (from the past 24 hour(s))   Alcohol breath test POCT   Result Value Ref Range    Alcohol Breath Test 0.346 (A) 0.00 - 0.01   Urine Drugs of Abuse Screen    Narrative    The following orders were created for panel order Urine Drugs of Abuse Screen.  Procedure                               Abnormality         Status                     ---------                               -----------         ------                     Drug abuse screen 1 urin...[[id number]]                      In process                    Please view results for these tests on the individual orders.     Medications - No data to display          Assessments & Plan (with Medical Decision Making)   30-year-old male with a history of alcohol dependence who recently went through a 28-day treatment program here at [facility].  He was discharged 4 days ago.  He relapsed the next day and has not been drinking continuously for 3 days.  Is with alcohol level 0.346.  Is remorseful and obviously intoxicated.  Concerned about potential alcohol withdrawal symptoms as there is a remote history of alcohol withdrawal seizures.  He has no other medical or psychiatric concerns.  Unfortunately there are no detox beds available here, and the patient will refused to go to [address] [place] [address]. detox.  I attempted to get him a bed at [facility] and was told there were no beds there tonight either.  At this point patient will need to sober some in the ED.  His father and mother state they will pick him up and take him home tonight later and give him a sober place to stay with some supervision.  Contact his relapse counselor in the morning.  I will prescribe gabapentin to use for any withdrawal symptoms over the next 2 to 3 days.  Provided him with resources to check the status of detox bed availability.  He will need several hours of sobering in the ED before he is able to be released.  Patient and his parent agreed to this plan.  I will sign him out with a plan in place at shift change.    I have reviewed the nursing notes.    I have reviewed the findings, diagnosis, plan and need for follow up with the patient.    New Prescriptions    GABAPENTIN (NEURONTIN) 400 MG CAPSULE    Take 1 capsule (400 mg) by mouth 3 times daily For 3 days, then twice daily for 1 day, then once daily on day 5, then stop       Final diagnoses:   Alcohol dependence with intoxication with complication (H)       Maggi HINSON, am serving as a trained medical scribe to document services  personally performed by Pratik Blevins MD based on the provider's statements to me on August 2, 2021.  This document has been checked and approved by the attending provider.    I, Pratik Blevins MD, was physically present and have reviewed and verified the accuracy of this note documented by Maggi Graves, medical scribe.       Pratik Rodrigues    8/2/2021   [facility] EMERGENCY DEPARTMENT     Pratik Blevins MD  08/02/21 [id number]

## 2021-08-02 NOTE — DISCHARGE INSTRUCTIONS
To check the status of detox bed availability at [facility] call:  [phone]  To check the status of detox availability at [facility] call:  [phone]  To check the status of detox bed availability at [address] [place] [address] call:  [phone]  If you desire chemical dependency assessment or counseling, follow up with [facility]: [phone]

## 2021-08-05 ENCOUNTER — HOSPITAL ENCOUNTER (INPATIENT)
Facility: CLINIC | Age: 31
LOS: 3 days | Discharge: HOME | End: 2021-08-08
Attending: EMERGENCY MEDICINE | Admitting: PSYCHIATRY & NEUROLOGY
Payer: COMMERCIAL

## 2021-08-05 ENCOUNTER — TELEPHONE (OUTPATIENT)
Dept: BEHAVIORAL HEALTH | Facility: CLINIC | Age: 31
End: 2021-08-05

## 2021-08-05 DIAGNOSIS — F10.20 ALCOHOLISM (H): ICD-10-CM

## 2021-08-05 DIAGNOSIS — Z20.822 CONTACT WITH AND (SUSPECTED) EXPOSURE TO COVID-19: ICD-10-CM

## 2021-08-05 DIAGNOSIS — F10.220 ALCOHOL DEPENDENCE WITH UNCOMPLICATED INTOXICATION (H): ICD-10-CM

## 2021-08-05 DIAGNOSIS — F32.A ANXIETY AND DEPRESSION: ICD-10-CM

## 2021-08-05 DIAGNOSIS — F41.9 ANXIETY AND DEPRESSION: ICD-10-CM

## 2021-08-05 LAB
ALBUMIN SERPL-MCNC: 3.9 G/DL (ref 3.4–5)
ALCOHOL BREATH TEST: 0.21 (ref 0–0.01)
ALP SERPL-CCNC: 68 U/L (ref 40–150)
ALT SERPL W P-5'-P-CCNC: 46 U/L (ref 0–70)
AMPHETAMINES UR QL SCN: NORMAL
ANION GAP SERPL CALCULATED.3IONS-SCNC: 6 MMOL/L (ref 3–14)
AST SERPL W P-5'-P-CCNC: 34 U/L (ref 0–45)
BARBITURATES UR QL: NORMAL
BASOPHILS # BLD AUTO: 0 10E3/UL (ref 0–0.2)
BASOPHILS NFR BLD AUTO: 1 %
BENZODIAZ UR QL: NORMAL
BILIRUB SERPL-MCNC: 0.3 MG/DL (ref 0.2–1.3)
BUN SERPL-MCNC: 16 MG/DL (ref 7–30)
CALCIUM SERPL-MCNC: 8.1 MG/DL (ref 8.5–10.1)
CANNABINOIDS UR QL SCN: NORMAL
CHLORIDE BLD-SCNC: 106 MMOL/L (ref 94–109)
CO2 SERPL-SCNC: 30 MMOL/L (ref 20–32)
COCAINE UR QL: NORMAL
CREAT SERPL-MCNC: 0.66 MG/DL (ref 0.66–1.25)
EOSINOPHIL # BLD AUTO: 0.2 10E3/UL (ref 0–0.7)
EOSINOPHIL NFR BLD AUTO: 3 %
ERYTHROCYTE [DISTWIDTH] IN BLOOD BY AUTOMATED COUNT: 11.9 % (ref 10–15)
GFR SERPL CREATININE-BSD FRML MDRD: >90 ML/MIN/1.73M2
GLUCOSE BLD-MCNC: 113 MG/DL (ref 70–99)
HCT VFR BLD AUTO: 39.2 % (ref 40–53)
HGB BLD-MCNC: 14.3 G/DL (ref 13.3–17.7)
IMM GRANULOCYTES # BLD: 0.1 10E3/UL
IMM GRANULOCYTES NFR BLD: 1 %
LYMPHOCYTES # BLD AUTO: 2.3 10E3/UL (ref 0.8–5.3)
LYMPHOCYTES NFR BLD AUTO: 34 %
MAGNESIUM SERPL-MCNC: 1.8 MG/DL (ref 1.6–2.3)
MCH RBC QN AUTO: 30 PG (ref 26.5–33)
MCHC RBC AUTO-ENTMCNC: 36.5 G/DL (ref 31.5–36.5)
MCV RBC AUTO: 82 FL (ref 78–100)
MONOCYTES # BLD AUTO: 0.5 10E3/UL (ref 0–1.3)
MONOCYTES NFR BLD AUTO: 8 %
NEUTROPHILS # BLD AUTO: 3.7 10E3/UL (ref 1.6–8.3)
NEUTROPHILS NFR BLD AUTO: 53 %
NRBC # BLD AUTO: 0 10E3/UL
NRBC BLD AUTO-RTO: 0 /100
OPIATES UR QL SCN: NORMAL
PLATELET # BLD AUTO: 177 10E3/UL (ref 150–450)
POTASSIUM BLD-SCNC: 3.4 MMOL/L (ref 3.4–5.3)
PROT SERPL-MCNC: 7.1 G/DL (ref 6.8–8.8)
RBC # BLD AUTO: 4.77 10E6/UL (ref 4.4–5.9)
SARS-COV-2 RNA RESP QL NAA+PROBE: NEGATIVE
SODIUM SERPL-SCNC: 142 MMOL/L (ref 133–144)
WBC # BLD AUTO: 6.8 10E3/UL (ref 4–11)

## 2021-08-05 PROCEDURE — 85025 COMPLETE CBC W/AUTO DIFF WBC: CPT | Performed by: EMERGENCY MEDICINE

## 2021-08-05 PROCEDURE — 250N000011 HC RX IP 250 OP 636: Performed by: CLINICAL NURSE SPECIALIST

## 2021-08-05 PROCEDURE — C9803 HOPD COVID-19 SPEC COLLECT: HCPCS | Performed by: EMERGENCY MEDICINE

## 2021-08-05 PROCEDURE — 80307 DRUG TEST PRSMV CHEM ANLYZR: CPT | Performed by: EMERGENCY MEDICINE

## 2021-08-05 PROCEDURE — 258N000003 HC RX IP 258 OP 636: Performed by: EMERGENCY MEDICINE

## 2021-08-05 PROCEDURE — 36415 COLL VENOUS BLD VENIPUNCTURE: CPT | Performed by: EMERGENCY MEDICINE

## 2021-08-05 PROCEDURE — 250N000013 HC RX MED GY IP 250 OP 250 PS 637: Performed by: CLINICAL NURSE SPECIALIST

## 2021-08-05 PROCEDURE — 99285 EMERGENCY DEPT VISIT HI MDM: CPT | Mod: 25 | Performed by: EMERGENCY MEDICINE

## 2021-08-05 PROCEDURE — 80053 COMPREHEN METABOLIC PANEL: CPT | Performed by: EMERGENCY MEDICINE

## 2021-08-05 PROCEDURE — 96360 HYDRATION IV INFUSION INIT: CPT | Performed by: EMERGENCY MEDICINE

## 2021-08-05 PROCEDURE — 82075 ASSAY OF BREATH ETHANOL: CPT | Performed by: EMERGENCY MEDICINE

## 2021-08-05 PROCEDURE — 128N000004 HC R&B CD ADULT

## 2021-08-05 PROCEDURE — U0003 INFECTIOUS AGENT DETECTION BY NUCLEIC ACID (DNA OR RNA); SEVERE ACUTE RESPIRATORY SYNDROME CORONAVIRUS 2 (SARS-COV-2) (CORONAVIRUS DISEASE [COVID-19]), AMPLIFIED PROBE TECHNIQUE, MAKING USE OF HIGH THROUGHPUT TECHNOLOGIES AS DESCRIBED BY CMS-2020-01-R: HCPCS | Performed by: EMERGENCY MEDICINE

## 2021-08-05 PROCEDURE — 250N000013 HC RX MED GY IP 250 OP 250 PS 637: Performed by: EMERGENCY MEDICINE

## 2021-08-05 PROCEDURE — 99284 EMERGENCY DEPT VISIT MOD MDM: CPT | Performed by: EMERGENCY MEDICINE

## 2021-08-05 PROCEDURE — 83735 ASSAY OF MAGNESIUM: CPT | Performed by: EMERGENCY MEDICINE

## 2021-08-05 RX ORDER — FOLIC ACID 1 MG/1
1 TABLET ORAL DAILY
Status: DISCONTINUED | OUTPATIENT
Start: 2021-08-05 | End: 2021-08-05

## 2021-08-05 RX ORDER — FERROUS SULFATE 325(65) MG
325 TABLET ORAL DAILY
Status: DISCONTINUED | OUTPATIENT
Start: 2021-08-06 | End: 2021-08-08 | Stop reason: HOSPADM

## 2021-08-05 RX ORDER — OLANZAPINE 10 MG/1
10 TABLET ORAL AT BEDTIME
Status: DISCONTINUED | OUTPATIENT
Start: 2021-08-05 | End: 2021-08-08

## 2021-08-05 RX ORDER — FOLIC ACID 1 MG/1
1 TABLET ORAL DAILY
Status: DISCONTINUED | OUTPATIENT
Start: 2021-08-05 | End: 2021-08-08 | Stop reason: HOSPADM

## 2021-08-05 RX ORDER — HYDROXYZINE HYDROCHLORIDE 25 MG/1
25 TABLET, FILM COATED ORAL EVERY 4 HOURS PRN
Status: DISCONTINUED | OUTPATIENT
Start: 2021-08-05 | End: 2021-08-08 | Stop reason: HOSPADM

## 2021-08-05 RX ORDER — SERTRALINE HYDROCHLORIDE 100 MG/1
100 TABLET, FILM COATED ORAL DAILY
Status: DISCONTINUED | OUTPATIENT
Start: 2021-08-05 | End: 2021-08-08 | Stop reason: HOSPADM

## 2021-08-05 RX ORDER — DIAZEPAM 5 MG
5-20 TABLET ORAL EVERY 30 MIN PRN
Status: DISCONTINUED | OUTPATIENT
Start: 2021-08-05 | End: 2021-08-08 | Stop reason: HOSPADM

## 2021-08-05 RX ORDER — MULTIPLE VITAMINS W/ MINERALS TAB 9MG-400MCG
1 TAB ORAL DAILY
Status: DISCONTINUED | OUTPATIENT
Start: 2021-08-05 | End: 2021-08-05

## 2021-08-05 RX ORDER — DIAZEPAM 5 MG
5-20 TABLET ORAL EVERY 30 MIN PRN
Status: DISCONTINUED | OUTPATIENT
Start: 2021-08-05 | End: 2021-08-05

## 2021-08-05 RX ORDER — LANOLIN ALCOHOL/MO/W.PET/CERES
100 CREAM (GRAM) TOPICAL DAILY
Status: DISCONTINUED | OUTPATIENT
Start: 2021-08-05 | End: 2021-08-05

## 2021-08-05 RX ORDER — LANOLIN ALCOHOL/MO/W.PET/CERES
100 CREAM (GRAM) TOPICAL DAILY
Status: DISCONTINUED | OUTPATIENT
Start: 2021-08-05 | End: 2021-08-08 | Stop reason: HOSPADM

## 2021-08-05 RX ORDER — AMOXICILLIN 250 MG
1 CAPSULE ORAL 2 TIMES DAILY PRN
Status: DISCONTINUED | OUTPATIENT
Start: 2021-08-05 | End: 2021-08-08 | Stop reason: HOSPADM

## 2021-08-05 RX ORDER — MAGNESIUM HYDROXIDE/ALUMINUM HYDROXICE/SIMETHICONE 120; 1200; 1200 MG/30ML; MG/30ML; MG/30ML
30 SUSPENSION ORAL EVERY 4 HOURS PRN
Status: DISCONTINUED | OUTPATIENT
Start: 2021-08-05 | End: 2021-08-08 | Stop reason: HOSPADM

## 2021-08-05 RX ORDER — TRAZODONE HYDROCHLORIDE 50 MG/1
50-100 TABLET, FILM COATED ORAL
Status: DISCONTINUED | OUTPATIENT
Start: 2021-08-05 | End: 2021-08-08 | Stop reason: HOSPADM

## 2021-08-05 RX ORDER — ONDANSETRON 4 MG/1
4 TABLET, ORALLY DISINTEGRATING ORAL EVERY 6 HOURS PRN
Status: DISCONTINUED | OUTPATIENT
Start: 2021-08-05 | End: 2021-08-08 | Stop reason: HOSPADM

## 2021-08-05 RX ORDER — ACETAMINOPHEN 325 MG/1
650 TABLET ORAL EVERY 4 HOURS PRN
Status: DISCONTINUED | OUTPATIENT
Start: 2021-08-05 | End: 2021-08-08 | Stop reason: HOSPADM

## 2021-08-05 RX ORDER — PANTOPRAZOLE SODIUM 40 MG/1
40 TABLET, DELAYED RELEASE ORAL
Status: DISCONTINUED | OUTPATIENT
Start: 2021-08-06 | End: 2021-08-08 | Stop reason: HOSPADM

## 2021-08-05 RX ORDER — MULTIPLE VITAMINS W/ MINERALS TAB 9MG-400MCG
1 TAB ORAL DAILY
Status: DISCONTINUED | OUTPATIENT
Start: 2021-08-05 | End: 2021-08-08 | Stop reason: HOSPADM

## 2021-08-05 RX ORDER — LOPERAMIDE HCL 2 MG
2 CAPSULE ORAL 4 TIMES DAILY PRN
Status: DISCONTINUED | OUTPATIENT
Start: 2021-08-05 | End: 2021-08-08 | Stop reason: HOSPADM

## 2021-08-05 RX ORDER — ATENOLOL 50 MG/1
50 TABLET ORAL DAILY PRN
Status: DISCONTINUED | OUTPATIENT
Start: 2021-08-05 | End: 2021-08-08 | Stop reason: HOSPADM

## 2021-08-05 RX ADMIN — ONDANSETRON 4 MG: 4 TABLET, ORALLY DISINTEGRATING ORAL at 20:43

## 2021-08-05 RX ADMIN — DIAZEPAM 10 MG: 5 TABLET ORAL at 21:23

## 2021-08-05 RX ADMIN — SODIUM CHLORIDE 1000 ML: 9 INJECTION, SOLUTION INTRAVENOUS at 15:51

## 2021-08-05 RX ADMIN — DIAZEPAM 10 MG: 5 TABLET ORAL at 18:33

## 2021-08-05 ASSESSMENT — ENCOUNTER SYMPTOMS
EYE REDNESS: 0
NECK PAIN: 0
SORE THROAT: 0
DIFFICULTY URINATING: 0
VOMITING: 0
SHORTNESS OF BREATH: 0
HEADACHES: 0
ABDOMINAL PAIN: 0
DYSPHORIC MOOD: 1
COUGH: 0
BACK PAIN: 0
FEVER: 0
WEAKNESS: 0
SLEEP DISTURBANCE: 0
NAUSEA: 0

## 2021-08-05 ASSESSMENT — MIFFLIN-ST. JEOR: SCORE: 1879.32

## 2021-08-05 NOTE — ED PROVIDER NOTES
ED Provider Note  [facility]      History     Chief Complaint   Patient presents with     Addiction Problem     Requesting detoc for ETOH. Relapsed over the past week. Last drink: recently today. Last seizure: 2 months ago.      HPI  Martín Shoemaker is a 30 year old male who presents to the emergency department seeking detox from alcohol.  The patient has been drinking alcohol daily again for the past week.  He drinks 750 mL of vodka per day.  He has a history of alcohol withdrawal that includes tremors.  He also reports a history of alcohol withdrawal seizure approximately 2 months ago.  Patient endorses symptoms of depression but denies suicidal ideation.  He reports compliance with his antidepressant medications.  The patient denies any recent fall or injury.  He denies any recent illness or medical concerns.  He has received his first Covid vaccination.  He denies any recent known Covid exposures.    Past Medical History  Past Medical History:   Diagnosis Date     Depressive disorder      Hypertension     pt reports he takes meds for HTN     Substance abuse (H)      Ulcer, gastric, acute      Past Surgical History:   Procedure Laterality Date     ADENOIDECTOMY       ENDOSCOPY       TONSILLECTOMY Bilateral      hydrOXYzine (ATARAX) 25 MG tablet  multivitamin, therapeutic (THERA-VIT) TABS tablet  disulfiram (ANTABUSE) 250 MG tablet  ferrous sulfate (FE TABS) 325 (65 Fe) MG EC tablet  folic acid (FOLVITE) 1 MG tablet  gabapentin (NEURONTIN) 400 MG capsule  nicotine (NICORETTE) 2 MG gum  OLANZapine (ZYPREXA) 10 MG tablet  pantoprazole (PROTONIX) 40 MG EC tablet  sertraline (ZOLOFT) 100 MG tablet  thiamine (B-1) 100 MG tablet  traZODone (DESYREL) 50 MG tablet      No Known Allergies  Family History  Family History   Problem Relation Age of Onset     Hypertension Mother      Cerebrovascular Disease Maternal Grandfather      Cerebrovascular Disease Paternal Grandfather      Social History    Social History     Tobacco Use     Smoking status: Light Tobacco Smoker     Smokeless tobacco: Current User     Types: Chew   Substance Use Topics     Alcohol use: Not Currently     Comment: binge, relapsed a few months ago     Drug use: Yes     Types: Marijuana     Comment: medical cannibus      Past medical history, past surgical history, medications, allergies, family history, and social history were reviewed with the patient. No additional pertinent items.       Review of Systems   Constitutional: Negative for fever.   HENT: Negative for congestion and sore throat.    Eyes: Negative for redness.   Respiratory: Negative for cough and shortness of breath.    Cardiovascular: Negative for chest pain.   Gastrointestinal: Negative for abdominal pain, nausea and vomiting.   Genitourinary: Negative for difficulty urinating.   Musculoskeletal: Negative for back pain and neck pain.   Skin: Negative for rash.   Neurological: Negative for weakness and headaches.   Psychiatric/Behavioral: Positive for dysphoric mood. Negative for sleep disturbance and suicidal ideas.   All other systems reviewed and are negative.    A complete review of systems was performed with pertinent positives and negatives noted in the HPI, and all other systems negative.    Physical Exam   BP: 116/77  Pulse: 120  Temp: 98.6  F (37  C)  Resp: 16  Weight: 90.7 kg (200 lb) (stated)  SpO2: 96 %  Physical Exam  Vitals and nursing note reviewed.   Constitutional:       General: He is not in acute distress.     Appearance: Normal appearance. He is not diaphoretic.   HENT:      Head: Normocephalic and atraumatic.   Eyes:      General: No scleral icterus.     Pupils: Pupils are equal, round, and reactive to light.   Cardiovascular:      Rate and Rhythm: Regular rhythm. Tachycardia present.      Pulses: Normal pulses.      Heart sounds: Normal heart sounds.   Pulmonary:      Effort: No respiratory distress.      Breath sounds: Normal breath sounds.    Abdominal:      General: Bowel sounds are normal.      Palpations: Abdomen is soft.      Tenderness: There is no abdominal tenderness.   Musculoskeletal:         General: No tenderness. Normal range of motion.   Skin:     General: Skin is warm and dry.      Findings: No rash.   Neurological:      Mental Status: He is alert.      Gait: Gait normal.   Psychiatric:         Mood and Affect: Mood is depressed.         Thought Content: Thought content does not include suicidal ideation.         ED Course      Procedures       The medical record was reviewed and interpreted.  Current labs reviewed and interpreted.  Results for orders placed or performed during the hospital encounter of 08/05/21   Comprehensive metabolic panel     Status: Abnormal   Result Value Ref Range    Sodium 142 133 - 144 mmol/L    Potassium 3.4 3.4 - 5.3 mmol/L    Chloride 106 94 - 109 mmol/L    Carbon Dioxide (CO2) 30 20 - 32 mmol/L    Anion Gap 6 3 - 14 mmol/L    Urea Nitrogen 16 7 - 30 mg/dL    Creatinine 0.66 0.66 - 1.25 mg/dL    Calcium 8.1 (L) 8.5 - 10.1 mg/dL    Glucose 113 (H) 70 - 99 mg/dL    Alkaline Phosphatase 68 40 - 150 U/L    AST 34 0 - 45 U/L    ALT 46 0 - 70 U/L    Protein Total 7.1 6.8 - 8.8 g/dL    Albumin 3.9 3.4 - 5.0 g/dL    Bilirubin Total 0.3 0.2 - 1.3 mg/dL    GFR Estimate >90 >60 mL/min/1.73m2   Magnesium     Status: Normal   Result Value Ref Range    Magnesium 1.8 1.6 - 2.3 mg/dL   CBC with platelets and differential     Status: Abnormal   Result Value Ref Range    WBC Count 6.8 4.0 - 11.0 10e3/uL    RBC Count 4.77 4.40 - 5.90 10e6/uL    Hemoglobin 14.3 13.3 - 17.7 g/dL    Hematocrit 39.2 (L) 40.0 - 53.0 %    MCV 82 78 - 100 fL    MCH 30.0 26.5 - 33.0 pg    MCHC 36.5 31.5 - 36.5 g/dL    RDW 11.9 10.0 - 15.0 %    Platelet Count 177 150 - 450 10e3/uL    % Neutrophils 53 %    % Lymphocytes 34 %    % Monocytes 8 %    % Eosinophils 3 %    % Basophils 1 %    % Immature Granulocytes 1 %    NRBCs per 100 WBC 0 <1 /100     Absolute Neutrophils 3.7 1.6 - 8.3 10e3/uL    Absolute Lymphocytes 2.3 0.8 - 5.3 10e3/uL    Absolute Monocytes 0.5 0.0 - 1.3 10e3/uL    Absolute Eosinophils 0.2 0.0 - 0.7 10e3/uL    Absolute Basophils 0.0 0.0 - 0.2 10e3/uL    Absolute Immature Granulocytes 0.1 (H) <=0.0 10e3/uL    Absolute NRBCs 0.0 10e3/uL   Drug abuse screen 1 urine (ED)     Status: Normal   Result Value Ref Range    Amphetamines Urine Screen Negative Screen Negative    Barbiturates Urine Screen Negative Screen Negative    Benzodiazepines Urine Screen Negative Screen Negative    Cannabinoids Urine Screen Negative Screen Negative    Cocaine Urine Screen Negative Screen Negative    Opiates Urine Screen Negative Screen Negative   SARS-COV2 (COVID-19) Virus RT-PCR     Status: Normal    Specimen: Nasopharyngeal; Swab   Result Value Ref Range    SARS CoV2 PCR Negative Negative    Narrative    Testing was performed using the marta  SARS-CoV-2 & Influenza A/B Assay on the marta  [employer]  System.  This test should be ordered for the detection of SARS-COV-2 in individuals who meet SARS-CoV-2 clinical and/or epidemiological criteria. Test performance is unknown in asymptomatic patients.  This test is for in vitro diagnostic use under the FDA EUA for laboratories certified under CLIA to perform moderate and/or high complexity testing. This test has not been FDA cleared or approved.  A negative test does not rule out the presence of PCR inhibitors in the specimen or target RNA in concentration below the limit of detection for the assay. The possibility of a false negative should be considered if the patient's recent exposure or clinical presentation suggests COVID-19.  [facility] are certified under the Clinical Laboratory Improvement Amendments of 1988 (CLIA-88) as qualified to perform moderate and/or high complexity laboratory testing.   Alcohol breath test POCT     Status: Abnormal   Result Value Ref Range    Alcohol Breath Test 0.208 (A)  0.00 - 0.01   Asymptomatic COVID-19 Virus (Coronavirus) by PCR Nasopharyngeal     Status: Normal    Specimen: Nasopharyngeal; Swab    Narrative    The following orders were created for panel order Asymptomatic COVID-19 Virus (Coronavirus) by PCR Nasopharyngeal.  Procedure                               Abnormality         Status                     ---------                               -----------         ------                     SARS-COV2 (COVID-19) Vir...[[id number]]  Normal              Final result                 Please view results for these tests on the individual orders.   Urine Drugs of Abuse Screen     Status: Normal    Narrative    The following orders were created for panel order Urine Drugs of Abuse Screen.  Procedure                               Abnormality         Status                     ---------                               -----------         ------                     Drug abuse screen 1 urin...[[id number]]  Normal              Final result                 Please view results for these tests on the individual orders.   CBC with platelets differential     Status: Abnormal    Narrative    The following orders were created for panel order CBC with platelets differential.  Procedure                               Abnormality         Status                     ---------                               -----------         ------                     CBC with platelets and d...[[id number]]  Abnormal            Final result                 Please view results for these tests on the individual orders.         Results for orders placed or performed during the hospital encounter of 08/05/21   Comprehensive metabolic panel     Status: Abnormal   Result Value Ref Range    Sodium 142 133 - 144 mmol/L    Potassium 3.4 3.4 - 5.3 mmol/L    Chloride 106 94 - 109 mmol/L    Carbon Dioxide (CO2) 30 20 - 32 mmol/L    Anion Gap 6 3 - 14 mmol/L    Urea Nitrogen 16 7 - 30 mg/dL    Creatinine 0.66 0.66 - 1.25 mg/dL     Calcium 8.1 (L) 8.5 - 10.1 mg/dL    Glucose 113 (H) 70 - 99 mg/dL    Alkaline Phosphatase 68 40 - 150 U/L    AST 34 0 - 45 U/L    ALT 46 0 - 70 U/L    Protein Total 7.1 6.8 - 8.8 g/dL    Albumin 3.9 3.4 - 5.0 g/dL    Bilirubin Total 0.3 0.2 - 1.3 mg/dL    GFR Estimate >90 >60 mL/min/1.73m2   Magnesium     Status: Normal   Result Value Ref Range    Magnesium 1.8 1.6 - 2.3 mg/dL   CBC with platelets and differential     Status: Abnormal   Result Value Ref Range    WBC Count 6.8 4.0 - 11.0 10e3/uL    RBC Count 4.77 4.40 - 5.90 10e6/uL    Hemoglobin 14.3 13.3 - 17.7 g/dL    Hematocrit 39.2 (L) 40.0 - 53.0 %    MCV 82 78 - 100 fL    MCH 30.0 26.5 - 33.0 pg    MCHC 36.5 31.5 - 36.5 g/dL    RDW 11.9 10.0 - 15.0 %    Platelet Count 177 150 - 450 10e3/uL    % Neutrophils 53 %    % Lymphocytes 34 %    % Monocytes 8 %    % Eosinophils 3 %    % Basophils 1 %    % Immature Granulocytes 1 %    NRBCs per 100 WBC 0 <1 /100    Absolute Neutrophils 3.7 1.6 - 8.3 10e3/uL    Absolute Lymphocytes 2.3 0.8 - 5.3 10e3/uL    Absolute Monocytes 0.5 0.0 - 1.3 10e3/uL    Absolute Eosinophils 0.2 0.0 - 0.7 10e3/uL    Absolute Basophils 0.0 0.0 - 0.2 10e3/uL    Absolute Immature Granulocytes 0.1 (H) <=0.0 10e3/uL    Absolute NRBCs 0.0 10e3/uL   Drug abuse screen 1 urine (ED)     Status: Normal   Result Value Ref Range    Amphetamines Urine Screen Negative Screen Negative    Barbiturates Urine Screen Negative Screen Negative    Benzodiazepines Urine Screen Negative Screen Negative    Cannabinoids Urine Screen Negative Screen Negative    Cocaine Urine Screen Negative Screen Negative    Opiates Urine Screen Negative Screen Negative   SARS-COV2 (COVID-19) Virus RT-PCR     Status: Normal    Specimen: Nasopharyngeal; Swab   Result Value Ref Range    SARS CoV2 PCR Negative Negative    Narrative    Testing was performed using the marta  SARS-CoV-2 & Influenza A/B Assay on the marta  [employer]  System.  This test should be ordered for the detection of  SARS-COV-2 in individuals who meet SARS-CoV-2 clinical and/or epidemiological criteria. Test performance is unknown in asymptomatic patients.  This test is for in vitro diagnostic use under the FDA EUA for laboratories certified under CLIA to perform moderate and/or high complexity testing. This test has not been FDA cleared or approved.  A negative test does not rule out the presence of PCR inhibitors in the specimen or target RNA in concentration below the limit of detection for the assay. The possibility of a false negative should be considered if the patient's recent exposure or clinical presentation suggests COVID-19.  [facility] are certified under the Clinical Laboratory Improvement Amendments of 1988 (CLIA-88) as qualified to perform moderate and/or high complexity laboratory testing.   Alcohol breath test POCT     Status: Abnormal   Result Value Ref Range    Alcohol Breath Test 0.208 (A) 0.00 - 0.01   Asymptomatic COVID-19 Virus (Coronavirus) by PCR Nasopharyngeal     Status: Normal    Specimen: Nasopharyngeal; Swab    Narrative    The following orders were created for panel order Asymptomatic COVID-19 Virus (Coronavirus) by PCR Nasopharyngeal.  Procedure                               Abnormality         Status                     ---------                               -----------         ------                     SARS-COV2 (COVID-19) Vir...[[id number]]  Normal              Final result                 Please view results for these tests on the individual orders.   Urine Drugs of Abuse Screen     Status: Normal    Narrative    The following orders were created for panel order Urine Drugs of Abuse Screen.  Procedure                               Abnormality         Status                     ---------                               -----------         ------                     Drug abuse screen 1 urin...[[id number]]  Normal              Final result                 Please view results for  these tests on the individual orders.   CBC with platelets differential     Status: Abnormal    Narrative    The following orders were created for panel order CBC with platelets differential.  Procedure                               Abnormality         Status                     ---------                               -----------         ------                     CBC with platelets and d...[[id number]]  Abnormal            Final result                 Please view results for these tests on the individual orders.     Medications   0.9% sodium chloride BOLUS (1,000 mLs Intravenous New Bag 8/5/21 1417)        Assessments & Plan (with Medical Decision Making)   30 year old male with history of alcohol abuse and dependence to the emergency department seeking detox.  Has been drinking alcohol daily again for the past week after he got out of lodging plus.  He experiences withdrawal that consist of tremors.  Additionally, he had an alcohol withdrawal seizure approximately 2 months ago.  Patient's last drink was approximately 2 hours prior to coming into the emergency department.  He arrived with an alcohol level of 0.208.  He was initially tachycardic but that resolved with IV fluids.  His labs are normal/baseline.  He has received 1 of 2 Covid vaccinations, has no known Covid exposures, no Covid symptoms, and is Covid negative.  Patient's urine tox screen is otherwise negative.  He is voluntary for detox admission.  The patient appears medically stable for detox admission.    I have reviewed the nursing notes. I have reviewed the findings, diagnosis, plan and need for follow up with the patient.    New Prescriptions    No medications on file       Final diagnoses:   Alcohol dependence with uncomplicated intoxication (H)     Chart documentation was completed with Dragon voice-recognition software. Even though reviewed, this chart may still contain some grammatical, spelling, and word errors.     --  Phill RAMÍREZ  [facility] EMERGENCY DEPARTMENT  8/5/2021     Phill Christian MD  08/05/21 1640

## 2021-08-05 NOTE — ED NOTES
ED to Behavioral Floor Handoff    SITUATION  Martín Shoemaker is a 30 year old male who speaks English and lives in a home alone The patient arrived in the ED by private car from emergency room with a complaint of Addiction Problem (Requesting detoc for ETOH. Relapsed over the past week. Last drink: recently today. Last seizure: 2 months ago. )  .The patient's current symptoms started/worsened 5 day(s) ago and during this time the symptoms have increased.   In the ED, pt was diagnosed with   Final diagnoses:   None        Initial vitals were: BP: 116/77  Pulse: 120  Temp: 98.6  F (37  C)  Resp: 16  SpO2: 96 %   --------  Is the patient diabetic? No   If yes, last blood glucose? --     If yes, was this treated in the ED? --  --------  Is the patient inebriated (ETOH) No or Impaired on other substances? No  MSSA done? Yes  Last MSSA score: --    Were withdrawal symptoms treated? Yes  Does the patient have a seizure history? Yes. If yes, date of most recent seizure--  --------  Is the patient patient experiencing suicidal ideation? denies current or recent suicidal ideation     Homicidal ideation? denies current or recent homicidal ideation or behaviors.    Self-injurious behavior/urges? denies current or recent self injurious behavior or ideation.  ------  Was pt aggressive in the ED No  Was a code called No  Is the pt now cooperative? Yes  -------  Meds given in ED:   Medications   0.9% sodium chloride BOLUS (1,000 mLs Intravenous New Bag 8/5/21 8963)      Family present during ED course? No  Family currently present? No    BACKGROUND  Does the patient have a cognitive impairment or developmental disability? No  Allergies: No Known Allergies.   Social demographics are   Social History     Socioeconomic History     Marital status: Single     Spouse name: Not on file     Number of children: Not on file     Years of education: Not on file     Highest education level: Not on file   Occupational History     Not on file    Tobacco Use     Smoking status: Light Tobacco Smoker     Smokeless tobacco: Current User     Types: Chew   Substance and Sexual Activity     Alcohol use: Not Currently     Comment: binge, relapsed a few months ago     Drug use: Yes     Types: Marijuana     Comment: medical cannibus     Sexual activity: Yes   Other Topics Concern     Not on file   Social History Narrative     Not on file     Social Determinants of Health     Financial Resource Strain:      Difficulty of Paying Living Expenses:    Food Insecurity:      Worried About Running Out of Food in the Last Year:      Ran Out of Food in the Last Year:    Transportation Needs:      Lack of Transportation (Medical):      Lack of Transportation (Non-Medical):    Physical Activity:      Days of Exercise per Week:      Minutes of Exercise per Session:    Stress:      Feeling of Stress :    Social Connections:      Frequency of Communication with Friends and Family:      Frequency of Social Gatherings with Friends and Family:      Attends Sabianism Services:      Active Member of Clubs or Organizations:      Attends Club or Organization Meetings:      Marital Status:    Intimate Partner Violence:      Fear of Current or Ex-Partner:      Emotionally Abused:      Physically Abused:      Sexually Abused:         ASSESSMENT  Labs results   Labs Ordered and Resulted from Time of ED Arrival Up to the Time of Departure from the ED   CBC WITH PLATELETS AND DIFFERENTIAL - Abnormal; Notable for the following components:       Result Value    Hematocrit 39.2 (*)     Absolute Immature Granulocytes 0.1 (*)     All other components within normal limits   ALCOHOL BREATH TEST POCT - Abnormal; Notable for the following components:    Alcohol Breath Test 0.208 (*)     All other components within normal limits   COMPREHENSIVE METABOLIC PANEL   MAGNESIUM   DRUG ABUSE SCREEN 1 URINE (ED)   SARS-COV2 (COVID-19) VIRUS RT-PCR   PERIPHERAL IV CATHETER   COVID-19 VIRUS (CORONAVIRUS) BY PCR     Narrative:     The following orders were created for panel order Asymptomatic COVID-19 Virus (Coronavirus) by PCR Nasopharyngeal.  Procedure                               Abnormality         Status                     ---------                               -----------         ------                     SARS-COV2 (COVID-19) Vir...[[id number]]                      In process                   Please view results for these tests on the individual orders.   CBC WITH PLATELETS & DIFFERENTIAL    Narrative:     The following orders were created for panel order CBC with platelets differential.  Procedure                               Abnormality         Status                     ---------                               -----------         ------                     CBC with platelets and d...[[id number]]  Abnormal            Final result                 Please view results for these tests on the individual orders.   URINE DRUGS OF ABUSE SCREEN    Narrative:     The following orders were created for panel order Urine Drugs of Abuse Screen.  Procedure                               Abnormality         Status                     ---------                               -----------         ------                     Drug abuse screen 1 urin...[[id number]]                      In process                   Please view results for these tests on the individual orders.      Imaging Studies: No results found for this or any previous visit (from the past 24 hour(s)).   Most recent vital signs /67   Pulse 98   Temp (!) 96.7  F (35.9  C) (Oral)   Resp 16   SpO2 95%    Abnormal labs/tests/findings requiring intervention:---   Pain control: fair  Nausea control: fair    RECOMMENDATION  Are any infection precautions needed (MRSA, VRE, etc.)? No If yes, what infection? --  ---  Does the patient have mobility issues? independently. If yes, what device does the pt use? ---  ---  Is patient on 72 hour hold or commitment? No If on 72  hour hold, have hold and rights been given to patient? N/A  Are admitting orders written if after 10 p.m. ?No  Tasks needing to be completed:---     Adri Briscoe RN   [facility]--    [phone] [place] [facility]   [phone] [facility]

## 2021-08-05 NOTE — PROGRESS NOTES
08/05/21 1834   Patient Belongings   Did you bring any home meds/supplements to the hospital?  No   Patient Belongings other (see comments)   Patient Belongings Remaining with Patient other (see comments)   Patient Belongings Put in Hospital Secure Location (Security or Locker, etc.) other (see comments)   Belongings Search Yes   Clothing Search Yes   Second Staff Paolo   Comment SeeNotes     Large bin:  -Tennis shoes  - hat  Small bin  -Phone  - Wallet  Security # [id number]  $[phone] Check # [phone]  Master cards ([phone])    A               Admission:  I am responsible for any personal items that are not sent to the safe or pharmacy.  [facility] is not responsible for loss, theft or damage of any property in my possession.    Signature:  _________________________________ Date: _______  Time: _____                                              Staff Signature:  ____________________________ Date: ________  Time: _____      2nd Staff person, if patient is unable/unwilling to sign:    Signature: ________________________________ Date: ________  Time: _____     Discharge:  [facility] has returned all of my personal belongings:    Signature: _________________________________ Date: ________  Time: _____                                          Staff Signature:  ____________________________ Date: ________  Time: _____

## 2021-08-05 NOTE — TELEPHONE ENCOUNTER
S: [facility], 30/M, alcohol detox     B: Pt discharged on Wed from [facility] and resumed drinking on Thursday   750ML of vodka daily since discharge   Hx of sz, last 2 months ago   NILDA before arrival   Breath .208  Pt reports dep sx, no acute MH concerns, med compliant   UDS neg     Medically cleared, eating, drinking, ambulating indep  Patient cleared and ready for behavioral bed placement: Yes   No covid concerns, test neg     A: Voluntary     R: 3A/[facility]   CD admit     437pm - [facility] paged   441pm - [facility] accepts   Pt placed in queue   444pm - unit charge notified, 6pm for report   447pm - ED charge notified via text page

## 2021-08-05 NOTE — PHARMACY-ADMISSION MEDICATION HISTORY
Admission Medication History Completed by Pharmacy    See [facility] Admission Navigator for allergy information, preferred outpatient pharmacy, prior to admission medications and immunization status.     Medication History Sources:     Patient interview and dispense report    Changes made to PTA medication list (reason):    Added: None    Deleted: Gabapentin and nicotine gum.     Changed: None    Additional Information:    None    Prior to Admission medications    Medication Sig Last Dose Taking? Auth Provider   ferrous sulfate (FE TABS) 325 (65 Fe) MG EC tablet Take 1 tablet (325 mg) by mouth daily 8/4/2021 at Unknown time Yes Reyna Mcdaniels MD   folic acid (FOLVITE) 1 MG tablet Take 1 tablet (1 mg) by mouth daily 8/4/2021 at Unknown time Yes Reyna Mcdaniels MD   hydrOXYzine (ATARAX) 25 MG tablet Take 1-2 tablets (25-50 mg) by mouth 3 times daily as needed for anxiety 8/4/2021 at Unknown time Yes Reyna Mcdaniels MD   multivitamin, therapeutic (THERA-VIT) TABS tablet Take 1 tablet by mouth daily 8/4/2021 at Unknown time Yes Reyna Mcdaniels MD   OLANZapine (ZYPREXA) 10 MG tablet Take 1 tablet (10 mg) by mouth At Bedtime 8/4/2021 at Unknown time Yes Reyan Mcdaniels MD   pantoprazole (PROTONIX) 40 MG EC tablet Take 1 tablet (40 mg) by mouth every morning (before breakfast) 8/4/2021 at Unknown time Yes Reyna Mcdaniels MD   sertraline (ZOLOFT) 100 MG tablet Take 1 tablet (100 mg) by mouth daily 8/4/2021 at Unknown time Yes Reyna Mcdaniels MD   thiamine (B-1) 100 MG tablet Take 1 tablet (100 mg) by mouth daily 8/4/2021 at Unknown time Yes Reyna Mcdaniels MD   traZODone (DESYREL) 50 MG tablet Take 1-2 tablets ( mg) by mouth nightly as needed for sleep 8/4/2021 at Unknown time Yes Reyna Mcdaniels MD       Date completed: 08/05/21    Medication history completed by: JILLIAN OSCAR [facility]

## 2021-08-06 LAB
FOLATE SERPL-MCNC: 43.1 NG/ML
GGT SERPL-CCNC: 80 U/L (ref 0–75)
TSH SERPL DL<=0.005 MIU/L-ACNC: 1.99 MU/L (ref 0.4–4)

## 2021-08-06 PROCEDURE — 99232 SBSQ HOSP IP/OBS MODERATE 35: CPT | Performed by: PHYSICIAN ASSISTANT

## 2021-08-06 PROCEDURE — H2032 ACTIVITY THERAPY, PER 15 MIN: HCPCS

## 2021-08-06 PROCEDURE — 99222 1ST HOSP IP/OBS MODERATE 55: CPT | Mod: AI | Performed by: PSYCHIATRY & NEUROLOGY

## 2021-08-06 PROCEDURE — 250N000013 HC RX MED GY IP 250 OP 250 PS 637: Performed by: CLINICAL NURSE SPECIALIST

## 2021-08-06 PROCEDURE — 84443 ASSAY THYROID STIM HORMONE: CPT | Performed by: CLINICAL NURSE SPECIALIST

## 2021-08-06 PROCEDURE — 82746 ASSAY OF FOLIC ACID SERUM: CPT | Performed by: PSYCHIATRY & NEUROLOGY

## 2021-08-06 PROCEDURE — HZ2ZZZZ DETOXIFICATION SERVICES FOR SUBSTANCE ABUSE TREATMENT: ICD-10-PCS | Performed by: PHYSICIAN ASSISTANT

## 2021-08-06 PROCEDURE — 128N000004 HC R&B CD ADULT

## 2021-08-06 PROCEDURE — 82977 ASSAY OF GGT: CPT | Performed by: CLINICAL NURSE SPECIALIST

## 2021-08-06 PROCEDURE — 99207 PR CONSULT E&M CHANGED TO SUBSEQUENT LEVEL: CPT | Performed by: PHYSICIAN ASSISTANT

## 2021-08-06 PROCEDURE — 36415 COLL VENOUS BLD VENIPUNCTURE: CPT | Performed by: CLINICAL NURSE SPECIALIST

## 2021-08-06 PROCEDURE — 250N000013 HC RX MED GY IP 250 OP 250 PS 637: Performed by: PHYSICIAN ASSISTANT

## 2021-08-06 RX ORDER — CLONIDINE HYDROCHLORIDE 0.1 MG/1
0.1 TABLET ORAL EVERY 8 HOURS PRN
Status: DISCONTINUED | OUTPATIENT
Start: 2021-08-06 | End: 2021-08-08 | Stop reason: HOSPADM

## 2021-08-06 RX ADMIN — FERROUS SULFATE TAB 325 MG (65 MG ELEMENTAL FE) 325 MG: 325 (65 FE) TAB at 08:44

## 2021-08-06 RX ADMIN — OLANZAPINE 10 MG: 10 TABLET ORAL at 21:21

## 2021-08-06 RX ADMIN — MULTIPLE VITAMINS W/ MINERALS TAB 1 TABLET: TAB at 08:44

## 2021-08-06 RX ADMIN — CLONIDINE HYDROCHLORIDE 0.1 MG: 0.1 TABLET ORAL at 16:15

## 2021-08-06 RX ADMIN — FOLIC ACID 1 MG: 1 TABLET ORAL at 08:44

## 2021-08-06 RX ADMIN — DIAZEPAM 10 MG: 5 TABLET ORAL at 20:13

## 2021-08-06 RX ADMIN — DIAZEPAM 10 MG: 5 TABLET ORAL at 16:15

## 2021-08-06 RX ADMIN — TRAZODONE HYDROCHLORIDE 100 MG: 50 TABLET ORAL at 21:21

## 2021-08-06 RX ADMIN — HYDROXYZINE HYDROCHLORIDE 25 MG: 25 TABLET, FILM COATED ORAL at 21:21

## 2021-08-06 RX ADMIN — SERTRALINE HYDROCHLORIDE 100 MG: 100 TABLET, FILM COATED ORAL at 08:44

## 2021-08-06 RX ADMIN — PANTOPRAZOLE SODIUM 40 MG: 40 TABLET, DELAYED RELEASE ORAL at 08:44

## 2021-08-06 RX ADMIN — THIAMINE HCL TAB 100 MG 100 MG: 100 TAB at 08:44

## 2021-08-06 RX ADMIN — DIAZEPAM 10 MG: 5 TABLET ORAL at 08:44

## 2021-08-06 ASSESSMENT — ACTIVITIES OF DAILY LIVING (ADL)
ORAL_HYGIENE: INDEPENDENT
ORAL_HYGIENE: INDEPENDENT
HYGIENE/GROOMING: HANDWASHING;INDEPENDENT
HYGIENE/GROOMING: INDEPENDENT
LAUNDRY: WITH SUPERVISION
DRESS: STREET CLOTHES
DRESS: STREET CLOTHES;INDEPENDENT

## 2021-08-06 NOTE — PLAN OF CARE
Problem: Alcohol Withdrawal  Goal: Alcohol Withdrawal Symptom Control  Outcome: No Change    Pt admitted for alcohol withdrawal, MSSA 8 received 10 mg of valium so far this shift for a total of 40 mg since admission.  He reports anxiety 5/10 and depression 6/10 but denies any thoughts plan or intent for self harm.  He reports feeling remorseful about drinking and is hoping to return to Phase II through the [facility] program.  He was visible on the unit, social with peers and attended group.  He denies any needs or concerns.

## 2021-08-06 NOTE — PROGRESS NOTES
Martín scored 5 & 4 on MSSA. He slept through the night without any incident. Will continue to monitor.

## 2021-08-06 NOTE — PROGRESS NOTES
Met with Pt to begin discharge planning.  Pt completed [facility] recently and is scheduled to begin Phase 2 on 8/12.  Pt also plans to attend 3 meetings per week and has a sponsor who will hold him accountable.  Advised Pt to seek assistance if needs arise.  Pt acknowledged.

## 2021-08-06 NOTE — PLAN OF CARE
30yr M admitted to station 3A under Dr. Mcdaniels for alcohol withdrawal.     Pt has been drinking about 2 pints of vodka daily for the past week. Pt was last on 3A in July and then completed treatment at [facility]. Pt states that he began drinking immediately after completing treatment. Pt was not responding to his parents calls and they checked up on him at his apartment this morning and found pt intoxicated and brought pt to the ED.      Pt is cooperative during the admission process. Pt is tearful during admission intake. Pt is distraught and feeling depressed about relapse. Pts MSSA score upon arrival was 13 and was given 10mg of valium. Pt attempted to eat dinner, but did not have appetite.    Around 2100 pt scored a 10 on MSSA and was given 10mg of valium.    Pts scheduled olanzapine was held due to pt in withdrawal and receiving valium.       Pt denies SI/SIB. Pt is visibly despondent.      Pt has hx of withdrawal seizures.     Continue to monitor for alcohol withdrawal per MSSA protocol.

## 2021-08-06 NOTE — PLAN OF CARE
"Music Therapy Group note    Clinical Hours in session: 1.0    Number of patients in group: 3    Scope of service: psychodynamic     Patient progress: initial encounter    Intervention: Music Psychotherapy     Goal of group: to express one's feelings and gain personal insights     Patient response/reaction to treatment intervention(s): Martín initially showed very little interest in participating in group today.  MT made conversation, and shared a sheet of written lyrics with him to an original song.  He read it and earnestly said \"wow-that's good\".  Peers slowly joined the group and MT was able to share 3 live songs on recovery topics and lead the group in discussion of how it applies to their lives.    Martín talked about \"blacking out before I drank\" in his relapse.  Talked about the strange mental blank spot, and also how he is \"all burnt out on the 12 steps\", as he went to a 12-step based treatment center multiple times.  He presented as shocked and in disbelief.  He \"did not see this one coming\".  He processed well to a point in group, but was unable to identify what precipitated the relapse.      He thanked MT for group, and was very open, cooperative throughout.     Unique Lua, MT-BC  Board-Certified Music Therapist           "

## 2021-08-06 NOTE — PLAN OF CARE
"Pt denies suicidal ideation plans or intent. Endorses feeling disappointment over relapse \"I just can't believe I drank\" \"can't believe I let my folks down and me down\" with the relapse. Reports Parents showed up to his apartment and \"I was just hammered\". States this is a \"3-4 day relapse\". Endorses anxiety and depression both rated 9 of 10 in severity. MSSA score today is an 8, 10 mg po valium administered.     Outcome: mild withdrawal requiring medication to treat. Highly depressed/anxious mood.    Will continue to monitor and intervene as warranted.     "

## 2021-08-06 NOTE — H&P
Martín Shoemaker is a 30 year old male who was referred by SELF      History was provided by PATEINT who was a fair historian.   CHIEF COMPLAINT: Alcohol     HISTORY OF PRESENT ILLNESS:    Martín Shoemaker is a 30 year old male.Patient has a past medical history significant for substance abuse, alcohol withdrawals including seizures and DTs, gastric ulcer, HTN, and depression.      Patient is known to me from previous admissions.  He was in [facility]  He left [facility] and was sober for 2 or 3 days and then relapsed he came to the emergency room wanting help.  He reports his been drinking daily for the past week about 750 mm of vodka     Patient has been using the following substances: Alcohol  Started at age 13 or 58, became a problem at 1 years ago after a bad break-up.    He does have a history of withdrawal seizures this is what prompted him to come get help     Patient has tolerance, withdrawal, progressive use, loss of control, spending more time and more amount than intended. Patient has made attempts to quit, is experiencing cravings, and reports negative consequences.                    Patient does have a history of seizures.  Patient does not have a history of delirium tremens.                    Denies thoughts of suicide or harming others.       Denies auditory or visual hallucinations.      Patient smokes 0 cigarettes he denies any illicit drug use        Patient denied any gambling        PSYCHIATRIC REVIEW OF SYSTEMS:          Psychiatric Review of Systems:   Depression:    Denied depressed mood, suicidal ideation, decreased interest, changes in sleep, changes in appetite, guilt, hopelessness, helplessness, impaired concentration, decreased energy, irritability.   Denies: depressed mood, suicidal ideation, decreased interest, changes in sleep, changes in appetite, guilt, hopelessness, helplessness, impaired concentration, decreased energy, irritability.  Janene:    Denied sleeplessness,  impulsiveness, racing thoughts, increased goal-directed activities, pressured speech, increase in energy  Janene Feeling euphoric,Distractible,Impulsive,Grandiose,Talking excessively,Have energy without sleeping,Mood swings,Irritability  Denies: sleeplessness, increased goal-directed activities, abrupt increase in energy, pressured speech  Psychosis:     Denies: visual hallucinations, auditory hallucinations, paranoia  Anxiety:    Denied excessive worries that are difficult to control for the past 6 months,   Chronic anxiety , not able to stop worrying impacting sleep, poor conc, irritable , muscle tension     Anxiety  Pt has following s/o of anxiety  Feeling anxious all the time,Excessive worry,Not able to stop worrying,Impacting sleep,Concentration,Muscle tension,Irritability,Fatigue  Denied panic attacks       Denies: worries that are difficult to control for the past 6 months, panic attacks  PTSD:     Denies: re-experiencing past trauma, nightmares, increased arousal, avoidance of traumatic stimuli, impaired function.  OCD:     Denies: obsessions, checking, symmetry, cleaning, skin picking.  ED:     Denies: restriction, binging, purging.     Denied symptoms of attention deficit disorder include a failure to pay attention to detail, a pattern of careless mistakes, a pattern of inattentive listening, a failure to follow through with projects, poor personal organization, losing necessary objects, distractibility, forgetfulness.     Denied symptoms of borderline personality disorder include a fear of abandonment, unstable self-image, impulsive behavior, dissociative feeling, intense anger, unstable personal relationships, chronic feelings of boredom, periods of intense depressed mood.                  PSYCHIATRIC HISTORY            Past court commitments: none  SIB /SUICIDE ATTEMPTS NONE  Psych Hosp : 5 or 6 times last hospitalization was for substance-induced paranoia he was using cocaine at that time     Inpatient  cd trt 6 CD treatments vague about details  Out pt cd trt           SOCIAL HISTORY                                                                         Is a  he is single lives in [place]         Family History:   FAMILY HISTORY:   Family History         Family History   Problem Relation Age of Onset     Hypertension Mother       Cerebrovascular Disease Maternal Grandfather       Cerebrovascular Disease Paternal Grandfather           Family Mental Health History-  none     Substance Use Problems -none                PTA Medications:      Prescriptions Prior to Admission           Medications Prior to Admission   Medication Sig Dispense Refill Last Dose     ferrous sulfate (FE TABS) 325 (65 Fe) MG EC tablet Take 1 tablet (325 mg) by mouth daily     6/28/2021     folic acid (FOLVITE) 1 MG tablet Take 1 tablet (1 mg) by mouth daily 30 tablet 0 6/28/2021     hydrOXYzine (ATARAX) 25 MG tablet Take 1-2 tablets (25-50 mg) by mouth 3 times daily as needed for anxiety 30 tablet 0 6/28/2021     multivitamin, therapeutic (THERA-VIT) TABS tablet Take 1 tablet by mouth daily 30 tablet 0 6/28/2021     OLANZapine (ZYPREXA) 10 MG tablet Take 1 tablet (10 mg) by mouth At Bedtime 30 tablet 0 6/28/2021     omeprazole (PRILOSEC) 40 MG DR capsule Take 40 mg by mouth daily     Past Week     sertraline (ZOLOFT) 100 MG tablet Take 100 mg by mouth daily     Past Week     thiamine (B-1) 100 MG tablet Take 1 tablet (100 mg) by mouth daily 30 tablet 0 6/28/2021     traZODone (DESYREL) 50 MG tablet Take  mg by mouth nightly as needed for sleep     6/28/2021     nicotine (NICODERM CQ) 14 MG/24HR 24 hr patch Place 1 patch onto the skin daily 30 patch 0 More than a month              Allergies:   No Known Allergies       Labs: Reviewed      Physical Exam:      ROS: 10 point ROS neg other than the symptoms noted above in the HPI.   Blood pressure (!) 157/108, pulse 94, temperature 98.2  F (36.8  C), temperature source Temporal,  "resp. rate 16, height 1.803 m (5' 11\"), weight 90.7 kg (200 lb), SpO2 95 %.           Past Medical History:   PAST MEDICAL HISTORY:   Past Medical History        Past Medical History:   Diagnosis Date     Depressive disorder       Hypertension       pt reports he takes meds for HTN     Substance abuse (H)       Ulcer, gastric, acute              PAST SURGICAL HISTORY:   Past Surgical History         Past Surgical History:   Procedure Laterality Date     ADENOIDECTOMY         TONSILLECTOMY Bilateral              -     -                         MENTAL STATUS EXAM:        Constitutional: General appearance of patient:  Appearance:  awake, alert, appeared as age stated, adequate groomed and slightly unkempt  Attitude:  cooperative  Eye Contact:  good  Mood:   Tired  Affect:  congruent   Speech:  clear, coherent normal rate   Psychomotor Behavior:  no evidence of tardive dyskinesia, dystonia, or tics  Thought Process:  logical, linear and goal oriented  Associations:  no loose associations  Thought Content:  no evidence of psychotic thought and active suicidal ideation present  Denied any active suicidal /homicidation ideation plan intent   Insight:  fair  Judgment:  fair  Oriented to:  time, person, and place  Attention Span and Concentration:  intact  Recent and Remote Memory:  intact  Language:  english with appropriate syntax and vocabulary  Fund of Knowledge: appropriate  Muscle Strength and Tone: normal  Gait and Station: Normal      There are no abnormal or psychotic thoughts, no preoccupations, no overvalued ideas, no rumination, no obsessions, no compulsions, no somatic concerns, no hypochrondriasis, no ideas of reference, and no delusions.  Patient denies homicidal thoughts.   Patient denies suicidal thoughts.  Patient appears to have good judgment and good insight.      Musculoskeletal: Patient shows no abnormalities of motor activity: there is no tremor, no tic, and no dystonia.  There is no apparent muscle " atrophy, strength and tone appear normal, and there are no abnormal movements.  Patient has normal gait and stance.     DISCUSSION:           Assessment:            Patient has been unable to stop using drugs in the community due to both physical and psychological symptoms.  Continued use will put the patient at risk for medical and/or psychiatric complications.        Inpatient psychiatric hospitalization is warranted at this time for safety, stabilization, and possible adjustment in medications.       Diagnoses:    Alcohol use disorder severe  Alcohol withdrawal severe          Plan:   Problem list  1#alcohol use disorder severe alcohol withdrawal severe     [facility] using Valium for management of alcohol withdrawal  Patient has a pulse of 94 blood pressure 157/108  He has eating disturbance tremor proximal sweats  He scored an 8 received 10 mg of diazepam  Since his admission is required 30 mg of diazepam    Elevated GGT 80 most likely from alcoholism      Patient takes Zyprexa Zoloft prescribed by his provider at [facility] for depression

## 2021-08-06 NOTE — PLAN OF CARE
Behavioral Team Discussion: (8/6/2021)    Continued Stay Criteria/Rationale: Patient admitted for alcohol withdrawal and Use Disorder.  Plan: The following services will be provided to the patient; psychiatric assessment, medication management, therapeutic milieu, individual and group support, and skills groups.   Participants: 3A Provider: Dr. Reyna Mcdaniels MD; 3A RN: Loc Rogers, RN; 3A CM's: Natalee Barrett .  Summary/Recommendation: Providers will assess today for treatment recommendations, discharge planning, and aftercare plans. CM will meet with pt for discharge planning.   Medical/Physical: none noted  Precautions:   Behavioral Orders   Procedures     Code 1 - Restrict to Unit     Routine Programming     As clinically indicated     Seizure precautions     Status 15     Every 15 minutes.     Withdrawal precautions     Rationale for change in precautions or plan: N/A  Progress: No Change.

## 2021-08-06 NOTE — CONSULTS
"  [facility]  Internal Medicine Consult     Martín Shoemaker MRN# [id number]   Age: 30 year old YOB: 1990     Date of Admission: 8/5/2021  Date of Consult:  8/6/2021    Primary Care Provider: No Ref-Primary, Physician    Requesting Service: Psychiatry  Reason for Consult: General Medical Evaluation         Assessment and Recommendations:   Martín Shoemaker is a 30 year old male with a hx of depression, HTN, PUD, and alcohol abuse who was admitted to [facility] station 3A seeking detox from alcohol.     # Alcohol abuse, dependence, acute withdrawal. Management per primary team, psychiatry. Lab workup unremarkable. BP elevated in the setting of acute withdrawal.  - Agree with MSSA using valium  - Agree with thiamine, folic acid, MVI  - Clonidine prn for SBP >170 and/or DBP >110    HTN. No PTA meds as typically normotensive when sober.  - Clonidine as above  - Notify IM if SBP consistently >140 after completing detox    PUD. Stable. Continue PTA protonix 40 mg daily.     Depression. PTA managed on sertraline 100 mg daily, trazodone 50-100mg at bedtime prn and zyprexa 10 mg at bedtime which were resumed on admission.       Internal Medicine will sign off at this time. Please notify if any intercurrent medical questions or concerns arise. Thank you for involving me in this patients care.         Dalia Miranda PA-C  Hospitalist Service  [phone]         Chief Complaint:   \"Alcohol\"         History of Present Illness:   Martín Shoemaker is a 30 year old male with a hx of depression, HTN, PUD, and alcohol abuse who was admitted to [facility] station 3A seeking detox from alcohol.     Of note, he was admitted for detox at outside [facility] 7/1 and was put into sober lodging 7/2-7/29. Unfortunately, he relapsed the very day he got out and was drinking for 3 days until he presented for his current binge. However, at the time of his presentation there were no detox beds, and patient declined alternative " locations. He returned with the same concerns for admission yesterday. He continued to drink in the amount of time between his initial visit on 8/2, and his repeat visit yesterday. He was last drinking at around 1:30 AM yesterday morning.      He previously was endorsing symptoms of nausea, vomiting, chills, but this was improved since admission. He has had an instance of alcohol withdrawal seizures. He states he has not had one since last year, and believes he has only ever had 2-3 total. He states he has had relapses upwards of a dozen times. He has been feeling symptoms of depression but denies any thoughts of suicidal ideation. He is extremely disappointed in himself for letting his family down.          Review of Systems:   A 10 point review of systems was performed and is negative unless otherwise noted in HPI.          Past Medical History:     Past Medical History:   Diagnosis Date     Depressive disorder      Hypertension     pt reports he takes meds for HTN     Substance abuse (H)      Ulcer, gastric, acute             Past Surgical History:      Past Surgical History:   Procedure Laterality Date     ADENOIDECTOMY       ENDOSCOPY       TONSILLECTOMY Bilateral              Family History:     Family History   Problem Relation Age of Onset     Hypertension Mother      Cerebrovascular Disease Maternal Grandfather      Cerebrovascular Disease Paternal Grandfather              Social History:     Social History     Tobacco Use     Smoking status: Light Tobacco Smoker     Smokeless tobacco: Current User     Types: Chew   Substance Use Topics     Alcohol use: Not Currently     Comment: binge, relapsed a few months ago             Medications:     Current Facility-Administered Medications   Medication     acetaminophen (TYLENOL) tablet 650 mg     alum & mag hydroxide-simethicone (MAALOX) suspension 30 mL     atenolol (TENORMIN) tablet 50 mg     diazepam (VALIUM) tablet 5-20 mg     ferrous sulfate (FEROSUL) tablet  "325 mg     folic acid (FOLVITE) tablet 1 mg     hydrOXYzine (ATARAX) tablet 25 mg     loperamide (IMODIUM) capsule 2 mg     multivitamin w/minerals (THERA-VIT-M) tablet 1 tablet     OLANZapine (zyPREXA) tablet 10 mg     ondansetron (ZOFRAN-ODT) ODT tab 4 mg     pantoprazole (PROTONIX) EC tablet 40 mg     senna-docusate (SENOKOT-S/PERICOLACE) 8.6-50 MG per tablet 1 tablet     sertraline (ZOLOFT) tablet 100 mg     thiamine (B-1) tablet 100 mg     traZODone (DESYREL) tablet  mg     Facility-Administered Medications Ordered in Other Encounters   Medication     Self Administer Medications: Behavioral Services            Allergies:   No Known Allergies          Physical Exam:   BP (!) 157/108   Pulse 94   Temp 98.2  F (36.8  C) (Temporal)   Resp 16   Ht 1.803 m (5' 11\")   Wt 90.7 kg (200 lb)   SpO2 95%   BMI 27.89 kg/m     GENERAL: Alert and oriented x 3. NAD.   HEENT: Anicteric sclera. Mucous membranes moist.   CV: RRR. S1, S2. No murmurs appreciated.   RESPIRATORY: Effort normal on room air. Lungs CTAB with no wheezing, rales, rhonchi.   GI: Abdomen soft and non distended with normoactive bowel sounds present in all quadrants. No tenderness, rebound, guarding.   MUSCULOSKELETAL: No joint swelling or tenderness. Moves all extremities.   NEUROLOGICAL: No focal deficits. Strength 5/5 bilaterally in upper and lower extremities.   EXTREMITIES: No peripheral edema. Intact bilateral pedal pulses.   SKIN: No jaundice. No rashes.          Labs:   CBC:  Recent Labs   Lab Test 08/05/21  1515   WBC 6.8   RBC 4.77   HGB 14.3   HCT 39.2*   MCV 82   MCH 30.0   MCHC 36.5   RDW 11.9          CMP:  Recent Labs   Lab Test 08/05/21  1515      POTASSIUM 3.4   CHLORIDE 106   EMILEE 8.1*   CO2 30   BUN 16   CR 0.66   *   AST 34   ALT 46   BILITOTAL 0.3   ALBUMIN 3.9   PROTTOTAL 7.1   ALKPHOS 68       INR:   Recent Labs   Lab Test 04/11/21  2345   INR 0.88           Dalia Miranda PA-C  Internal Medicine ANA " Hospitalist   [facility]   Pager: [phone]

## 2021-08-07 PROCEDURE — 250N000013 HC RX MED GY IP 250 OP 250 PS 637: Performed by: PHYSICIAN ASSISTANT

## 2021-08-07 PROCEDURE — 250N000013 HC RX MED GY IP 250 OP 250 PS 637: Performed by: PSYCHIATRY & NEUROLOGY

## 2021-08-07 PROCEDURE — 128N000004 HC R&B CD ADULT

## 2021-08-07 PROCEDURE — 250N000013 HC RX MED GY IP 250 OP 250 PS 637: Performed by: CLINICAL NURSE SPECIALIST

## 2021-08-07 RX ORDER — HYDRALAZINE HYDROCHLORIDE 10 MG/1
10 TABLET, FILM COATED ORAL EVERY 8 HOURS PRN
Status: DISCONTINUED | OUTPATIENT
Start: 2021-08-07 | End: 2021-08-08 | Stop reason: HOSPADM

## 2021-08-07 RX ADMIN — CLONIDINE HYDROCHLORIDE 0.1 MG: 0.1 TABLET ORAL at 08:44

## 2021-08-07 RX ADMIN — THIAMINE HCL TAB 100 MG 100 MG: 100 TAB at 08:44

## 2021-08-07 RX ADMIN — HYDRALAZINE HYDROCHLORIDE 10 MG: 10 TABLET, FILM COATED ORAL at 20:22

## 2021-08-07 RX ADMIN — ACETAMINOPHEN 650 MG: 325 TABLET, FILM COATED ORAL at 08:44

## 2021-08-07 RX ADMIN — HYDROXYZINE HYDROCHLORIDE 25 MG: 25 TABLET, FILM COATED ORAL at 16:46

## 2021-08-07 RX ADMIN — HYDROXYZINE HYDROCHLORIDE 25 MG: 25 TABLET, FILM COATED ORAL at 12:45

## 2021-08-07 RX ADMIN — PANTOPRAZOLE SODIUM 40 MG: 40 TABLET, DELAYED RELEASE ORAL at 08:44

## 2021-08-07 RX ADMIN — SERTRALINE HYDROCHLORIDE 100 MG: 100 TABLET, FILM COATED ORAL at 08:44

## 2021-08-07 RX ADMIN — MULTIPLE VITAMINS W/ MINERALS TAB 1 TABLET: TAB at 08:44

## 2021-08-07 RX ADMIN — FERROUS SULFATE TAB 325 MG (65 MG ELEMENTAL FE) 325 MG: 325 (65 FE) TAB at 08:44

## 2021-08-07 RX ADMIN — HYDROXYZINE HYDROCHLORIDE 25 MG: 25 TABLET, FILM COATED ORAL at 08:44

## 2021-08-07 RX ADMIN — OLANZAPINE 10 MG: 10 TABLET ORAL at 21:09

## 2021-08-07 RX ADMIN — HYDRALAZINE HYDROCHLORIDE 10 MG: 10 TABLET, FILM COATED ORAL at 12:45

## 2021-08-07 RX ADMIN — TRAZODONE HYDROCHLORIDE 100 MG: 50 TABLET ORAL at 21:09

## 2021-08-07 RX ADMIN — FOLIC ACID 1 MG: 1 TABLET ORAL at 08:44

## 2021-08-07 RX ADMIN — NICOTINE POLACRILEX 4 MG: 2 GUM, CHEWING BUCCAL at 18:46

## 2021-08-07 ASSESSMENT — ACTIVITIES OF DAILY LIVING (ADL)
HYGIENE/GROOMING: HANDWASHING;INDEPENDENT
HYGIENE/GROOMING: INDEPENDENT
LAUNDRY: WITH SUPERVISION
DRESS: INDEPENDENT
ORAL_HYGIENE: INDEPENDENT
ORAL_HYGIENE: INDEPENDENT
DRESS: SCRUBS (BEHAVIORAL HEALTH)

## 2021-08-07 NOTE — PROVIDER NOTIFICATION
Updated Mary with internal medicine about pt blood pressure before and after clonidine today. Pt denies any chest pain, shortness of breath or visual disturbances but remains hypertensive despite clonidine. Mary is placing orders.    Vitals:    08/07/21 0618 08/07/21 0813 08/07/21 1100 08/07/21 1152   BP: 117/78 (!) 158/122 (!) 143/104 (!) 158/117   Pulse: 54 74 70 97   Resp: 14 16  16   Temp: 97.4  F (36.3  C) 97.7  F (36.5  C)  97.6  F (36.4  C)   TempSrc: Temporal Temporal  Temporal   SpO2:  99%     Weight:       Height:

## 2021-08-07 NOTE — PROGRESS NOTES
Pt.scored 3 & 2 on MSSA monitoring. He slept through the night without any concerns. Will continue to monitor.

## 2021-08-07 NOTE — PLAN OF CARE
Pt MSSA score today is a 4, no valium indicated. Total valium administered since arrival to unit 3AW = 50 mg. Pt denies suicidal ideation plans or intent. Pt still appears a bit sad and flat in affect. Disappointed over relapse. Endorses sadness, loneliness and powerlessness. Denies any happiness or hopefulness. Encouraged pt to consider he is making steps in the right direction now and to give himself credit for that because deciding to come into the hospital is not always an easy decision.     Outcome: mild withdrawal requiring medication to treat. High level depression and anxiety but decreased in rating compared to yesterday (yesterday both were a 9 in rating).    Will continue to monitor and intervene as warranted.

## 2021-08-08 VITALS
OXYGEN SATURATION: 99 % | HEIGHT: 71 IN | TEMPERATURE: 97.5 F | HEART RATE: 81 BPM | BODY MASS INDEX: 28 KG/M2 | DIASTOLIC BLOOD PRESSURE: 84 MMHG | WEIGHT: 200 LBS | RESPIRATION RATE: 16 BRPM | SYSTOLIC BLOOD PRESSURE: 128 MMHG

## 2021-08-08 PROCEDURE — 250N000013 HC RX MED GY IP 250 OP 250 PS 637: Performed by: PHYSICIAN ASSISTANT

## 2021-08-08 PROCEDURE — 250N000013 HC RX MED GY IP 250 OP 250 PS 637: Performed by: CLINICAL NURSE SPECIALIST

## 2021-08-08 PROCEDURE — 99239 HOSP IP/OBS DSCHRG MGMT >30: CPT | Performed by: PSYCHIATRY & NEUROLOGY

## 2021-08-08 RX ORDER — PANTOPRAZOLE SODIUM 40 MG/1
40 TABLET, DELAYED RELEASE ORAL
Qty: 30 TABLET | Refills: 0 | Status: ON HOLD | OUTPATIENT
Start: 2021-08-08 | End: 2023-08-31

## 2021-08-08 RX ORDER — SERTRALINE HYDROCHLORIDE 100 MG/1
100 TABLET, FILM COATED ORAL DAILY
Qty: 30 TABLET | Refills: 0 | Status: ON HOLD | OUTPATIENT
Start: 2021-08-08 | End: 2023-08-31

## 2021-08-08 RX ORDER — HYDROXYZINE HYDROCHLORIDE 25 MG/1
25-50 TABLET, FILM COATED ORAL 3 TIMES DAILY PRN
Qty: 60 TABLET | Refills: 0 | Status: ON HOLD | OUTPATIENT
Start: 2021-08-08 | End: 2023-08-31

## 2021-08-08 RX ORDER — TRAZODONE HYDROCHLORIDE 50 MG/1
50-100 TABLET, FILM COATED ORAL
Qty: 60 TABLET | Refills: 0 | Status: ON HOLD | OUTPATIENT
Start: 2021-08-08 | End: 2023-08-31

## 2021-08-08 RX ORDER — LANOLIN ALCOHOL/MO/W.PET/CERES
100 CREAM (GRAM) TOPICAL DAILY
Qty: 30 TABLET | Refills: 0 | Status: ON HOLD | OUTPATIENT
Start: 2021-08-08 | End: 2023-08-31

## 2021-08-08 RX ADMIN — MULTIPLE VITAMINS W/ MINERALS TAB 1 TABLET: TAB at 08:45

## 2021-08-08 RX ADMIN — THIAMINE HCL TAB 100 MG 100 MG: 100 TAB at 08:45

## 2021-08-08 RX ADMIN — FOLIC ACID 1 MG: 1 TABLET ORAL at 08:45

## 2021-08-08 RX ADMIN — FERROUS SULFATE TAB 325 MG (65 MG ELEMENTAL FE) 325 MG: 325 (65 FE) TAB at 08:45

## 2021-08-08 RX ADMIN — PANTOPRAZOLE SODIUM 40 MG: 40 TABLET, DELAYED RELEASE ORAL at 08:45

## 2021-08-08 RX ADMIN — HYDRALAZINE HYDROCHLORIDE 10 MG: 10 TABLET, FILM COATED ORAL at 08:46

## 2021-08-08 RX ADMIN — SERTRALINE HYDROCHLORIDE 100 MG: 100 TABLET, FILM COATED ORAL at 08:45

## 2021-08-08 ASSESSMENT — ACTIVITIES OF DAILY LIVING (ADL)
DRESS: SCRUBS (BEHAVIORAL HEALTH)
LAUNDRY: WITH SUPERVISION
ORAL_HYGIENE: INDEPENDENT
HYGIENE/GROOMING: INDEPENDENT

## 2021-08-08 NOTE — PROGRESS NOTES
Pt.was observed to have slept well through the night. Staff reported pt.appeared comfortable in his sleep during 15 minutes safety checks. Will continue to monitor.

## 2021-08-08 NOTE — PROGRESS NOTES
Pt hypertensive this morning, 145/111. Prn hydralazine administered. Pt denies headache, visual disturbance, chest pain or shortness of breath. Mary with internal medicine updated. Will continue to monitor and intervene as indicated.       Vitals:    08/07/21 1642 08/07/21 1945 08/08/21 0821 08/08/21 1028   BP: (!) 153/93 (!) 157/116 (!) 145/111 128/84   BP Location:  Left arm     Pulse: 82 93 73 81   Resp: 16 16 16    Temp: 97.4  F (36.3  C) 97.5  F (36.4  C) 97.5  F (36.4  C)    TempSrc: Temporal Temporal Temporal    SpO2:  99% 99%    Weight:       Height:

## 2021-08-08 NOTE — PLAN OF CARE
"Problem: Alcohol Withdrawal  Goal: Alcohol Withdrawal Symptom Control  Outcome: Improving     Pt reports that his anxiety is 7/10 and received vistaril with some benefit,  depression 6/10, \"I let my parents down.\" His appetite is good, MSSA 5 not requiring valium so far this shift.  He is visible on the unit, social with peers, attending unit programming and denies any needs or concerns.    "

## 2021-08-08 NOTE — PLAN OF CARE
Problem: Alcohol Withdrawal  Goal: Alcohol Withdrawal Symptom Control  8/7/2021 2023 by Elizabeth Contreras, RN  Outcome: Completed    S: Patient scored less than 8 for greater than 24 hours. He has required no medication for withdrawal for > 24 hours.  B: Patient admitted for alcohol withdrawal and detoxification.  A: Patient stable in the alcohol withdrawal process MSSA scores < 8 for > 24 hours.  R: Patient removed from detox status per unit Protocol.

## 2021-08-08 NOTE — PROGRESS NOTES
Pt given copy of their discharge instructions and medication administration instructions. All discharge plans and labs were discussed with patient. Pt reports no questions at this time regarding discharge plans, labs or medications. Pt denies any suicidal ideation, plans or intent at this time. Patient discharge assisted via Nilsa ALAS directly to the lobby. Patient plan is to return home and begin phase II and resume working with his sponsor and AA support group meetings. Pt declined to make a follow up prior to discharge but states he will do so after he returns home. Encouraged him to be seen within 7-14 days. Pt asked about a blood pressure medication to take at home, encouraged him to discuss the necessity of this with his pcp at his follow up.  Patient is discharged at this time.

## 2021-08-08 NOTE — DISCHARGE INSTRUCTIONS
Behavioral Discharge Planning and Instructions  THANK YOU FOR CHOOSING [address]  [phone]    Summary: You were admitted to [facility] on 8/6/2021 for detoxification from alcohol.  A medical exam was performed that included lab work. You have met with a  and opted to resume your after care plan of attending Phase 2, 3 meetings per week, and working with your sponsor.  Please take care and make your recovery a daily priority, Peter!  It was a pleasure working with you and the entire treatment team here wishes you the very best in your recovery!     Recommendation:  Follow your after care plan and work with your sponsor    Main Diagnosis:  Per Dr. Reyna Mcdaniels MD;  303.90 (F10.20) Alcohol Use Disorder Severe    Major Treatments, Procedures and Findings:  You were treated for alcohol detoxification using valium administered based on the [facility] protocol. You did not need a chemical dependency assessment. You had labs drawn and those results were reviewed with you. Please take a copy of your lab work with you to your next primary care provider appointment.    Symptoms to Report:  If you experience more anxiety, confusion, sleeplessness, deep sadness or thoughts of suicide, notify your treatment team or notify your primary care provider. IF ANY OF THE SYMPTOMS YOU ARE EXPERIENCING ARE A MEDICAL EMERGENCY CALL 911 IMMEDIATELY.     Lifestyle Adjustment: Adjust your lifestyle to get enough sleep, relaxation, exercise and good nutrition. Continue to develop healthy coping skills to decrease stress and promote a sober living environment. Do not use mood altering substances including alcohol, illegal drugs or addictive medications other than what is currently prescribed.     Disposition: Home    Facts about COVID19 at www.cdc.gov/COVID19 and www.[place].gov/covid19    Keeping hands clean is one of the most important steps we can take to avoid getting sick and spreading germs to others.  Please wash  your hands frequently and lather with soap for at least 20 seconds!    Follow-up Appointment:   You declined to set a follow up appointment prior to discharge stating you will do so once you return home. Please be seen within 1-2 weeks.    Recovery apps for your phone to locate current in person and zoom recovery meetings  [place] - meeting dereje  AA  - meeting dereje  Meeting guide - meeting dereje  Quick NA meeting - meeting dereje  Patti- has various apps    Resources:  *due to covid-19 most AA/NA meetings are being held online*  AA meetings online search for them at: https://aa-intergroup.org (worldwide meeting listings)  AA meetings for [place] area can be found online at: https://a[place].org (click local online meetings listings)  NA meetings for [place] area can be found online at: https://www.nam[place].org  (click find a meeting)  AA and NA Sponsors are excellent resources for support and you can find one at any support group meeting.   Alcoholics Anonymous (https://aa.org/): for information 24 hours/day  AA Intergroup service office in [place] (http://www.aastpaul.org/) 543.711.9204  AA Intergroup service office in [facility]: [phone]. (http://www.aaminneaSmart Devicesis.org/)  Narcotics Anonymous (www.nam[place].org) (437) 660-5616  https://aafairviewriverside.org/meetings  SMART Recovery - self management for addiction recovery:  www.smartrecovery.org  Pathways ~ A Health Crisis Resource & Support Center:  869.143.2156.  https://prescribetoprevent.org/patient-education/videos/  http://www.harmreduction.org  [facility] 551-915-3331  Support Group:  AA/NA and Sponsor/support.  National [place] on [place] Illness (www.[place].chasity.org): 983.810.7273 or 879-436-2506.  Alcoholics Anonymous (www.alcoholics-anonymous.org): Check your phone book for your local chapter.  Suicide Awareness Voices of Education (SAVE) (www.save.org): 258-174-DXDO (8724)  National Suicide Prevention Line  (www.mentalhealthmn.org): 888-625-BQNE (8748)  Mental Health Consumer/Survivor Network of [place] (www.mhcsn.net): 432.875.8271 or 763-279-3682  Mental Health Association of [place] (www.mentalhealth.org): 542.424.9890 or 351-974-8313   Substance Abuse and Mental Health Services (www.sama.gov)  [place] Opioid Prevention Coalition: www.opioidcoalition.org    [place] Recovery Connection (MRC)  [facility] connects people seeking recovery to resources that help foster and sustain long-term recovery.  Whether you are seeking resources for treatment, transportation, housing, job training, education, health care or other pathways to recovery, [facility] is a great place to start.  537.612.1442.  www.[employer] Pod casts for nutrition and wellness  Listen on Apple Podcasts  Dishing [employer] Nutrition   [employer]   Nutrition       Understand the connection between what you eat and how you feel. Hosted by licensed nutritionists and dietitians from [employer] we share practical, real-life solutions for healthier living through nutrition.     General Medication Instructions:   See your medication sheet(s) for instructions.   Take all medications as prescribed.  Make no changes unless your primary care provider suggests them.   Go to all your primary care provider visits.  Be sure to have all your required lab tests. This way, your medicines can be refilled on time.  Do not use any forms of alcohol.    Please Note:  If you have any questions at anytime after you are discharged please call [facility] detox unit 3AW at [phone].  [facility], Behavioral Intake [phone]  Medical Records call [phone]  Outpatient Behavioral Intake call [phone]  LP+ Wait List/Bed Availability call [phone]    Please remember to take all of your behavioral discharge planning and lab paperwork to any follow up appointments, it contains your lab results, diagnosis, medication  list and discharge recommendations.      THANK YOU FOR CHOOSING [facility]

## 2021-08-08 NOTE — DISCHARGE SUMMARY
Psychiatric Discharge Summary    Martín Shoemaker MRN# [id number]   Age: 30 year old YOB: 1990     Date of Admission:  8/5/2021  Date of Discharge:  8/8/2021  1:23 PM  Admitting Physician:  Renya Mcdaniels MD  Discharge Physician:  Mildred Mejias DO         Event Leading to Hospitalization:   Martín Shoemaker is a 30 year old male.Patient has a past medical history significant for substance abuse, alcohol withdrawals including seizures and DTs, gastric ulcer, HTN, and depression.      Patient is known to me from previous admissions.  He was in [facility]ging plus  He left lodging plus and was sober for 2 or 3 days and then relapsed he came to the emergency room wanting help.  He reports his been drinking daily for the past week about 750 mm of vodka     Patient has been using the following substances: Alcohol  Started at age 13 or 58, became a problem at 1 years ago after a bad break-up.     He does have a history of withdrawal seizures this is what prompted him to come get help     Patient has tolerance, withdrawal, progressive use, loss of control, spending more time and more amount than intended. Patient has made attempts to quit, is experiencing cravings, and reports negative consequences.                    Patient does have a history of seizures.  Patient does not have a history of delirium tremens.                    Denies thoughts of suicide or harming others.       Denies auditory or visual hallucinations.      Patient smokes 0 cigarettes he denies any illicit drug use        Patient denied any gambling          See Admission note by Reyna Mcdaniels MD on 8/6/21 for additional details.          Diagnoses:     Alcohol use disorder, severe  Alcohol withdrawal, severe, resolved  Major Depressive Disorder, recurrent, moderate  Elevated BP, likely 2/2 withdrawal  Peptic Ulcer Disease         Labs:     Recent Results (from the past 168 hour(s))   Alcohol breath test POCT    Collection Time:  08/02/21  2:57 PM   Result Value Ref Range    Alcohol Breath Test 0.346 (A) 0.00 - 0.01   Drug abuse screen 1 urine (ED)    Collection Time: 08/02/21  4:10 PM   Result Value Ref Range    Amphetamines Urine Screen Negative Screen Negative    Barbiturates Urine Screen Negative Screen Negative    Benzodiazepines Urine Screen Negative Screen Negative    Cannabinoids Urine Screen Positive (A) Screen Negative    Cocaine Urine Screen Negative Screen Negative    Opiates Urine Screen Negative Screen Negative   Alcohol breath test POCT    Collection Time: 08/05/21  2:38 PM   Result Value Ref Range    Alcohol Breath Test 0.208 (A) 0.00 - 0.01   Comprehensive metabolic panel    Collection Time: 08/05/21  3:15 PM   Result Value Ref Range    Sodium 142 133 - 144 mmol/L    Potassium 3.4 3.4 - 5.3 mmol/L    Chloride 106 94 - 109 mmol/L    Carbon Dioxide (CO2) 30 20 - 32 mmol/L    Anion Gap 6 3 - 14 mmol/L    Urea Nitrogen 16 7 - 30 mg/dL    Creatinine 0.66 0.66 - 1.25 mg/dL    Calcium 8.1 (L) 8.5 - 10.1 mg/dL    Glucose 113 (H) 70 - 99 mg/dL    Alkaline Phosphatase 68 40 - 150 U/L    AST 34 0 - 45 U/L    ALT 46 0 - 70 U/L    Protein Total 7.1 6.8 - 8.8 g/dL    Albumin 3.9 3.4 - 5.0 g/dL    Bilirubin Total 0.3 0.2 - 1.3 mg/dL    GFR Estimate >90 >60 mL/min/1.73m2   Magnesium    Collection Time: 08/05/21  3:15 PM   Result Value Ref Range    Magnesium 1.8 1.6 - 2.3 mg/dL   CBC with platelets and differential    Collection Time: 08/05/21  3:15 PM   Result Value Ref Range    WBC Count 6.8 4.0 - 11.0 10e3/uL    RBC Count 4.77 4.40 - 5.90 10e6/uL    Hemoglobin 14.3 13.3 - 17.7 g/dL    Hematocrit 39.2 (L) 40.0 - 53.0 %    MCV 82 78 - 100 fL    MCH 30.0 26.5 - 33.0 pg    MCHC 36.5 31.5 - 36.5 g/dL    RDW 11.9 10.0 - 15.0 %    Platelet Count 177 150 - 450 10e3/uL    % Neutrophils 53 %    % Lymphocytes 34 %    % Monocytes 8 %    % Eosinophils 3 %    % Basophils 1 %    % Immature Granulocytes 1 %    NRBCs per 100 WBC 0 <1 /100    Absolute  Neutrophils 3.7 1.6 - 8.3 10e3/uL    Absolute Lymphocytes 2.3 0.8 - 5.3 10e3/uL    Absolute Monocytes 0.5 0.0 - 1.3 10e3/uL    Absolute Eosinophils 0.2 0.0 - 0.7 10e3/uL    Absolute Basophils 0.0 0.0 - 0.2 10e3/uL    Absolute Immature Granulocytes 0.1 (H) <=0.0 10e3/uL    Absolute NRBCs 0.0 10e3/uL   Drug abuse screen 1 urine (ED)    Collection Time: 08/05/21  3:16 PM   Result Value Ref Range    Amphetamines Urine Screen Negative Screen Negative    Barbiturates Urine Screen Negative Screen Negative    Benzodiazepines Urine Screen Negative Screen Negative    Cannabinoids Urine Screen Negative Screen Negative    Cocaine Urine Screen Negative Screen Negative    Opiates Urine Screen Negative Screen Negative   SARS-COV2 (COVID-19) Virus RT-PCR    Collection Time: 08/05/21  3:17 PM    Specimen: Nasopharyngeal; Swab   Result Value Ref Range    SARS CoV2 PCR Negative Negative   GGT    Collection Time: 08/06/21  8:20 AM   Result Value Ref Range    GGT 80 (H) 0 - 75 U/L   TSH with free T4 reflex and/or T3 as indicated    Collection Time: 08/06/21  8:20 AM   Result Value Ref Range    TSH 1.99 0.40 - 4.00 mU/L   Folate    Collection Time: 08/06/21  8:20 AM   Result Value Ref Range    Folic Acid 43.1 >=5.4 ng/mL              Consults:   [facility]  Internal Medicine Consult      Martín Shoemaker MRN# [id number]   Age: 30 year old YOB: 1990      Date of Admission:       8/5/2021  Date of Consult:            8/6/2021     Primary Care Provider: No Ref-Primary, Physician     Requesting Service: Psychiatry  Reason for Consult: General Medical Evaluation          Assessment and Recommendations:   Martín Shoemaker is a 30 year old male with a hx of depression, HTN, PUD, and alcohol abuse who was admitted to [facility] seeking detox from alcohol.      # Alcohol abuse, dependence, acute withdrawal. Management per primary team, psychiatry. Lab workup unremarkable. BP elevated in the setting of acute  withdrawal.  - Agree with MSSA using valium  - Agree with thiamine, folic acid, MVI  - Clonidine prn for SBP >170 and/or DBP >110     HTN. No PTA meds as typically normotensive when sober.  - Clonidine as above  - Notify IM if SBP consistently >140 after completing detox     PUD. Stable. Continue PTA protonix 40 mg daily.      Depression. PTA managed on sertraline 100 mg daily, trazodone 50-100mg at bedtime prn and zyprexa 10 mg at bedtime which were resumed on admission.         Internal Medicine will sign off at this time. Please notify if any intercurrent medical questions or concerns arise. Thank you for involving me in this patients care.            Dalia Miranda PA-C  Hospitalist Service           [facility] Course:   Martín Shoemaker was admitted to Station [facility] W with attending Reyna Mcdaniels MD as a voluntary patient. The patient was placed under status 15 (15 minute checks) to ensure patient safety. Labs obtained as above.  MSSA protocol was initiated due to the patient's history of alcohol abuse and concern for withdrawal symptoms.    All outpatient medications were continued. Internal medicine consult was completed.     Martín Shoemaker did participate in groups and was visible in the milieu.     The patient's symptoms of alcohol withdrawal improved. He required 50mg of diazepam per MSSA protocol and was considered medically out of detox on 8/8 and requested discharge. He had denied thoughts of harming self or others throughout admission. Reviewed medications for discharge, he states he does not need to continue to take olanzapine, it was prescribed during period of substance induced agitation and paranoia, denies previous history of psychosis or further episodes. He plans to discontinue upon discharge so it was not ordered, understands risk of repeat episode may occur with stopping medication.     Today Martín Shoemaker reports having no SI or HI. In addition, he has notable risk factors for self-harm,  including age, single status and substance abuse. However, risk is mitigated by commitment to family, absence of past attempts, ability to volunteer a safety plan and history of seeking help when needed. Therefore, based on all available evidence including the factors cited above, he does not appear to be at imminent risk for self-harm, does not meet criteria for a 72-hr hold, and therefore remains appropriate for ongoing outpatient level of care.     Martín Shoemaker was discharged to home. At the time of discharge Martín Shoemaker was determined to not be a danger to himself or others.          Discharge Medications:     Current Discharge Medication List      CONTINUE these medications which have CHANGED    Details   hydrOXYzine (ATARAX) 25 MG tablet Take 1-2 tablets (25-50 mg) by mouth 3 times daily as needed for anxiety  Qty: 60 tablet, Refills: 0    Associated Diagnoses: Anxiety and depression      pantoprazole (PROTONIX) 40 MG EC tablet Take 1 tablet (40 mg) by mouth every morning (before breakfast)  Qty: 30 tablet, Refills: 0    Associated Diagnoses: Alcohol dependence with uncomplicated intoxication (H)      sertraline (ZOLOFT) 100 MG tablet Take 1 tablet (100 mg) by mouth daily  Qty: 30 tablet, Refills: 0    Associated Diagnoses: Alcohol dependence with uncomplicated intoxication (H)      thiamine (B-1) 100 MG tablet Take 1 tablet (100 mg) by mouth daily  Qty: 30 tablet, Refills: 0    Associated Diagnoses: Alcoholism (H)      traZODone (DESYREL) 50 MG tablet Take 1-2 tablets ( mg) by mouth nightly as needed for sleep  Qty: 60 tablet, Refills: 0    Associated Diagnoses: Alcohol dependence with uncomplicated intoxication (H)         CONTINUE these medications which have NOT CHANGED    Details   ferrous sulfate (FE TABS) 325 (65 Fe) MG EC tablet Take 1 tablet (325 mg) by mouth daily  Qty: 30 tablet, Refills: 0    Associated Diagnoses: Anemia, unspecified type      folic acid (FOLVITE) 1 MG tablet Take 1  "tablet (1 mg) by mouth daily  Qty: 30 tablet, Refills: 0    Associated Diagnoses: Alcoholism (H)      multivitamin, therapeutic (THERA-VIT) TABS tablet Take 1 tablet by mouth daily  Qty: 30 tablet, Refills: 0    Associated Diagnoses: Alcoholism (H)         STOP taking these medications       OLANZapine (ZYPREXA) 10 MG tablet Comments:   Reason for Stopping:                    Psychiatric Examination:   Appearance:  awake, alert and adequately groomed  Attitude:  cooperative  Eye Contact:  good  Mood:  \"pretty good\"  Affect:  appropriate and in normal range and mood congruent  Speech:  clear, coherent and normal prosody  Psychomotor Behavior:  no evidence of tardive dyskinesia, dystonia, or tics  Thought Process:  logical, linear and goal oriented  Associations:  no loose associations  Thought Content:  no evidence of suicidal ideation or homicidal ideation and no evidence of psychotic thought  Insight:  partial  Judgment:  fair, adequate for safety   Oriented to:  time, person, and place  Attention Span and Concentration:  intact  Recent and Remote Memory:  intact  Language: English, fluent  Fund of Knowledge: appropriate  Muscle Strength and Tone: normal  Gait and Station: Normal         Discharge Plan:   Summary: You were admitted to [facility] on 8/6/2021 for detoxification from alcohol.  A medical exam was performed that included lab work. You have met with a  and opted to resume your after care plan of attending Phase 2, 3 meetings per week, and working with your sponsor.  Please take care and make your recovery a daily priority, Peter!  It was a pleasure working with you and the entire treatment team here wishes you the very best in your recovery!      Recommendation:  Follow your after care plan and work with your sponsor     Main Diagnosis:  Per Dr. Reyna Mcdaniels MD;  303.90 (F10.20) Alcohol Use Disorder Severe     Major Treatments, Procedures and Findings:  You were treated for alcohol " detoxification using valium administered based on the [facility] protocol. You did not need a chemical dependency assessment. You had labs drawn and those results were reviewed with you. Please take a copy of your lab work with you to your next primary care provider appointment.     Symptoms to Report:  If you experience more anxiety, confusion, sleeplessness, deep sadness or thoughts of suicide, notify your treatment team or notify your primary care provider. IF ANY OF THE SYMPTOMS YOU ARE EXPERIENCING ARE A MEDICAL EMERGENCY CALL 911 IMMEDIATELY.      Lifestyle Adjustment: Adjust your lifestyle to get enough sleep, relaxation, exercise and good nutrition. Continue to develop healthy coping skills to decrease stress and promote a sober living environment. Do not use mood altering substances including alcohol, illegal drugs or addictive medications other than what is currently prescribed.      Disposition: Home     Facts about COVID19 at www.cdc.gov/COVID19 and www.[place].gov/covid19     Keeping hands clean is one of the most important steps we can take to avoid getting sick and spreading germs to others.  Please wash your hands frequently and lather with soap for at least 20 seconds!     Follow-up Appointment:   You declined to set a follow up appointment prior to discharge stating you will do so once you return home. Please be seen within 1-2 weeks.       Attestation:  Patient has been seen and evaluated by me, Mildred Mejias DO on day of discharge. 33 minutes were spent in coordination of discharge planning.

## 2021-10-04 ENCOUNTER — HOSPITAL ENCOUNTER (OUTPATIENT)
Facility: CLINIC | Age: 31
Setting detail: OBSERVATION
LOS: 1 days | Discharge: HOME | End: 2021-10-06
Attending: EMERGENCY MEDICINE | Admitting: INTERNAL MEDICINE
Payer: COMMERCIAL

## 2021-10-04 DIAGNOSIS — F10.930 ALCOHOL WITHDRAWAL SYNDROME WITHOUT COMPLICATION (H): ICD-10-CM

## 2021-10-04 DIAGNOSIS — F10.220 ALCOHOL DEPENDENCE WITH UNCOMPLICATED INTOXICATION (H): ICD-10-CM

## 2021-10-04 DIAGNOSIS — K85.20 ALCOHOL-INDUCED ACUTE PANCREATITIS, UNSPECIFIED COMPLICATION STATUS: ICD-10-CM

## 2021-10-04 DIAGNOSIS — K85.20 ALCOHOL-INDUCED ACUTE PANCREATITIS WITHOUT INFECTION OR NECROSIS: ICD-10-CM

## 2021-10-04 DIAGNOSIS — K70.10 ACUTE ALCOHOLIC HEPATITIS (H): ICD-10-CM

## 2021-10-04 DIAGNOSIS — Z11.52 ENCOUNTER FOR SCREENING LABORATORY TESTING FOR SEVERE ACUTE RESPIRATORY SYNDROME CORONAVIRUS 2 (SARS-COV-2): ICD-10-CM

## 2021-10-04 DIAGNOSIS — K70.10 ALCOHOLIC HEPATITIS WITHOUT ASCITES (H): ICD-10-CM

## 2021-10-04 DIAGNOSIS — F10.10 ALCOHOL ABUSE: ICD-10-CM

## 2021-10-04 LAB
ALBUMIN SERPL-MCNC: 4.7 G/DL (ref 3.4–5)
ALCOHOL BREATH TEST: 0.32 (ref 0–0.01)
ALP SERPL-CCNC: 116 U/L (ref 40–150)
ALT SERPL W P-5'-P-CCNC: 169 U/L (ref 0–70)
ANION GAP SERPL CALCULATED.3IONS-SCNC: 12 MMOL/L (ref 3–14)
AST SERPL W P-5'-P-CCNC: 266 U/L (ref 0–45)
BASOPHILS # BLD AUTO: 0.1 10E3/UL (ref 0–0.2)
BASOPHILS NFR BLD AUTO: 1 %
BILIRUB SERPL-MCNC: 1 MG/DL (ref 0.2–1.3)
BUN SERPL-MCNC: 12 MG/DL (ref 7–30)
CALCIUM SERPL-MCNC: 9.1 MG/DL (ref 8.5–10.1)
CHLORIDE BLD-SCNC: 94 MMOL/L (ref 94–109)
CO2 SERPL-SCNC: 29 MMOL/L (ref 20–32)
CREAT SERPL-MCNC: 0.76 MG/DL (ref 0.66–1.25)
EOSINOPHIL # BLD AUTO: 0 10E3/UL (ref 0–0.7)
EOSINOPHIL NFR BLD AUTO: 0 %
ERYTHROCYTE [DISTWIDTH] IN BLOOD BY AUTOMATED COUNT: 13.1 % (ref 10–15)
GFR SERPL CREATININE-BSD FRML MDRD: >90 ML/MIN/1.73M2
GLUCOSE BLD-MCNC: 144 MG/DL (ref 70–99)
HCT VFR BLD AUTO: 48.1 % (ref 40–53)
HGB BLD-MCNC: 17.6 G/DL (ref 13.3–17.7)
IMM GRANULOCYTES # BLD: 0 10E3/UL
IMM GRANULOCYTES NFR BLD: 0 %
LIPASE SERPL-CCNC: 429 U/L (ref 73–393)
LYMPHOCYTES # BLD AUTO: 2.3 10E3/UL (ref 0.8–5.3)
LYMPHOCYTES NFR BLD AUTO: 33 %
MCH RBC QN AUTO: 30.9 PG (ref 26.5–33)
MCHC RBC AUTO-ENTMCNC: 36.6 G/DL (ref 31.5–36.5)
MCV RBC AUTO: 85 FL (ref 78–100)
MONOCYTES # BLD AUTO: 0.5 10E3/UL (ref 0–1.3)
MONOCYTES NFR BLD AUTO: 7 %
NEUTROPHILS # BLD AUTO: 4.1 10E3/UL (ref 1.6–8.3)
NEUTROPHILS NFR BLD AUTO: 59 %
NRBC # BLD AUTO: 0 10E3/UL
NRBC BLD AUTO-RTO: 0 /100
PLATELET # BLD AUTO: 239 10E3/UL (ref 150–450)
POTASSIUM BLD-SCNC: 3.4 MMOL/L (ref 3.4–5.3)
PROT SERPL-MCNC: 8.5 G/DL (ref 6.8–8.8)
RBC # BLD AUTO: 5.69 10E6/UL (ref 4.4–5.9)
SARS-COV-2 RNA RESP QL NAA+PROBE: NEGATIVE
SODIUM SERPL-SCNC: 135 MMOL/L (ref 133–144)
WBC # BLD AUTO: 6.9 10E3/UL (ref 4–11)

## 2021-10-04 PROCEDURE — 36415 COLL VENOUS BLD VENIPUNCTURE: CPT | Performed by: EMERGENCY MEDICINE

## 2021-10-04 PROCEDURE — 99285 EMERGENCY DEPT VISIT HI MDM: CPT | Mod: 25 | Performed by: EMERGENCY MEDICINE

## 2021-10-04 PROCEDURE — U0003 INFECTIOUS AGENT DETECTION BY NUCLEIC ACID (DNA OR RNA); SEVERE ACUTE RESPIRATORY SYNDROME CORONAVIRUS 2 (SARS-COV-2) (CORONAVIRUS DISEASE [COVID-19]), AMPLIFIED PROBE TECHNIQUE, MAKING USE OF HIGH THROUGHPUT TECHNOLOGIES AS DESCRIBED BY CMS-2020-01-R: HCPCS | Performed by: EMERGENCY MEDICINE

## 2021-10-04 PROCEDURE — 93010 ELECTROCARDIOGRAM REPORT: CPT | Performed by: EMERGENCY MEDICINE

## 2021-10-04 PROCEDURE — 85025 COMPLETE CBC W/AUTO DIFF WBC: CPT | Performed by: EMERGENCY MEDICINE

## 2021-10-04 PROCEDURE — 80053 COMPREHEN METABOLIC PANEL: CPT | Performed by: EMERGENCY MEDICINE

## 2021-10-04 PROCEDURE — 83690 ASSAY OF LIPASE: CPT | Performed by: EMERGENCY MEDICINE

## 2021-10-04 PROCEDURE — C9803 HOPD COVID-19 SPEC COLLECT: HCPCS | Performed by: EMERGENCY MEDICINE

## 2021-10-04 PROCEDURE — 99291 CRITICAL CARE FIRST HOUR: CPT | Mod: 25 | Performed by: EMERGENCY MEDICINE

## 2021-10-04 PROCEDURE — 93005 ELECTROCARDIOGRAM TRACING: CPT | Performed by: EMERGENCY MEDICINE

## 2021-10-04 PROCEDURE — 96361 HYDRATE IV INFUSION ADD-ON: CPT | Performed by: EMERGENCY MEDICINE

## 2021-10-04 PROCEDURE — 250N000011 HC RX IP 250 OP 636: Performed by: EMERGENCY MEDICINE

## 2021-10-04 PROCEDURE — 258N000003 HC RX IP 258 OP 636: Performed by: EMERGENCY MEDICINE

## 2021-10-04 PROCEDURE — 82075 ASSAY OF BREATH ETHANOL: CPT | Performed by: EMERGENCY MEDICINE

## 2021-10-04 PROCEDURE — 96374 THER/PROPH/DIAG INJ IV PUSH: CPT | Performed by: EMERGENCY MEDICINE

## 2021-10-04 RX ORDER — ONDANSETRON 2 MG/ML
4 INJECTION INTRAMUSCULAR; INTRAVENOUS ONCE
Status: COMPLETED | OUTPATIENT
Start: 2021-10-04 | End: 2021-10-04

## 2021-10-04 RX ADMIN — SODIUM CHLORIDE 1000 ML: 9 INJECTION, SOLUTION INTRAVENOUS at 22:09

## 2021-10-04 RX ADMIN — ONDANSETRON 4 MG: 2 INJECTION INTRAMUSCULAR; INTRAVENOUS at 21:59

## 2021-10-04 ASSESSMENT — ENCOUNTER SYMPTOMS
TREMORS: 1
VOMITING: 1
NAUSEA: 1
BACK PAIN: 1

## 2021-10-05 PROBLEM — K70.10 ALCOHOLIC HEPATITIS WITHOUT ASCITES (H): Status: ACTIVE | Noted: 2021-10-05

## 2021-10-05 PROBLEM — K85.20 ALCOHOL-INDUCED ACUTE PANCREATITIS, UNSPECIFIED COMPLICATION STATUS: Status: ACTIVE | Noted: 2021-10-05

## 2021-10-05 PROBLEM — F10.10 ALCOHOL ABUSE: Status: ACTIVE | Noted: 2021-10-05

## 2021-10-05 LAB
AMPHETAMINES UR QL SCN: ABNORMAL
ATRIAL RATE - MUSE: 103 BPM
BARBITURATES UR QL: ABNORMAL
BENZODIAZ UR QL: ABNORMAL
CANNABINOIDS UR QL SCN: ABNORMAL
CANNABINOIDS UR QL SCN: NORMAL
COCAINE UR QL: ABNORMAL
CREAT SERPL-MCNC: 0.72 MG/DL (ref 0.66–1.25)
CREAT UR-MCNC: 162 MG/DL
DIASTOLIC BLOOD PRESSURE - MUSE: NORMAL MMHG
GFR SERPL CREATININE-BSD FRML MDRD: >90 ML/MIN/1.73M2
INTERPRETATION ECG - MUSE: NORMAL
OPIATES UR QL SCN: ABNORMAL
P AXIS - MUSE: 49 DEGREES
PR INTERVAL - MUSE: 168 MS
QRS DURATION - MUSE: 90 MS
QT - MUSE: 370 MS
QTC - MUSE: 484 MS
R AXIS - MUSE: 65 DEGREES
SYSTOLIC BLOOD PRESSURE - MUSE: NORMAL MMHG
T AXIS - MUSE: 49 DEGREES
VENTRICULAR RATE- MUSE: 103 BPM

## 2021-10-05 PROCEDURE — 96376 TX/PRO/DX INJ SAME DRUG ADON: CPT | Performed by: EMERGENCY MEDICINE

## 2021-10-05 PROCEDURE — 96375 TX/PRO/DX INJ NEW DRUG ADDON: CPT | Performed by: EMERGENCY MEDICINE

## 2021-10-05 PROCEDURE — 36415 COLL VENOUS BLD VENIPUNCTURE: CPT | Performed by: PEDIATRICS

## 2021-10-05 PROCEDURE — 99223 1ST HOSP IP/OBS HIGH 75: CPT | Mod: AI | Performed by: PEDIATRICS

## 2021-10-05 PROCEDURE — 82570 ASSAY OF URINE CREATININE: CPT | Mod: XU | Performed by: PEDIATRICS

## 2021-10-05 PROCEDURE — 250N000011 HC RX IP 250 OP 636: Performed by: PEDIATRICS

## 2021-10-05 PROCEDURE — 82565 ASSAY OF CREATININE: CPT | Performed by: PEDIATRICS

## 2021-10-05 PROCEDURE — 96361 HYDRATE IV INFUSION ADD-ON: CPT | Performed by: EMERGENCY MEDICINE

## 2021-10-05 PROCEDURE — 250N000013 HC RX MED GY IP 250 OP 250 PS 637: Performed by: PEDIATRICS

## 2021-10-05 PROCEDURE — 80307 DRUG TEST PRSMV CHEM ANLYZR: CPT | Performed by: PEDIATRICS

## 2021-10-05 PROCEDURE — 120N000002 HC R&B MED SURG/OB UMMC

## 2021-10-05 PROCEDURE — 250N000013 HC RX MED GY IP 250 OP 250 PS 637: Performed by: EMERGENCY MEDICINE

## 2021-10-05 PROCEDURE — 258N000003 HC RX IP 258 OP 636: Performed by: EMERGENCY MEDICINE

## 2021-10-05 PROCEDURE — 99207 PR CDG-CODE CATEGORY CHANGED: CPT | Performed by: PEDIATRICS

## 2021-10-05 PROCEDURE — 80307 DRUG TEST PRSMV CHEM ANLYZR: CPT | Performed by: EMERGENCY MEDICINE

## 2021-10-05 PROCEDURE — 250N000011 HC RX IP 250 OP 636: Performed by: EMERGENCY MEDICINE

## 2021-10-05 RX ORDER — PROCHLORPERAZINE MALEATE 5 MG
10 TABLET ORAL EVERY 6 HOURS PRN
Status: DISCONTINUED | OUTPATIENT
Start: 2021-10-05 | End: 2021-10-06 | Stop reason: HOSPADM

## 2021-10-05 RX ORDER — MULTIVITAMIN,THERAPEUTIC
1 TABLET ORAL DAILY
Status: DISCONTINUED | OUTPATIENT
Start: 2021-10-05 | End: 2021-10-05

## 2021-10-05 RX ORDER — HYDROXYZINE HYDROCHLORIDE 25 MG/1
25-50 TABLET, FILM COATED ORAL 3 TIMES DAILY PRN
Status: DISCONTINUED | OUTPATIENT
Start: 2021-10-05 | End: 2021-10-06 | Stop reason: HOSPADM

## 2021-10-05 RX ORDER — FOLIC ACID 1 MG/1
1 TABLET ORAL DAILY
Status: DISCONTINUED | OUTPATIENT
Start: 2021-10-05 | End: 2021-10-06 | Stop reason: HOSPADM

## 2021-10-05 RX ORDER — MULTIPLE VITAMINS W/ MINERALS TAB 9MG-400MCG
1 TAB ORAL DAILY
Status: DISCONTINUED | OUTPATIENT
Start: 2021-10-05 | End: 2021-10-06 | Stop reason: HOSPADM

## 2021-10-05 RX ORDER — PANTOPRAZOLE SODIUM 40 MG/1
40 TABLET, DELAYED RELEASE ORAL
Status: DISCONTINUED | OUTPATIENT
Start: 2021-10-06 | End: 2021-10-05

## 2021-10-05 RX ORDER — LANOLIN ALCOHOL/MO/W.PET/CERES
100 CREAM (GRAM) TOPICAL DAILY
Status: DISCONTINUED | OUTPATIENT
Start: 2021-10-05 | End: 2021-10-06 | Stop reason: HOSPADM

## 2021-10-05 RX ORDER — SODIUM CHLORIDE 9 MG/ML
INJECTION, SOLUTION INTRAVENOUS CONTINUOUS
Status: DISCONTINUED | OUTPATIENT
Start: 2021-10-05 | End: 2021-10-06 | Stop reason: CLARIF

## 2021-10-05 RX ORDER — ONDANSETRON 2 MG/ML
4 INJECTION INTRAMUSCULAR; INTRAVENOUS ONCE
Status: COMPLETED | OUTPATIENT
Start: 2021-10-05 | End: 2021-10-05

## 2021-10-05 RX ORDER — DIAZEPAM 5 MG
5-20 TABLET ORAL EVERY 30 MIN PRN
Status: DISCONTINUED | OUTPATIENT
Start: 2021-10-05 | End: 2021-10-05

## 2021-10-05 RX ORDER — ONDANSETRON 4 MG/1
4 TABLET, ORALLY DISINTEGRATING ORAL EVERY 6 HOURS PRN
Status: DISCONTINUED | OUTPATIENT
Start: 2021-10-05 | End: 2021-10-06 | Stop reason: HOSPADM

## 2021-10-05 RX ORDER — SODIUM CHLORIDE, SODIUM LACTATE, POTASSIUM CHLORIDE, CALCIUM CHLORIDE 600; 310; 30; 20 MG/100ML; MG/100ML; MG/100ML; MG/100ML
1000 INJECTION, SOLUTION INTRAVENOUS CONTINUOUS
Status: DISCONTINUED | OUTPATIENT
Start: 2021-10-05 | End: 2021-10-06

## 2021-10-05 RX ORDER — PANTOPRAZOLE SODIUM 40 MG/1
40 TABLET, DELAYED RELEASE ORAL
Status: DISCONTINUED | OUTPATIENT
Start: 2021-10-05 | End: 2021-10-06 | Stop reason: HOSPADM

## 2021-10-05 RX ORDER — LIDOCAINE 40 MG/G
CREAM TOPICAL
Status: DISCONTINUED | OUTPATIENT
Start: 2021-10-05 | End: 2021-10-06 | Stop reason: HOSPADM

## 2021-10-05 RX ORDER — ONDANSETRON 2 MG/ML
4 INJECTION INTRAMUSCULAR; INTRAVENOUS EVERY 6 HOURS PRN
Status: DISCONTINUED | OUTPATIENT
Start: 2021-10-05 | End: 2021-10-06 | Stop reason: HOSPADM

## 2021-10-05 RX ORDER — TRAZODONE HYDROCHLORIDE 50 MG/1
50-100 TABLET, FILM COATED ORAL
Status: DISCONTINUED | OUTPATIENT
Start: 2021-10-05 | End: 2021-10-06 | Stop reason: HOSPADM

## 2021-10-05 RX ORDER — PROCHLORPERAZINE 25 MG
25 SUPPOSITORY, RECTAL RECTAL EVERY 12 HOURS PRN
Status: DISCONTINUED | OUTPATIENT
Start: 2021-10-05 | End: 2021-10-06 | Stop reason: HOSPADM

## 2021-10-05 RX ORDER — DIAZEPAM 5 MG
5-20 TABLET ORAL EVERY 30 MIN PRN
Status: DISCONTINUED | OUTPATIENT
Start: 2021-10-05 | End: 2021-10-06 | Stop reason: HOSPADM

## 2021-10-05 RX ADMIN — SODIUM CHLORIDE, POTASSIUM CHLORIDE, SODIUM LACTATE AND CALCIUM CHLORIDE 1000 ML: 600; 310; 30; 20 INJECTION, SOLUTION INTRAVENOUS at 01:42

## 2021-10-05 RX ADMIN — ONDANSETRON 4 MG: 4 TABLET, ORALLY DISINTEGRATING ORAL at 18:50

## 2021-10-05 RX ADMIN — PROCHLORPERAZINE EDISYLATE 10 MG: 5 INJECTION INTRAMUSCULAR; INTRAVENOUS at 02:47

## 2021-10-05 RX ADMIN — DIAZEPAM 5 MG: 5 TABLET ORAL at 10:44

## 2021-10-05 RX ADMIN — ONDANSETRON 4 MG: 2 INJECTION INTRAMUSCULAR; INTRAVENOUS at 00:48

## 2021-10-05 RX ADMIN — DIAZEPAM 5 MG: 5 TABLET ORAL at 20:34

## 2021-10-05 RX ADMIN — DIAZEPAM 10 MG: 5 TABLET ORAL at 13:03

## 2021-10-05 RX ADMIN — THIAMINE HCL TAB 100 MG 100 MG: 100 TAB at 11:45

## 2021-10-05 RX ADMIN — HYDROXYZINE HYDROCHLORIDE 25 MG: 25 TABLET, FILM COATED ORAL at 18:50

## 2021-10-05 RX ADMIN — SODIUM CHLORIDE, POTASSIUM CHLORIDE, SODIUM LACTATE AND CALCIUM CHLORIDE 1000 ML: 600; 310; 30; 20 INJECTION, SOLUTION INTRAVENOUS at 08:44

## 2021-10-05 RX ADMIN — PANTOPRAZOLE SODIUM 40 MG: 40 TABLET, DELAYED RELEASE ORAL at 08:23

## 2021-10-05 RX ADMIN — FOLIC ACID 1 MG: 1 TABLET ORAL at 11:44

## 2021-10-05 RX ADMIN — DIAZEPAM 10 MG: 5 TABLET ORAL at 03:23

## 2021-10-05 RX ADMIN — DIAZEPAM 5 MG: 5 TABLET ORAL at 00:48

## 2021-10-05 RX ADMIN — MULTIPLE VITAMINS W/ MINERALS TAB 1 TABLET: TAB at 11:45

## 2021-10-05 RX ADMIN — SODIUM CHLORIDE, POTASSIUM CHLORIDE, SODIUM LACTATE AND CALCIUM CHLORIDE 1000 ML: 600; 310; 30; 20 INJECTION, SOLUTION INTRAVENOUS at 15:38

## 2021-10-05 RX ADMIN — DIAZEPAM 10 MG: 5 TABLET ORAL at 08:30

## 2021-10-05 RX ADMIN — DIAZEPAM 10 MG: 5 TABLET ORAL at 18:50

## 2021-10-05 RX ADMIN — ENOXAPARIN SODIUM 40 MG: 40 INJECTION SUBCUTANEOUS at 11:45

## 2021-10-05 RX ADMIN — DIAZEPAM 10 MG: 5 TABLET ORAL at 11:44

## 2021-10-05 RX ADMIN — PROCHLORPERAZINE EDISYLATE 10 MG: 5 INJECTION INTRAMUSCULAR; INTRAVENOUS at 20:35

## 2021-10-05 RX ADMIN — DIAZEPAM 10 MG: 5 TABLET ORAL at 15:38

## 2021-10-05 NOTE — PHARMACY-ADMISSION MEDICATION HISTORY
Admission Medication History Completed by Pharmacy    See [facility] Admission Navigator for allergy information, preferred outpatient pharmacy, prior to admission medications and immunization status.     Medication History Sources:     Patient    Surescript    Changes made to PTA medication list (reason):    Added: None    Deleted: None    Changed: None    Additional Information:    None    Prior to Admission medications    Medication Sig Last Dose Taking? Auth Provider   ferrous sulfate (FE TABS) 325 (65 Fe) MG EC tablet Take 1 tablet (325 mg) by mouth daily 10/3/2021 at Unknown time Yes Reyna Mcdaniels MD   folic acid (FOLVITE) 1 MG tablet Take 1 tablet (1 mg) by mouth daily 10/3/2021 at Unknown time Yes Reyna Mcdaniels MD   hydrOXYzine (ATARAX) 25 MG tablet Take 1-2 tablets (25-50 mg) by mouth 3 times daily as needed for anxiety Past Week at Unknown time Yes Mildred Mejias MD   multivitamin, therapeutic (THERA-VIT) TABS tablet Take 1 tablet by mouth daily Past Week at Unknown time Yes Reyna Mcdaniels MD   pantoprazole (PROTONIX) 40 MG EC tablet Take 1 tablet (40 mg) by mouth every morning (before breakfast) 10/3/2021 at Unknown time Yes Mildred Mejias MD   sertraline (ZOLOFT) 100 MG tablet Take 1 tablet (100 mg) by mouth daily 10/3/2021 at Unknown time Yes Mildred Mejias MD   thiamine (B-1) 100 MG tablet Take 1 tablet (100 mg) by mouth daily Past Week at Unknown time Yes Mildred Mejias MD   traZODone (DESYREL) 50 MG tablet Take 1-2 tablets ( mg) by mouth nightly as needed for sleep Past Week at Unknown time Yes Mildred Mejias MD       Date completed: 10/05/21    Medication history completed by: Meena Young [facility]

## 2021-10-05 NOTE — ED NOTES
"0200 - Pt complaining on mid lower chest pain, w/ mild SOB at this time. States \"something feels off\". Upon assessing pt he states that he does not feel it is his stomach, pt notes that he has been feeling anxious. Attained set of vitals. HR elevated 120's-130s. Dr Muñiz notified, order obtained for EKG. EKG performed and given to Dr. Muñiz. Will continue to monitor.   "

## 2021-10-05 NOTE — ED PROVIDER NOTES
ED Provider Note  [facility]      History     Chief Complaint   Patient presents with     Alcohol Problem     Detox from alcohol, drinking vodka daily, last drink prior to arrival.     The history is provided by the patient and medical records.   Alcohol Problem      Martín Shoemaker is a 30 year old male who has a history of depression anxiety and chemical dependency.  Presenting for alcohol intoxication.  They state that he relapsed on drinking approximately week ago has been taking a liter per day.  Has been unable to stop because of significant withdrawal symptoms resulting in return to alcohol use.  He arrived here afebrile but tachycardic with a breathalyzer of 0.317.  He is awake and ambulatory.  He has no known recent trauma or medical illnesses. He does report some mild low back discomfort. He does report few episodes of nausea today that was nonbloody.  He denies use of substances other than alcohol with the exception of medical marijuana.  No current suicidal ideation.     This part of the medical record was transcribed by Dania Blanchard Medical Scribe, from a dictation done by Tennille Muñiz MD.     Past Medical History  Past Medical History:   Diagnosis Date     Depressive disorder      Hypertension     pt reports he takes meds for HTN     Substance abuse (H)      Ulcer, gastric, acute      Past Surgical History:   Procedure Laterality Date     ADENOIDECTOMY       ENDOSCOPY       TONSILLECTOMY Bilateral      ferrous sulfate (FE TABS) 325 (65 Fe) MG EC tablet  folic acid (FOLVITE) 1 MG tablet  hydrOXYzine (ATARAX) 25 MG tablet  multivitamin, therapeutic (THERA-VIT) TABS tablet  pantoprazole (PROTONIX) 40 MG EC tablet  sertraline (ZOLOFT) 100 MG tablet  thiamine (B-1) 100 MG tablet  traZODone (DESYREL) 50 MG tablet      No Known Allergies  Family History  Family History   Problem Relation Age of Onset     Hypertension Mother      Cerebrovascular Disease Maternal Grandfather       Cerebrovascular Disease Paternal Grandfather      Social History   Social History     Tobacco Use     Smoking status: Light Tobacco Smoker     Smokeless tobacco: Current User     Types: Chew   Substance Use Topics     Alcohol use: Not Currently     Comment: binge, relapsed a few months ago     Drug use: Yes     Types: Marijuana     Comment: medical cannibus      Past medical history, past surgical history, medications, allergies, family history, and social history were reviewed with the patient. No additional pertinent items.       Review of Systems   Gastrointestinal: Positive for nausea and vomiting.   Musculoskeletal: Positive for back pain.   Neurological: Positive for tremors.   Psychiatric/Behavioral: Negative for suicidal ideas.   All other systems reviewed and are negative.    A complete review of systems was performed with pertinent positives and negatives noted in the HPI, and all other systems negative.    Physical Exam   BP: 131/81  Pulse: 117  Temp: 98.3  F (36.8  C)  Resp: 16  Weight: 81.6 kg (180 lb)  SpO2: 100 %     Physical Exam  Gen:A&Ox3, restless  HEENT:PERRL, no facial tenderness or wounds, head atraumatic, oropharynx clear, mucous membranes moist, TMs clear bilaterally  Neck:no bony tenderness or step offs, no JVD, trachea midline  Back: no CVA tenderness, no midline bony tenderness  CV: tachycardia without murmurs  PULM:Clear to auscultation bilaterally  Abd:soft, epigastric tenderness  UE:No traumatic injuries, skin normal  LE:no traumatic injuries, skin normal, no LE edema.   Neuro:CN II-XII intact, strength 5/5 throughout, mild tremulousness  Skin: no rashes or ecchymoses    ED Course      Procedures            EKG Interpretation:      Interpreted by Tennille Muñiz MD  Time reviewed: 2:09pm  Symptoms at time of EKG: chest pain and palpitations  Rhythm: sinus tachycardia  Rate: 103  Axis: normal  Ectopy: none  Conduction: normal, QTc 484 ms  ST Segments/ T Waves: No ST-T wave changes  Q  Waves: none  Comparison to prior: No old EKG available    Clinical Impression: sinus tachycardia       Results for orders placed or performed during the hospital encounter of 10/04/21   Comprehensive metabolic panel     Status: Abnormal   Result Value Ref Range    Sodium 135 133 - 144 mmol/L    Potassium 3.4 3.4 - 5.3 mmol/L    Chloride 94 94 - 109 mmol/L    Carbon Dioxide (CO2) 29 20 - 32 mmol/L    Anion Gap 12 3 - 14 mmol/L    Urea Nitrogen 12 7 - 30 mg/dL    Creatinine 0.76 0.66 - 1.25 mg/dL    Calcium 9.1 8.5 - 10.1 mg/dL    Glucose 144 (H) 70 - 99 mg/dL    Alkaline Phosphatase 116 40 - 150 U/L     (H) 0 - 45 U/L     (H) 0 - 70 U/L    Protein Total 8.5 6.8 - 8.8 g/dL    Albumin 4.7 3.4 - 5.0 g/dL    Bilirubin Total 1.0 0.2 - 1.3 mg/dL    GFR Estimate >90 >60 mL/min/1.73m2   Lipase     Status: Abnormal   Result Value Ref Range    Lipase 429 (H) 73 - 393 U/L   Asymptomatic COVID-19 Virus (Coronavirus) by PCR Oropharynx     Status: Normal    Specimen: Oropharynx; Swab   Result Value Ref Range    SARS CoV2 PCR Negative Negative    Narrative    Testing was performed using the [employer] Software[employer] Xpress SARS-CoV-2 Assay on the  Cepheid Gene-Xpert Instrument Systems. Additional information about  this Emergency Use Authorization (EUA) assay can be found via the Lab  Guide. This test should be ordered for the detection of SARS-CoV-2 in  individuals who meet SARS-CoV-2 clinical and/or epidemiological  criteria. Test performance is unknown in asymptomatic patients. This  test is for in vitro diagnostic use under the FDA EUA for  laboratories certified under CLIA to perform high complexity testing.  This test has not been FDA cleared or approved. A negative result  does not rule out the presence of PCR inhibitors in the specimen or  target RNA in concentration below the limit of detection for the  assay. The possibility of a false negative should be considered if  the patient's recent exposure or clinical presentation  suggests  COVID-19. This test was validated by the [facility]. This laboratory is certified under  the Clinical Laboratory Improvement Amendments of 1988 (CLIA-88) as  qualified to perform high complexity laboratory testing.     CBC with platelets and differential     Status: Abnormal   Result Value Ref Range    WBC Count 6.9 4.0 - 11.0 10e3/uL    RBC Count 5.69 4.40 - 5.90 10e6/uL    Hemoglobin 17.6 13.3 - 17.7 g/dL    Hematocrit 48.1 40.0 - 53.0 %    MCV 85 78 - 100 fL    MCH 30.9 26.5 - 33.0 pg    MCHC 36.6 (H) 31.5 - 36.5 g/dL    RDW 13.1 10.0 - 15.0 %    Platelet Count 239 150 - 450 10e3/uL    % Neutrophils 59 %    % Lymphocytes 33 %    % Monocytes 7 %    % Eosinophils 0 %    % Basophils 1 %    % Immature Granulocytes 0 %    NRBCs per 100 WBC 0 <1 /100    Absolute Neutrophils 4.1 1.6 - 8.3 10e3/uL    Absolute Lymphocytes 2.3 0.8 - 5.3 10e3/uL    Absolute Monocytes 0.5 0.0 - 1.3 10e3/uL    Absolute Eosinophils 0.0 0.0 - 0.7 10e3/uL    Absolute Basophils 0.1 0.0 - 0.2 10e3/uL    Absolute Immature Granulocytes 0.0 <=0.0 10e3/uL    Absolute NRBCs 0.0 10e3/uL   Alcohol breath test POCT     Status: Abnormal   Result Value Ref Range    Alcohol Breath Test 0.317 (A) 0.00 - 0.01   CBC with platelets differential     Status: Abnormal    Narrative    The following orders were created for panel order CBC with platelets differential.  Procedure                               Abnormality         Status                     ---------                               -----------         ------                     CBC with platelets and d...[[id number]]  Abnormal            Final result                 Please view results for these tests on the individual orders.     Medications   diazepam (VALIUM) tablet 5-20 mg (5 mg Oral Given 10/5/21 0048)   lactated ringers infusion (1,000 mLs Intravenous New Bag 10/5/21 0142)   ondansetron (ZOFRAN) injection 4 mg (4 mg Intravenous Given 10/4/21  2159)   0.9% sodium chloride BOLUS (0 mLs Intravenous Stopped 10/4/21 3636)   ondansetron (ZOFRAN) injection 4 mg (4 mg Intravenous Given 10/5/21 0048)        Assessments & Plan (with Medical Decision Making)   30-year-old male presenting intoxicated.  He has significant history of withdrawal including seizures and has been having a difficult time stopping drinking at home.  Alcohol level on arrival 0.317.  2 hours later he is already exhibiting withdrawal symptoms including restlessness and tremor.  IV access obtained laboratory testing done including CBC, comprehensive metabolic panel,  lipase, and urine drug screen.  He was treated with IV Zofran for nausea x2 and started on 0.9NS fluid bolus.  Labs are notable for new elevated LFTs and lipase.     This part of the medical record was transcribed by Dania Blanchard Medical Scribe, from a dictation done by Tennille Muñiz MD.     Pt was reassessed. Has had vomiting today, with ongoing nausea, epigastric pain, concerning for symptoms of pancreatitis. Started on IV LR infusion.   Discussed with IM triage hospitalist and admitted to the IM service.     2:05 AM  Pt reported chest pain and tachycardia worsened.   EKG done and shows no arrhythmia or conduction abnormality    I have reviewed the nursing notes. I have reviewed the findings, diagnosis, plan and need for follow up with the patient.    New Prescriptions    No medications on file       Final diagnoses:   Alcohol abuse   Alcohol-induced acute pancreatitis, unspecified complication status   Alcoholic hepatitis without ascites       --  Tennille Muñiz MD [facility] EMERGENCY DEPARTMENT  10/4/2021     Tennille Muñiz MD  10/05/21 8166

## 2021-10-05 NOTE — ED NOTES
Upon rounding to reassess pt, pt is laying in bed, eyes closed. Appears to be sleep, no signs of acute distress at this time. Pt on portable vitals machine. HR currently 91.

## 2021-10-05 NOTE — H&P
[facility]    History and Physical - Hospitalist Service       Date of Admission:  10/4/2021    Assessment & Plan      Martín Shoemaker is a 30 year old male admitted on 10/4/2021. He has a h/o alcohol use disorder, cocaine use, depression, reported PUD, an esophageal ulcer, alcohol withdrawal seizures, and multiple admissions for alcohol withdrawal.  He presents desiring sobriety, with abdominal/ back pain, and likely pancreatitis    Alcohol use disorder, relapsed  Acute alcohol intoxication  Alcohol withdrawal.  Patient has a history of numerous admissions for alcohol withdrawal and rehab.  Had been sober for.,  Says he relapsed last Saturday.  Visibly frustrated with himself and ashamed.  Desiring sobriety  -[facility] protocol with Valium (oral)  -Folate, thiamine      Abdominal pain  Elevated lipase  Probable pancreatitis  Alcoholic hepatitis  Patient presents after heavy alcohol binge with back pain and pain in his chest (points to epigastric region).  Lipase slightly elevated.  No hematemesis.  Of note does have a history of an esophageal ulcer last year and reported history of peptic ulcer disease as well.  Unclear if he is on his prescribed PPI (he cannot tell me).  Normal stools.  Hemoglobin has increased from 7 last year to 17 (with probable component of hemoconcentration).  Overall most consistent with pancreatitis.  Gastritis, esophagitis also on differential.  Does not have end-stage liver disease or history of varices  -IV fluids  -Pain control   n.p.o. for now  -prn anti-emetics  -protonix (home prescription)  Clonidine prn for SBP >170 and/or DBP >110    MDD  JOSE  -continue Zoloft       Diet:   NPO  DVT Prophylaxis: Enoxaparin (Lovenox) SQ  Bauman Catheter: Not present  Central Lines: None  Code Status:   full    Clinically Significant Risk Factors Present on Admission                   Disposition Plan   Expected discharge:  2-3 days recommended to prior living  "arrangement once adequate pain management/ tolerating PO medications, and alcohol withdrawal manageable without meds     The patient's care was discussed with the Patient.    Cole Coyne MD  [facility]  Securely message with the Vocera Web Console (learn more here)  Text page via [facility] Paging/Directory      ______________________________________________________________________    Chief Complaint   Alcohol withdrawal  Abdominal/ back pain    History is obtained from the patient, EMR, discussion with NICK jeffries    History of Present Illness   Martín Shoemaker is a 30 year old male who with a history of alcohol use disorder, cocaine use, cannabis use, reported gastric ulcer, esophageal ulcer, iron deficiency anemia who presents after recently relapsing from his sobriety and using alcohol very heavily for about the last week.  Tearful as he described how he \"fell off the wagon in parentheses.  Says family seemed very disappointed in him and quite embarrassed about this.  Have been drinking over a liter of vodka a day.  Afraid to taper down for fear of seizures, which she has had in the past, but not in the last several months.  Seen the patient was seen by neurology during a previous admission and further work-up of seizures had been recommended.  Unclear if this has been done.   Patient reports feeling poorly in general, \"super whole) and reports pain in his \"chest\".  He points to his epigastric region.  Previously reported back pain.  No trauma.  Denies hematemesis.  Stools have been normal.  Reports he has been eating hardly anything this last week and notes his urine output lately has minimal.  Notes that he has not any tremors but that this is typically later in his withdrawal process.  Would be interested in Ritu Schmidt.  On some admissions in the past seems to have declined this.  No fevers.  Sleep has been poor.   Reports has been \"passed out\", and not " sleeping.    Review of Systems    The 10 point Review of Systems is negative other than noted in the HPI or here.     Past Medical History    I have reviewed this patient's medical history and updated it with pertinent information if needed.   Past Medical History:   Diagnosis Date     Depressive disorder      Hypertension     pt reports he takes meds for HTN     Substance abuse (H)      Ulcer, gastric, acute        Past Surgical History   I have reviewed this patient's surgical history and updated it with pertinent information if needed.  Past Surgical History:   Procedure Laterality Date     ADENOIDECTOMY       ENDOSCOPY       TONSILLECTOMY Bilateral        Social History   I have reviewed this patient's social history and updated it with pertinent information if needed.  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   Called in sick last week  Family History   Psychiatric: denies  Chemical Dependency: denies  Suicide: Denies   Hereditary Major Medical: Hypertension, h/o seizures from alcohol withdrawal, GERD,          Family History   I have reviewed this patient's family history and updated it with pertinent information if needed.  Family History   Problem Relation Age of Onset     Hypertension Mother      Cerebrovascular Disease Maternal Grandfather      Cerebrovascular Disease Paternal Grandfather        Prior to Admission Medications   Prior to Admission Medications   Prescriptions Last Dose Informant Patient Reported? Taking?   ferrous sulfate (FE TABS) 325 (65 Fe) MG EC tablet Unknown at Unknown time  No Yes   Sig: Take 1 tablet (325 mg) by mouth daily   folic acid (FOLVITE) 1 MG tablet Unknown at Unknown time  No Yes    Sig: Take 1 tablet (1 mg) by mouth daily   hydrOXYzine (ATARAX) 25 MG tablet Unknown at Unknown time  No Yes   Sig: Take 1-2 tablets (25-50 mg) by mouth 3 times daily as needed for anxiety   multivitamin, therapeutic (THERA-VIT) TABS tablet Unknown at Unknown time  No Yes   Sig: Take 1 tablet by mouth daily   pantoprazole (PROTONIX) 40 MG EC tablet Unknown at Unknown time  No Yes   Sig: Take 1 tablet (40 mg) by mouth every morning (before breakfast)   sertraline (ZOLOFT) 100 MG tablet Unknown at Unknown time  No Yes   Sig: Take 1 tablet (100 mg) by mouth daily   thiamine (B-1) 100 MG tablet Unknown at Unknown time  No Yes   Sig: Take 1 tablet (100 mg) by mouth daily   traZODone (DESYREL) 50 MG tablet Unknown at Unknown time  No Yes   Sig: Take 1-2 tablets ( mg) by mouth nightly as needed for sleep      Facility-Administered Medications: None   Tells me he's not sure which of these he's taking currently      Allergies   No Known Allergies    Physical Exam   Vital Signs: Temp: 98.3  F (36.8  C) Temp src: Oral BP: (!) 142/96 Pulse: 101   Resp: 16 SpO2: 91 % O2 Device: None (Room air)    Weight: 180 lbs 0 oz    Gen:  Man lying in bed, slightly disheveled  Head: atraumatic  Eyes: white sclera, pupils equal  Throat: trachea midline  CV: Slightly tachycardia, reg rhyth, no r/g/m  Pulm: nl WOB, CTAB  Abd: soft,TTP in epigastric region  Skin: no rashes appreciated  PV: no peripheral edema  Neuro: alert, interactive.  No gross abnormalities  Psych: anxious, tearful        Data   Data reviewed today: I reviewed all medications, new labs and imaging results over the last 24 hours. I personally reviewed the EKG tracing showing NSR, slightly long QTc (480 ms).    Recent Labs   Lab 10/04/21  2156   WBC 6.9   HGB 17.6   MCV 85         POTASSIUM 3.4   CHLORIDE 94   CO2 29   BUN 12   CR 0.76   ANIONGAP 12   EMILEE 9.1   *   ALBUMIN 4.7   PROTTOTAL 8.5   BILITOTAL 1.0   ALKPHOS 116   *   *    LIPASE 429*     No results found for this or any previous visit (from the past 24 hour(s)).

## 2021-10-05 NOTE — PLAN OF CARE
Patient A&O x4 and able to make his needs known. Admitted from the  ED. LS clear and BS active. NPO except meds and Ice chips. Denied CP, lightheadedness, dizziness, numbness or tingling. Denied SOB/KOHLER, denied N&V earlier. PIV fluids infusing and voiding spontaneously without difficulties. MSSA scores 18,17,12,10,10,18 and Valium given according to the protocol. Zofran given for nausea. Demonstrates the ability to use call light appropriately and SBA. will continue with POC.

## 2021-10-06 VITALS
DIASTOLIC BLOOD PRESSURE: 74 MMHG | TEMPERATURE: 97.1 F | OXYGEN SATURATION: 96 % | BODY MASS INDEX: 25.1 KG/M2 | RESPIRATION RATE: 18 BRPM | SYSTOLIC BLOOD PRESSURE: 122 MMHG | HEART RATE: 79 BPM | WEIGHT: 180 LBS

## 2021-10-06 LAB
ALBUMIN SERPL-MCNC: 3.9 G/DL (ref 3.4–5)
ALP SERPL-CCNC: 97 U/L (ref 40–150)
ALT SERPL W P-5'-P-CCNC: 118 U/L (ref 0–70)
ANION GAP SERPL CALCULATED.3IONS-SCNC: 10 MMOL/L (ref 3–14)
AST SERPL W P-5'-P-CCNC: 134 U/L (ref 0–45)
BILIRUB SERPL-MCNC: 1.4 MG/DL (ref 0.2–1.3)
BUN SERPL-MCNC: 7 MG/DL (ref 7–30)
CALCIUM SERPL-MCNC: 9 MG/DL (ref 8.5–10.1)
CHLORIDE BLD-SCNC: 97 MMOL/L (ref 94–109)
CO2 SERPL-SCNC: 29 MMOL/L (ref 20–32)
CREAT SERPL-MCNC: 0.74 MG/DL (ref 0.66–1.25)
ERYTHROCYTE [DISTWIDTH] IN BLOOD BY AUTOMATED COUNT: 12.7 % (ref 10–15)
GFR SERPL CREATININE-BSD FRML MDRD: >90 ML/MIN/1.73M2
GLUCOSE BLD-MCNC: 72 MG/DL (ref 70–99)
HCT VFR BLD AUTO: 40.3 % (ref 40–53)
HGB BLD-MCNC: 14.5 G/DL (ref 13.3–17.7)
LIPASE SERPL-CCNC: 258 U/L (ref 73–393)
MAGNESIUM SERPL-MCNC: 1.5 MG/DL (ref 1.6–2.3)
MCH RBC QN AUTO: 30.9 PG (ref 26.5–33)
MCHC RBC AUTO-ENTMCNC: 36 G/DL (ref 31.5–36.5)
MCV RBC AUTO: 86 FL (ref 78–100)
PHOSPHATE SERPL-MCNC: 2.9 MG/DL (ref 2.5–4.5)
PLATELET # BLD AUTO: 110 10E3/UL (ref 150–450)
POTASSIUM BLD-SCNC: 3.6 MMOL/L (ref 3.4–5.3)
PROT SERPL-MCNC: 6.9 G/DL (ref 6.8–8.8)
RBC # BLD AUTO: 4.69 10E6/UL (ref 4.4–5.9)
SODIUM SERPL-SCNC: 136 MMOL/L (ref 133–144)
WBC # BLD AUTO: 5 10E3/UL (ref 4–11)

## 2021-10-06 PROCEDURE — 36415 COLL VENOUS BLD VENIPUNCTURE: CPT | Performed by: INTERNAL MEDICINE

## 2021-10-06 PROCEDURE — 82040 ASSAY OF SERUM ALBUMIN: CPT | Performed by: INTERNAL MEDICINE

## 2021-10-06 PROCEDURE — H0001 ALCOHOL AND/OR DRUG ASSESS: HCPCS

## 2021-10-06 PROCEDURE — G0378 HOSPITAL OBSERVATION PER HR: HCPCS

## 2021-10-06 PROCEDURE — 258N000003 HC RX IP 258 OP 636: Performed by: EMERGENCY MEDICINE

## 2021-10-06 PROCEDURE — 85027 COMPLETE CBC AUTOMATED: CPT | Performed by: INTERNAL MEDICINE

## 2021-10-06 PROCEDURE — 250N000013 HC RX MED GY IP 250 OP 250 PS 637: Performed by: PEDIATRICS

## 2021-10-06 PROCEDURE — 83690 ASSAY OF LIPASE: CPT | Performed by: INTERNAL MEDICINE

## 2021-10-06 PROCEDURE — 83735 ASSAY OF MAGNESIUM: CPT | Performed by: INTERNAL MEDICINE

## 2021-10-06 PROCEDURE — 99217 PR OBSERVATION CARE DISCHARGE: CPT | Performed by: INTERNAL MEDICINE

## 2021-10-06 PROCEDURE — 99207 PR CDG-CODE CATEGORY CHANGED: CPT | Performed by: INTERNAL MEDICINE

## 2021-10-06 PROCEDURE — 84100 ASSAY OF PHOSPHORUS: CPT | Performed by: INTERNAL MEDICINE

## 2021-10-06 RX ADMIN — THIAMINE HCL TAB 100 MG 100 MG: 100 TAB at 09:37

## 2021-10-06 RX ADMIN — PANTOPRAZOLE SODIUM 40 MG: 40 TABLET, DELAYED RELEASE ORAL at 09:37

## 2021-10-06 RX ADMIN — DIAZEPAM 5 MG: 5 TABLET ORAL at 09:37

## 2021-10-06 RX ADMIN — MULTIPLE VITAMINS W/ MINERALS TAB 1 TABLET: TAB at 09:36

## 2021-10-06 RX ADMIN — HYDROXYZINE HYDROCHLORIDE 25 MG: 25 TABLET, FILM COATED ORAL at 02:33

## 2021-10-06 RX ADMIN — FOLIC ACID 1 MG: 1 TABLET ORAL at 09:35

## 2021-10-06 RX ADMIN — SODIUM CHLORIDE, POTASSIUM CHLORIDE, SODIUM LACTATE AND CALCIUM CHLORIDE 1000 ML: 600; 310; 30; 20 INJECTION, SOLUTION INTRAVENOUS at 00:01

## 2021-10-06 RX ADMIN — DIAZEPAM 5 MG: 5 TABLET ORAL at 00:05

## 2021-10-06 RX ADMIN — TRAZODONE HYDROCHLORIDE 100 MG: 50 TABLET ORAL at 02:32

## 2021-10-06 NOTE — PROGRESS NOTES
Type Of Assessment: Comprehensive Assessment Update    Referral Source:  [address] [facility]     MRN: [id number]    DATE OF SERVICE: 2021  Date of previous CINTHIA Assessment: 21 at [facility] detox.  Patient confirmed identity through two factor verification: Full Legal Name and     PATIENT'S NAME: Martín Shoemaker  Age: 30 year old  Last 4 SSN: [id number]  Sex: male   Gender Identity: male  Sexual Orientation: Heterosexual  Cultural Background: No, Denies any cultural influences or concerns that need to be considered for treatment  YOB: 1990  Current Address:   [address] [place] [address] [address] [place] [address]  Patient Phone Number:  [phone]  Or  Earnestine Marcum: [phone]  Patient's E-Mail Contact:  [email or web address]  Funding: [facility]  PMI: [id number]  Emergency Contact: Seth Shoemaker (Father) [phone] (Mobile)  DAANES information was provided to patient and patient does not want a copy.     Telemedicine Visit: The patient's condition can be safely assessed and treated via synchronous audio and visual telemedicine encounter.    Reason for Telemedicine Visit: Patient required immediate assessment / treatment   Originating Site (Patient Location): [id number] [place] [address] Mental Health & Addiction Services  Distant Site (Provider Location): Provider Remote Setting- Home Office  Consent:  The patient/guardian has verbally consented to: the potential risks and benefits of telemedicine (video visit) versus in person care; bill my insurance or make self-payment for services provided; and responsibility for payment of non-covered services.   Mode of Communication:  Video Conference via Telephone    START TIME: 9:10 AM  END TIME: 10:02 AM    As the provider I attest to compliance with applicable laws and regulations related to telemedicine.   Martín Shoemaker was seen for a substance use disorder  "consult on 10/6/2021 by ANTONELLA Bhakta.    Reason for Substance Use Disorder Consult:  \"I don't know, I was told that I need to get an updated assessment. I was just in [facility] in July 2021, I don't think that I need to do the same thing again.  Per ED notes:  \"Martín Shoemaker is a 30 year old male who has a history of depression anxiety and chemical dependency.  Presenting for alcohol intoxication.  Patient relapsed on drinking approximately week ago has been taking a liter per day.  Has been unable to stop because of significant withdrawal symptoms resulting in return to alcohol use.  He arrived here afebrile but tachycardic with a breathalyzer of 0.317.  He is awake and ambulatory.  He has no known recent trauma or medical illnesses. He does report some mild low back discomfort. He does report few episodes of nausea that was nonbloody.  He denies use of substances other than alcohol with the exception of medical marijuana.  No current suicidal ideation.\"    Are you currently having severe withdrawal symptoms that are putting yourself or others in danger? No  Are you currently having severe medical problems that require immediate attention? No  Are you currently having severe emotional or behavioral problems that are putting yourself or others at risk of harm? No    Have you participated in prior substance use disorder evaluations? Yes. When, Where, and What circumstances: 7/1/21 at [address] detox.   Have you ever been to detox, inpatient or outpatient treatment for substance related use? List previous treatment: Yes. When, Where, and What circumstances: \"Patient reports that he has been in detox at [facility] 3-4 times. Patient reports the following dates and locations of treatment services:  [facility], [facility], [facility] and recently [facility] in July 2021.     Have you ever had a gambling problem or had treatment for compulsive gambling? No  Patient does not appear to be in severe " "withdrawal, an imminent safety risk to self or others, or requiring immediate medical attention and may proceed with the assessment interview.    Comprehensive Substance Use History   X X = Primary Drug Used Age of First Use    Pattern of Substance Use   (heaviest use in life and a use history within the past year if applicable) (DSM-5: Sx #3) Date /  Quantity of last use if within the past 30 days Withdrawal Potential?   Method of use  (Oral, smoked, snorted, IV, etc)    Alcohol   13 Age 13: First Use  Age 28: problem drinking began, weekends with friends  Age 30: daily, 1 pint/day.  Graduated [facility] in the beginning of August 2021, relapsed on alcohol 4-5 days in two occasions drinking .750ml daily as soon as he got out of treatment, stay sober for a few days, and then recently. Binge, relapsed a few months ago. Detox from alcohol, drinking vodka daily, last drink prior to arrival. 10/3/21 no oral    Marijuana/Hashish   13 Age 13: First Use  Current: Prescribed medically, 1-2 puffs as needed 6/22/21 no smoked    Cocaine/Crack No use        Meth/Amphetamines   No use        Heroin   No use        Other Opiates/Synthetics   No use        Inhalants  No use        Benzodiazepines   No use        Hallucinogens   No use        Barbiturates/Sedatives/Hypnotics   No use        Over-the-Counter Drugs   No use        Other   No use        Nicotine   15 Current: 1 pack/week        Withdrawal symptoms: Have you had any of the following withdrawal symptoms?  Sweating (Rapid Pulse)  Shaky / Jittery / Tremors  Nausea / Vomiting    Have you experienced any cravings?  No    Have you had periods of abstinence?  Yes   What was your longest period? \"Patient reports probably 9 months.\"    Any circumstances that lead to relapse? \"I don't know, just depression and my life situations.\"    What activities have you engaged in when using alcohol/other drugs that could be hazardous to you or others?  The patient reported having a history of " "driving while under the influence of alcohol or drugs.    A description of any risk-taking behavior, including behavior that puts the client at risk of exposure to blood-borne or sexually transmitted diseases: \"Patient reports none.\"    Arrests and legal interventions related to substance use: Patient reports the following substance related arrests or legal issues: DUI 2.5 years prior.  Patient does report being on probation / parole / under the jurisdiction of the court: [facility].    A description of how the patient's use affected their ability to function appropriately in a work setting: \"I got a DUI and lost my job 3 years years ago.\"    A description of how the patient's use affected their ability to function appropriately in an educational setting: Patient reports none.    Leisure time activities that are associated with substance use: \"Patient reports fishing\"    Do you think your substance use has become a problem for you? He agrees he has a substance abuse problem.    MEDICAL HISTORY  Physical or medical concerns or diagnoses:   Past Medical History:   Diagnosis Date     Depressive disorder       Hypertension       pt reports he takes meds for HTN     Substance abuse (H)       Ulcer, gastric, acute          Do you have any current medical treatment needs not being addressed by inpatient treatment?  No    Do you need a referral for a medical provider? No    Current medications:   Patient reports current meds as:   Outpatient Medications Marked as Taking for the 10/4/21 encounter (Hospital Encounter)   Medication Sig     ferrous sulfate (FE TABS) 325 (65 Fe) MG EC tablet Take 1 tablet (325 mg) by mouth daily     folic acid (FOLVITE) 1 MG tablet Take 1 tablet (1 mg) by mouth daily     hydrOXYzine (ATARAX) 25 MG tablet Take 1-2 tablets (25-50 mg) by mouth 3 times daily as needed for anxiety     multivitamin, therapeutic (THERA-VIT) TABS tablet Take 1 tablet by mouth daily     pantoprazole (PROTONIX) 40 " "MG EC tablet Take 1 tablet (40 mg) by mouth every morning (before breakfast)     sertraline (ZOLOFT) 100 MG tablet Take 1 tablet (100 mg) by mouth daily     thiamine (B-1) 100 MG tablet Take 1 tablet (100 mg) by mouth daily     traZODone (DESYREL) 50 MG tablet Take 1-2 tablets ( mg) by mouth nightly as needed for sleep         Are you pregnant? NA, Male    Do you have any specific physical needs/accommodations? No    MENTAL HEALTH HISTORY:  Have you ever had  hospitalizations or treatment for mental health illness: Yes. When, Where, and What circumstances:  Patient has received mental health services in the past: [facility] for therapy.  Psychiatric Hospitalizations: [facility] in April 2021.  Patient denies a history of civil commitment.  Current mental health services/providers include: no current providers.    Mental health history, including diagnosis and symptoms, and the effect on the client's ability to function: Patient reported the following previous mental health diagnoses: depression, anxiety.  Patient reports their primary mental health symptoms include: \"I can't sleep, I pace around, and when I'm depressed, I drink to feel better and put myself to sleep\" and these do impact his ability to function.    Current mental health treatment including psychotropic medication needed to maintain stability: (Note: The assessment must utilize screening tools approved by the commissioner pursuant to section 245.4863 to identify whether the client screens positive for co-occurring disorders): See medications listed above.    GAIN-SS Tool:  When was the last time that you had significant problems... 10/6/2021   with feeling very trapped, lonely, sad, blue, depressed or hopeless about the future? Never   with sleep trouble, such as bad dreams, sleeping restlessly, or falling asleep during the day? Past Month   with feeling very anxious, nervous, tense, scared, panicked or like something bad was " "going to happen? Past month   with becoming very distressed & upset when something reminded you of the past? Past month   with thinking about ending your life or committing suicide? Never     When was the last time that you did the following things 2 or more times? 10/6/2021   Lied or conned to get things you wanted or to avoid having to do something? Never   Had a hard time paying attention at school, work or home? 1+ years ago   Had a hard time listening to instructions at school, work or home? Never   Were a bully or threatened other people? Never   Started physical fights with other people? Never       Have you ever been verbally, emotionally, physically or sexually abused?   No    Family history of substance use and misuse: Patient reports none.    The patient's desire for family involvement in the treatment program: Patient reports none.  Level of family support: \"Everyone in my family is supportive\"    Social network in relation to expected support for recovery: Patient reports that he has attended AA in the past, used to go a lot of meetings but slow down since covid-19.\"    Are you currently in a significant relationship? No.    Do you have any children (include living arrangements/custody/contact)?: Patient reports having no children.     What is your current living situation?  Patient lives alone in his apartment and he reports that housing is stable.     Are you employed/attending school? Patient reports working second shift, full-time and employed is waiting for him.    SUMMARY:  Ability to understand written treatment materials: Yes  Ability to understand patient rules and patient rights: Yes  Does the patient recognize needs related to substance use and is willing to follow treatment recommendations: Yes  Does the patient have an opioid use disorder:  does not have a history of opiate use.    ASAM Dimension Scale Ratings:  Dimension 1: 0 Client displays full functioning with good ability to tolerate " and cope with withdrawal discomfort. No signs or symptoms of intoxication or withdrawal or resolving signs or symptoms.  Dimension 2: 0 Client displays full functioning with good ability to cope with physical discomfort.  Dimension 3: 2 Client has difficulty with impulse control and lacks coping skills. Client has thoughts of suicide or harm to others without means; however, the thoughts may interfere with participation in some treatment activities. Client has difficulty functioning in significant life areas. Client has moderate symptoms of emotional, behavioral, or cognitive problems. Client is able to participate in most treatment activities.  Dimension 4: 1 Client is motivated with active reinforcement, to explore treatment and strategies for change, but ambivalent about illness or need for change.  Dimension 5: 3 Client has poor recognition and understanding of relapse and recidivism issues and displays moderately high vulnerability for further substance use or mental health problems. Client has few coping skills and rarely applies coping skills.  Dimension 6: 3 Client is not engaged in structured, meaningful activity and the client's peers, family, significant other, and living environment are unsupportive, or there is significant criminal justice system involvement.    Category of Substance Severity (ICD-10 Code / DSM 5 Code)     Alcohol Use Disorder Severe  (10.20) (303.90)   Cannabis Use Disorder Moderate  (F12.20) (304.30)   Hallucinogen Use Disorder The patient does not meet the criteria for a Hallucinogen use disorder.   Inhalant Use Disorder The patient does not meet the criteria for an Inhalant use disorder.   Opioid Use Disorder The patient does not meet the criteria for an Opioid use disorder.   Sedative, Hypnotic, or Anxiolytic Use Disorder The patient does not meet the criteria for a Sedative/Hypnotic use disorder.   Stimulant Related Disorder The patient does not meet the criteria for a Stimulant  use disorder.   Tobacco Use Disorder Mild    (Z72.0) (305.1)   Other (or unknown) Substance Use Disorder The patient does not meet the criteria for a Other (or unknown) Substance use disorder.     A problematic pattern of alcohol/drug use leading to clinically significant impairment or distress, as manifested by at least two of the following, occurring within a 12-month period:    1.) Alcohol/drug is often taken in larger amounts or over a longer period than was intended.  2.) There is a persistent desire or unsuccessful efforts to cut down or control alcohol/drug use  3.) A great deal of time is spent in activities necessary to obtain alcohol, use alcohol, or recover from its effects.  4.) Craving, or a strong desire or urge to use alcohol/drug  5.) Recurrent alcohol/drug use resulting in a failure to fulfill major role obligations at work, school or home.  6.) Continued alcohol use despite having persistent or recurrent social or interpersonal problems caused or exacerbated by the effects of alcohol/drug.  7.) Important social, occupational, or recreational activities are given up or reduced because of alcohol/drug use.  8.) Recurrent alcohol/drug use in situations in which it is physically hazardous.  9.) Alcohol/drug use is continued despite knowledge of having a persistent or recurrent physical or psychological problem that is likely to have been caused or exacerbated by alcohol.  10.) Tolerance, as defined by either of the following: A need for markedly increased amounts of alcohol/drug to achieve intoxication or desired effect.  11.) Withdrawal, as manifested by either of the following: The characteristic withdrawal syndrome for alcohol/drug (refer to Criteria A and B of the criteria set for alcohol/drug withdrawal).    Specify if: In early remission:  After full criteria for alcohol/drug use disorder were previously met, none of the criteria for alcohol/drug use disorder have been met for at least 3 months  but for less than 12 months (with the exception that Criterion A4,  Craving or a strong desire or urge to use alcohol/drug  may be met).     In sustained remission:   After full criteria for alcohol use disorder were previously met, non of the criteria for alcohol/drug use disorder have been met at any time during a period of 12 months or longer (with the exception that Criterion A4,  Craving or strong desire or urge to use alcohol/drug  may be met).     Specify if:   This additional specifier is used if the individual is in an environment where access to alcohol is restricted.    Mild: Presence of 2-3 symptoms  Moderate: Presence of 4-5 symptoms  Severe: Presence of 6 or more symptoms    Collateral information: CINTHIA Collateral Info: Sufficient information is obtained from the patient to support diagnosis and recommendations. Contact with a collateral sources is not required.    Recommendations:   1)  Complete IOP/OP at [facility].   2)  Abstain from all mood-altering chemicals unless prescribed by a licensed provider.   3)  Attend weekly 12-step support group meetings.     4)  Actively work with a male sponsor or  through [place] [employer] ([phone]).   5)  Follow all the recommendations of your treatment/medical providers.  6)  Patient may benefit from obtaining a full mental health evaluation.  7)  Patient could benefit from 1:1 psychotherapy due to history of mental health       Referrals:  [facility] (AM track 9:00 am-12:00 pm  [email or web address],or call [phone], or via FAX to [phone]    Daanes: Record has been saved! Assessment ID: [id number]    CINTHIA consult completed by: ANTONELLA Bhakta.  Phone Number: [phone]  E-mail Address: [email or web address] Addiction Services Evaluation Department  [address] [place] [address]     *Due to regulation of Title 42 of the Code  of Federal Regulations (CFR) Part 2: Confidentiality laws apply to this note and the information wherein.  Thus, this note cannot be copy and pasted into any other health care staff's note nor can it be included in general medical records sent to ANY outside agency without the patient's written consent.

## 2021-10-06 NOTE — DISCHARGE SUMMARY
[facility]  Hospitalist Discharge Summary      Date of Admission:  10/4/2021  Date of Discharge:  10/6/2021  Discharging Provider: Joe Fierro MD      Discharge Diagnoses   Alcohol dependence  Alcohol withdrawal  Depression     Follow-ups Needed After Discharge   Follow-up Appointments     Adult [facility] Follow-up and recommended labs and tests      1)  Complete IOP/OP at [facility].   2)  Abstain from all mood-altering chemicals unless prescribed by a   licensed provider.   3)  Attend weekly 12-step support group meetings.     4)  Actively work with a male sponsor or  through [place]   [employer] ([phone]).   5)  Follow all the recommendations of your treatment/medical providers.  6)  Patient may benefit from obtaining a full mental health evaluation.   Pursue this as outpatient through the outpatient alcohol program   7)  Patient could benefit from 1:1 psychotherapy due to history of mental   health       Appointments on [place] and/or [facility] (with [facility] or [facility]   provider or service). Call [phone] if you haven't heard regarding   these appointments within 7 days of discharge.             Unresulted Labs Ordered in the Past 30 Days of this Admission     Date and Time Order Name Status Description    10/6/2021 10:13 AM Phosphorus In process     10/6/2021 10:13 AM Magnesium In process     10/5/2021  9:47 AM Urine Drug Confirmation Panel In process           Discharge Disposition   Discharged to home  Condition at discharge: Stable      Hospital Course   Martín Shoemaker is a 30 year old male admitted on 10/4/2021. He has a h/o alcohol use disorder, cocaine use, depression, reported PUD, an esophageal ulcer, alcohol withdrawal seizures, and multiple admissions for alcohol withdrawal.  He presents desiring sobriety, with abdominal/ back pain, and likely pancreatitis     Alcohol use disorder,  relapsed  Acute alcohol intoxication  Alcohol withdrawal.  Patient has a history of numerous admissions for alcohol withdrawal and rehab.  Had been sober for.,  Says he relapsed last Saturday, but last drank on Sunday  Visibly frustrated with himself and ashamed.  Desiring sobriety  -MSSA protocol with Valium (oral). Appears to have clinically detoxed as of today ( 10/6  -Folate, thiamine and multivitamin supplements. Per patient, he has all these meds at home    - he was evaluated by CD team with the following recommendations:  1)  Complete IOP/OP at [facility].   2)  Abstain from all mood-altering chemicals unless prescribed by a licensed provider.   3)  Attend weekly 12-step support group meetings.     4)  Actively work with a male sponsor or  through [employer] ([phone]).   5)  Follow all the recommendations of your treatment/medical providers.  6)  Patient may benefit from obtaining a full mental health evaluation. This could be done as outpatient   7)  Patient could benefit from 1:1 psychotherapy due to history of mental health             Alcohol use disorder, relapsed  Acute alcohol intoxication  Alcohol withdrawal.  Patient has a history of numerous admissions for alcohol withdrawal and rehab.  Had been sober for.,  Says he relapsed last Saturday.  Visibly frustrated with himself and ashamed.  Desiring sobriety  -MSSA protocol with Valium (oral)  -Folate, thiamine        Abdominal pain  Elevated lipase  Probable pancreatitis  Alcoholic hepatitis  Patient presents after heavy alcohol binge with back pain and pain in his chest (points to epigastric region).  Lipase slightly elevated.  No hematemesis.  Of note does have a history of an esophageal ulcer last year and reported history of peptic ulcer disease as well.  Unclear if he is on his prescribed PPI (he cannot tell me).  Normal stools.  Hemoglobin has increased from 7 last year to 17 (with probable component of  hemoconcentration).  Overall most consistent with pancreatitis.  Gastritis, esophagitis also on differential.  Does not have end-stage liver disease or history of varices  -IV fluids  -Pain control   n.p.o. for now  -prn anti-emetics  -protonix (home prescription)  Clonidine prn for SBP >170 and/or DBP >110     MDD  JOSE  -continue Zoloft            Abdominal pain  Elevated lipase  Probable pancreatitis  Alcoholic hepatitis  Patient presents after heavy alcohol binge with back pain and pain in his chest (points to epigastric region).  Lipase slightly elevated.  No hematemesis.  Of note does have a history of an esophageal ulcer last year and reported history of peptic ulcer disease as well.  Unclear if he is on his prescribed PPI (he cannot tell me).  Normal stools.  Hemoglobin has increased from 7 last year to 17 (with probable component of hemoconcentration).  Overall most consistent with pancreatitis.  Gastritis, esophagitis also on differential.  Does not have end-stage liver disease or history of varices  Abdominal pain resolved with conservative management with IV fluids, pain control and bowel rest   Lipase at discharge was at 258  AST and ALT trended down to 134 and 118 respectively      MDD  JOSE  -continue Zoloft          I did discuss extensively with patient if I should speak to his father. Patient did not want me to talk to his father    Consultations This Hospital Stay   CHEMICAL DEPENDENCY IP CONSULT    Code Status   Full Code    Time Spent on this Encounter   I, Joe Fierro MD, personally saw the patient today and spent greater than 30 minutes discharging this patient.       Joe Fierro MD  [facility] [address]  [address] [place] [id number]  Phone: [phone]  Fax: [phone]  ______________________________________________________________________    Physical Exam   Vital Signs: Temp: 97.1  F (36.2  C) Temp src: Oral BP: 122/74 Pulse: 68   Resp: 16 SpO2: 95  % O2 Device: None (Room air)    Weight: 180 lbs 0 oz  General Appearance: Awake, alert and not in distress  Respiratory: Clear breath sounds bilaterally   Cardiovascular: Normal Heart sounds  GI: Soft, non tender. Normal bowel sounds   Skin: No bruising or bleeding  Other: Awake, alert and orientated X 3        Primary Care Physician   Physician No Ref-Primary    Discharge Orders      Reason for your hospital stay    Acute abdominal pain  Alcohol withdrawal     Activity    Your activity upon discharge: activity as tolerated     Adult [facility] Follow-up and recommended labs and tests    1)  Complete IOP/OP at [facility].   2)  Abstain from all mood-altering chemicals unless prescribed by a licensed provider.   3)  Attend weekly 12-step support group meetings.     4)  Actively work with a male sponsor or  through [employer] ([phone]).   5)  Follow all the recommendations of your treatment/medical providers.  6)  Patient may benefit from obtaining a full mental health evaluation. Pursue this as outpatient through the outpatient alcohol program   7)  Patient could benefit from 1:1 psychotherapy due to history of mental health       Appointments on [place] and/or [facility] (with [facility] or [facility] provider or service). Call [phone] if you haven't heard regarding these appointments within 7 days of discharge.     Diet    Follow this diet upon discharge: Orders Placed This Encounter      Advance Diet as Tolerated: Regular Diet Adult       Significant Results and Procedures   Results for orders placed or performed during the hospital encounter of 04/11/21   CT Head w/o Contrast    Narrative    EXAM: CT HEAD W/O CONTRAST, CT CERVICAL SPINE W/O CONTRAST  LOCATION: [facility]  DATE/TIME: 4/11/2021 11:52 PM    INDICATION: Head and neck injury  COMPARISON: None.  TECHNIQUE:   1) Routine CT Head without IV contrast. Multiplanar reformats. Dose reduction techniques  were used.  2) Routine CT Cervical Spine without IV contrast. Multiplanar reformats. Dose reduction techniques were used.    FINDINGS:   HEAD CT:   INTRACRANIAL CONTENTS: No intracranial hemorrhage, extraaxial collection, or mass effect.  No CT evidence of acute infarct. Normal parenchymal attenuation. Normal ventricles and sulci.     VISUALIZED ORBITS/SINUSES/MASTOIDS: No intraorbital abnormality. No paranasal sinus mucosal disease. No middle ear or mastoid effusion.    BONES/SOFT TISSUES: Small amount of soft tissue swelling overlying lateral aspect of right orbit.    CERVICAL SPINE CT:   VERTEBRA: Normal vertebral body heights. Straightening of cervical lordosis. Slight left cervical curve. No fracture or posttraumatic subluxation.     CANAL/FORAMINA: No canal or neural foraminal stenosis.    PARASPINAL: No extraspinal abnormality. Visualized lung fields are clear.      Impression    IMPRESSION:  HEAD CT:  1.  Normal head CT.    CERVICAL SPINE CT:  1.  No CT evidence for acute fracture or post traumatic subluxation.   Cervical spine CT w/o contrast    Narrative    EXAM: CT HEAD W/O CONTRAST, CT CERVICAL SPINE W/O CONTRAST  LOCATION: [facility]  DATE/TIME: 4/11/2021 11:52 PM    INDICATION: Head and neck injury  COMPARISON: None.  TECHNIQUE:   1) Routine CT Head without IV contrast. Multiplanar reformats. Dose reduction techniques were used.  2) Routine CT Cervical Spine without IV contrast. Multiplanar reformats. Dose reduction techniques were used.    FINDINGS:   HEAD CT:   INTRACRANIAL CONTENTS: No intracranial hemorrhage, extraaxial collection, or mass effect.  No CT evidence of acute infarct. Normal parenchymal attenuation. Normal ventricles and sulci.     VISUALIZED ORBITS/SINUSES/MASTOIDS: No intraorbital abnormality. No paranasal sinus mucosal disease. No middle ear or mastoid effusion.    BONES/SOFT TISSUES: Small amount of soft tissue swelling overlying lateral aspect of right  orbit.    CERVICAL SPINE CT:   VERTEBRA: Normal vertebral body heights. Straightening of cervical lordosis. Slight left cervical curve. No fracture or posttraumatic subluxation.     CANAL/FORAMINA: No canal or neural foraminal stenosis.    PARASPINAL: No extraspinal abnormality. Visualized lung fields are clear.      Impression    IMPRESSION:  HEAD CT:  1.  Normal head CT.    CERVICAL SPINE CT:  1.  No CT evidence for acute fracture or post traumatic subluxation.   XR Ankle Right G/E 3 Views    Narrative    Exam: XR ANKLE RT G/E 3 VW, 4/15/2021 1:17 PM    Indication: pain    Comparison: None    Findings:   3 nonweightbearing views of the right ankle.    The ankle syndesmosis are congruent. The talar dome is intact. No  fracture. Small calcaneal plantar spur. There is an os trigonum. No  significant soft tissue swelling.      Impression    Impression: No acute osseous abnormality.    I have personally reviewed the examination and initial interpretation  and I agree with the findings.    JAGRUTI IRVIN MD (Joe)       Discharge Medications   Current Discharge Medication List      CONTINUE these medications which have NOT CHANGED    Details   ferrous sulfate (FE TABS) 325 (65 Fe) MG EC tablet Take 1 tablet (325 mg) by mouth daily  Qty: 30 tablet, Refills: 0    Associated Diagnoses: Anemia, unspecified type      folic acid (FOLVITE) 1 MG tablet Take 1 tablet (1 mg) by mouth daily  Qty: 30 tablet, Refills: 0    Associated Diagnoses: Alcoholism (H)      hydrOXYzine (ATARAX) 25 MG tablet Take 1-2 tablets (25-50 mg) by mouth 3 times daily as needed for anxiety  Qty: 60 tablet, Refills: 0    Associated Diagnoses: Anxiety and depression      multivitamin, therapeutic (THERA-VIT) TABS tablet Take 1 tablet by mouth daily  Qty: 30 tablet, Refills: 0    Associated Diagnoses: Alcoholism (H)      pantoprazole (PROTONIX) 40 MG EC tablet Take 1 tablet (40 mg) by mouth every morning (before breakfast)  Qty: 30 tablet, Refills: 0     Associated Diagnoses: Alcohol dependence with uncomplicated intoxication (H)      sertraline (ZOLOFT) 100 MG tablet Take 1 tablet (100 mg) by mouth daily  Qty: 30 tablet, Refills: 0    Associated Diagnoses: Alcohol dependence with uncomplicated intoxication (H)      thiamine (B-1) 100 MG tablet Take 1 tablet (100 mg) by mouth daily  Qty: 30 tablet, Refills: 0    Associated Diagnoses: Alcoholism (H)      traZODone (DESYREL) 50 MG tablet Take 1-2 tablets ( mg) by mouth nightly as needed for sleep  Qty: 60 tablet, Refills: 0    Associated Diagnoses: Alcohol dependence with uncomplicated intoxication (H)           Allergies   No Known Allergies

## 2021-10-06 NOTE — PLAN OF CARE
Vitals:    10/05/21 2100 10/06/21 0851 10/06/21 1149   BP: (!) 154/103 122/74    Pulse: 107 68 79   Resp: 16 16 18   Temp: 97.2  F (36.2  C) 97.1  F (36.2  C)    SpO2: 99% 95% 96%        Medically stable.  Reports to physician ready to discharge to home, will go to his parents and participate in out patient treatment as discussed.  Denies pain.  No abd pain. Tolerated eating breakfast and lunch, no  nausea  or abdominal pain. Reviewed discharge instructions.

## 2021-10-06 NOTE — PLAN OF CARE
"Pt is stable. Withdrawing from alcohol. Denied any gastric/abdominal pain. Tolerating full liquid diet. Complaining of being very hungry and tired. Independent in room. Pt got valium 5mg X1 d/t pt being concern about not going home if he is taking valium. Pt was educated and asked if he would like treatment. Pt said \"I have to ask my dad\". No further complaints. Will continue with plan of care.  "

## 2021-10-06 NOTE — PROGRESS NOTES
Per RN: patient feeling better in terms of nausea, pain abdomen and hungry.   Will advance to full liquid as tolerated. Order updated.   mariama RN.     Cross Cover.     Oral Hernandez MD  [facility]  Contact information available via [facility] Paging/Directory

## 2021-10-07 ENCOUNTER — PATIENT OUTREACH (OUTPATIENT)
Dept: CARE COORDINATION | Facility: CLINIC | Age: 31
End: 2021-10-07

## 2021-10-07 DIAGNOSIS — Z71.89 OTHER SPECIFIED COUNSELING: ICD-10-CM

## 2021-10-07 LAB
CREATININE URINE MG/DL  (SYNCED VALUE): 162 MG/DL
NORDIAZEPAM UR QL CFM: PRESENT
TEMAZEPAM UR QL CFM: PRESENT

## 2021-10-07 NOTE — PROGRESS NOTES
Clinic Care Coordination Contact  [facility]/Voicemail       Clinical Data: Care Coordinator Outreach  Outreach attempted x 1.  Left message on patient's voicemail with call back information and requested return call.  Plan: Care Coordinator will try to reach patient again in 1-2 business days.    EMILY Robbins  [phone]  [facility]

## 2021-10-08 NOTE — PROGRESS NOTES
Clinic Care Coordination Contact  [facility]/Voicemail       Clinical Data: Care Coordinator Outreach    Outreach attempted x 2.  Left message on patient's voicemail with call back information and requested return call.    Plan:  Care Coordinator will do no further outreaches at this time.    Iveth Larson, [facility]  [phone]  [facility]

## 2021-10-26 DIAGNOSIS — F10.20 ALCOHOLISM (H): ICD-10-CM

## 2021-10-28 NOTE — TELEPHONE ENCOUNTER
folic acid (FOLVITE) 1 MG tablet    Take 1 tablet (1 mg) by mouth daily     Last Written Prescription Date:  7/1/21  Last Fill Quantity: 30,   # refills: 0  Last Office Visit : 5/6/21  Future Office visit:  none     Routing refill request to provider for review/approval because:  Last prescribed in patient UR 3AFH CD

## 2021-11-02 RX ORDER — FOLIC ACID 1 MG/1
1 TABLET ORAL DAILY
Qty: 30 TABLET | OUTPATIENT
Start: 2021-11-02

## 2022-01-01 NOTE — PROGRESS NOTES
Pt ate 25% of his dinner, drinking fluids.  His blood pressure improved after dose of clonidine--did not meet parameters to repeat the clonidine, but it remains elevated.  Pt continues to spend his time in bed.  Attempt made to discuss his depression and his cyclical thoughts.  Pt says that he lays down but he can't stop his thoughts.  Unwilling or able to re-direct thoughts with diversional activity.  Encouraged patient to talk about his thoughts, offered to listen.  Pt opted to return to bed.  Discussed the inclusion of zyprexa at HS to help slow his thoughts and help him rest.  Will continue MSSA evaluations.   Normal cranial shape; fontanelle(s) of normal shape, size and tension; scalp inspection and palpation free of abrasions, defects, masses, and swelling; hair pattern normal. Detailed exam

## 2023-08-30 ENCOUNTER — HOSPITAL ENCOUNTER (INPATIENT)
Facility: CLINIC | Age: 33
LOS: 1 days | Discharge: HOME | End: 2023-09-01
Attending: FAMILY MEDICINE | Admitting: PSYCHIATRY & NEUROLOGY
Payer: COMMERCIAL

## 2023-08-30 ENCOUNTER — TELEPHONE (OUTPATIENT)
Dept: BEHAVIORAL HEALTH | Facility: CLINIC | Age: 33
End: 2023-08-30

## 2023-08-30 DIAGNOSIS — F10.229 ALCOHOL DEPENDENCE WITH INTOXICATION WITH COMPLICATION (H): ICD-10-CM

## 2023-08-30 DIAGNOSIS — F19.20 CHEMICAL DEPENDENCY (H): Primary | ICD-10-CM

## 2023-08-30 DIAGNOSIS — F10.220 ALCOHOL DEPENDENCE WITH UNCOMPLICATED INTOXICATION (H): ICD-10-CM

## 2023-08-30 DIAGNOSIS — K21.00 GASTROESOPHAGEAL REFLUX DISEASE WITH ESOPHAGITIS, UNSPECIFIED WHETHER HEMORRHAGE: ICD-10-CM

## 2023-08-30 LAB
ALBUMIN SERPL BCG-MCNC: 4.8 G/DL (ref 3.5–5.2)
ALCOHOL BREATH TEST: 0.31 (ref 0–0.01)
ALP SERPL-CCNC: 79 U/L (ref 40–129)
ALT SERPL W P-5'-P-CCNC: 26 U/L (ref 0–70)
ANION GAP SERPL CALCULATED.3IONS-SCNC: 14 MMOL/L (ref 7–15)
AST SERPL W P-5'-P-CCNC: 38 U/L (ref 0–45)
BASOPHILS # BLD AUTO: 0.1 10E3/UL (ref 0–0.2)
BASOPHILS NFR BLD AUTO: 1 %
BILIRUB SERPL-MCNC: 0.3 MG/DL
BUN SERPL-MCNC: 8.4 MG/DL (ref 6–20)
CALCIUM SERPL-MCNC: 9 MG/DL (ref 8.6–10)
CHLORIDE SERPL-SCNC: 101 MMOL/L (ref 98–107)
CREAT SERPL-MCNC: 0.6 MG/DL (ref 0.67–1.17)
DEPRECATED HCO3 PLAS-SCNC: 29 MMOL/L (ref 22–29)
EOSINOPHIL # BLD AUTO: 0 10E3/UL (ref 0–0.7)
EOSINOPHIL NFR BLD AUTO: 1 %
ERYTHROCYTE [DISTWIDTH] IN BLOOD BY AUTOMATED COUNT: 13 % (ref 10–15)
ETHANOL SERPL-MCNC: 0.44 G/DL
GFR SERPL CREATININE-BSD FRML MDRD: >90 ML/MIN/1.73M2
GLUCOSE SERPL-MCNC: 123 MG/DL (ref 70–99)
HCT VFR BLD AUTO: 40 % (ref 40–53)
HGB BLD-MCNC: 14.6 G/DL (ref 13.3–17.7)
HOLD SPECIMEN: NORMAL
IMM GRANULOCYTES # BLD: 0.1 10E3/UL
IMM GRANULOCYTES NFR BLD: 1 %
LIPASE SERPL-CCNC: 43 U/L (ref 13–60)
LYMPHOCYTES # BLD AUTO: 2.6 10E3/UL (ref 0.8–5.3)
LYMPHOCYTES NFR BLD AUTO: 45 %
MCH RBC QN AUTO: 30.4 PG (ref 26.5–33)
MCHC RBC AUTO-ENTMCNC: 36.5 G/DL (ref 31.5–36.5)
MCV RBC AUTO: 83 FL (ref 78–100)
MONOCYTES # BLD AUTO: 0.3 10E3/UL (ref 0–1.3)
MONOCYTES NFR BLD AUTO: 5 %
NEUTROPHILS # BLD AUTO: 2.8 10E3/UL (ref 1.6–8.3)
NEUTROPHILS NFR BLD AUTO: 47 %
NRBC # BLD AUTO: 0 10E3/UL
NRBC BLD AUTO-RTO: 0 /100
PLATELET # BLD AUTO: 203 10E3/UL (ref 150–450)
POTASSIUM SERPL-SCNC: 3.7 MMOL/L (ref 3.4–5.3)
PROT SERPL-MCNC: 7.4 G/DL (ref 6.4–8.3)
RBC # BLD AUTO: 4.8 10E6/UL (ref 4.4–5.9)
SODIUM SERPL-SCNC: 144 MMOL/L (ref 136–145)
WBC # BLD AUTO: 5.8 10E3/UL (ref 4–11)

## 2023-08-30 PROCEDURE — 250N000011 HC RX IP 250 OP 636: Performed by: FAMILY MEDICINE

## 2023-08-30 PROCEDURE — 80053 COMPREHEN METABOLIC PANEL: CPT | Performed by: FAMILY MEDICINE

## 2023-08-30 PROCEDURE — 99285 EMERGENCY DEPT VISIT HI MDM: CPT | Mod: 25 | Performed by: FAMILY MEDICINE

## 2023-08-30 PROCEDURE — 83690 ASSAY OF LIPASE: CPT | Performed by: FAMILY MEDICINE

## 2023-08-30 PROCEDURE — 82077 ASSAY SPEC XCP UR&BREATH IA: CPT | Performed by: FAMILY MEDICINE

## 2023-08-30 PROCEDURE — HZ2ZZZZ DETOXIFICATION SERVICES FOR SUBSTANCE ABUSE TREATMENT: ICD-10-PCS | Performed by: PSYCHIATRY & NEUROLOGY

## 2023-08-30 PROCEDURE — 99285 EMERGENCY DEPT VISIT HI MDM: CPT | Performed by: FAMILY MEDICINE

## 2023-08-30 PROCEDURE — 96375 TX/PRO/DX INJ NEW DRUG ADDON: CPT | Performed by: FAMILY MEDICINE

## 2023-08-30 PROCEDURE — 85025 COMPLETE CBC W/AUTO DIFF WBC: CPT | Performed by: FAMILY MEDICINE

## 2023-08-30 PROCEDURE — 36415 COLL VENOUS BLD VENIPUNCTURE: CPT | Performed by: FAMILY MEDICINE

## 2023-08-30 PROCEDURE — 250N000009 HC RX 250: Performed by: FAMILY MEDICINE

## 2023-08-30 PROCEDURE — 96365 THER/PROPH/DIAG IV INF INIT: CPT | Performed by: FAMILY MEDICINE

## 2023-08-30 PROCEDURE — 82075 ASSAY OF BREATH ETHANOL: CPT | Performed by: FAMILY MEDICINE

## 2023-08-30 RX ORDER — ONDANSETRON 2 MG/ML
4 INJECTION INTRAMUSCULAR; INTRAVENOUS ONCE
Status: COMPLETED | OUTPATIENT
Start: 2023-08-30 | End: 2023-08-30

## 2023-08-30 RX ADMIN — FOLIC ACID: 5 INJECTION, SOLUTION INTRAMUSCULAR; INTRAVENOUS; SUBCUTANEOUS at 20:02

## 2023-08-30 RX ADMIN — ONDANSETRON 4 MG: 2 INJECTION INTRAMUSCULAR; INTRAVENOUS at 20:00

## 2023-08-30 ASSESSMENT — ACTIVITIES OF DAILY LIVING (ADL)
ADLS_ACUITY_SCORE: 37
ADLS_ACUITY_SCORE: 37

## 2023-08-31 PROBLEM — F10.229 ALCOHOL DEPENDENCE WITH INTOXICATION WITH COMPLICATION (H): Status: ACTIVE | Noted: 2023-08-31

## 2023-08-31 LAB
ALCOHOL BREATH TEST: 0.27 (ref 0–0.01)
AMPHETAMINES UR QL SCN: NORMAL
BARBITURATES UR QL SCN: NORMAL
BENZODIAZ UR QL SCN: NORMAL
BZE UR QL SCN: NORMAL
CANNABINOIDS UR QL SCN: NORMAL
OPIATES UR QL SCN: NORMAL

## 2023-08-31 PROCEDURE — 99254 IP/OBS CNSLTJ NEW/EST MOD 60: CPT | Performed by: PHYSICIAN ASSISTANT

## 2023-08-31 PROCEDURE — 250N000013 HC RX MED GY IP 250 OP 250 PS 637: Performed by: STUDENT IN AN ORGANIZED HEALTH CARE EDUCATION/TRAINING PROGRAM

## 2023-08-31 PROCEDURE — 250N000011 HC RX IP 250 OP 636: Performed by: PSYCHIATRY & NEUROLOGY

## 2023-08-31 PROCEDURE — 82075 ASSAY OF BREATH ETHANOL: CPT | Performed by: FAMILY MEDICINE

## 2023-08-31 PROCEDURE — 128N000004 HC R&B CD ADULT

## 2023-08-31 PROCEDURE — 80307 DRUG TEST PRSMV CHEM ANLYZR: CPT | Performed by: FAMILY MEDICINE

## 2023-08-31 PROCEDURE — 99223 1ST HOSP IP/OBS HIGH 75: CPT | Mod: AI | Performed by: PSYCHIATRY & NEUROLOGY

## 2023-08-31 PROCEDURE — 250N000013 HC RX MED GY IP 250 OP 250 PS 637: Performed by: FAMILY MEDICINE

## 2023-08-31 RX ORDER — ONDANSETRON 4 MG/1
4 TABLET, FILM COATED ORAL EVERY 6 HOURS PRN
Status: DISCONTINUED | OUTPATIENT
Start: 2023-08-31 | End: 2023-09-01 | Stop reason: HOSPADM

## 2023-08-31 RX ORDER — MULTIPLE VITAMINS W/ MINERALS TAB 9MG-400MCG
1 TAB ORAL DAILY
Status: DISCONTINUED | OUTPATIENT
Start: 2023-08-31 | End: 2023-09-01 | Stop reason: HOSPADM

## 2023-08-31 RX ORDER — DIAZEPAM 5 MG
5-20 TABLET ORAL EVERY 30 MIN PRN
Status: DISCONTINUED | OUTPATIENT
Start: 2023-08-31 | End: 2023-09-01 | Stop reason: HOSPADM

## 2023-08-31 RX ORDER — PANTOPRAZOLE SODIUM 40 MG/1
40 TABLET, DELAYED RELEASE ORAL
Status: DISCONTINUED | OUTPATIENT
Start: 2023-09-01 | End: 2023-09-01 | Stop reason: HOSPADM

## 2023-08-31 RX ORDER — AMOXICILLIN 250 MG
1 CAPSULE ORAL 2 TIMES DAILY PRN
Status: DISCONTINUED | OUTPATIENT
Start: 2023-08-31 | End: 2023-09-01 | Stop reason: HOSPADM

## 2023-08-31 RX ORDER — MAGNESIUM HYDROXIDE/ALUMINUM HYDROXICE/SIMETHICONE 120; 1200; 1200 MG/30ML; MG/30ML; MG/30ML
30 SUSPENSION ORAL EVERY 4 HOURS PRN
Status: DISCONTINUED | OUTPATIENT
Start: 2023-08-31 | End: 2023-09-01 | Stop reason: HOSPADM

## 2023-08-31 RX ORDER — ATENOLOL 50 MG/1
50 TABLET ORAL DAILY PRN
Status: DISCONTINUED | OUTPATIENT
Start: 2023-08-31 | End: 2023-09-01 | Stop reason: HOSPADM

## 2023-08-31 RX ORDER — HYDROXYZINE HYDROCHLORIDE 25 MG/1
25 TABLET, FILM COATED ORAL EVERY 4 HOURS PRN
Status: DISCONTINUED | OUTPATIENT
Start: 2023-08-31 | End: 2023-09-01 | Stop reason: HOSPADM

## 2023-08-31 RX ORDER — DIAZEPAM 5 MG
5-20 TABLET ORAL EVERY 30 MIN PRN
Status: DISCONTINUED | OUTPATIENT
Start: 2023-08-31 | End: 2023-08-31

## 2023-08-31 RX ORDER — ACETAMINOPHEN 325 MG/1
650 TABLET ORAL EVERY 4 HOURS PRN
Status: DISCONTINUED | OUTPATIENT
Start: 2023-08-31 | End: 2023-09-01 | Stop reason: HOSPADM

## 2023-08-31 RX ORDER — NICOTINE 21 MG/24HR
1 PATCH, TRANSDERMAL 24 HOURS TRANSDERMAL DAILY
Status: DISCONTINUED | OUTPATIENT
Start: 2023-08-31 | End: 2023-09-01 | Stop reason: HOSPADM

## 2023-08-31 RX ORDER — TRAZODONE HYDROCHLORIDE 50 MG/1
50 TABLET, FILM COATED ORAL
Status: DISCONTINUED | OUTPATIENT
Start: 2023-08-31 | End: 2023-09-01 | Stop reason: HOSPADM

## 2023-08-31 RX ADMIN — ONDANSETRON HYDROCHLORIDE 4 MG: 4 TABLET, FILM COATED ORAL at 09:23

## 2023-08-31 RX ADMIN — DIAZEPAM 10 MG: 5 TABLET ORAL at 08:41

## 2023-08-31 RX ADMIN — HYDROXYZINE HYDROCHLORIDE 25 MG: 25 TABLET, FILM COATED ORAL at 13:18

## 2023-08-31 RX ADMIN — ONDANSETRON HYDROCHLORIDE 4 MG: 4 TABLET, FILM COATED ORAL at 17:38

## 2023-08-31 RX ADMIN — TRAZODONE HYDROCHLORIDE 50 MG: 50 TABLET ORAL at 22:22

## 2023-08-31 RX ADMIN — HYDROXYZINE HYDROCHLORIDE 25 MG: 25 TABLET, FILM COATED ORAL at 17:38

## 2023-08-31 RX ADMIN — DIAZEPAM 10 MG: 5 TABLET ORAL at 01:32

## 2023-08-31 RX ADMIN — MULTIPLE VITAMINS W/ MINERALS TAB 1 TABLET: TAB at 08:41

## 2023-08-31 ASSESSMENT — ACTIVITIES OF DAILY LIVING (ADL)
DIFFICULTY_COMMUNICATING: NO
ADLS_ACUITY_SCORE: 40
HEARING_DIFFICULTY_OR_DEAF: NO
FALL_HISTORY_WITHIN_LAST_SIX_MONTHS: NO
TOILETING_ISSUES: NO
ADLS_ACUITY_SCORE: 40
ADLS_ACUITY_SCORE: 40
HYGIENE/GROOMING: INDEPENDENT
ADLS_ACUITY_SCORE: 40
WEAR_GLASSES_OR_BLIND: NO
ADLS_ACUITY_SCORE: 37
ORAL_HYGIENE: INDEPENDENT
DRESS: SCRUBS (BEHAVIORAL HEALTH)
CHANGE_IN_FUNCTIONAL_STATUS_SINCE_ONSET_OF_CURRENT_ILLNESS/INJURY: NO
DOING_ERRANDS_INDEPENDENTLY_DIFFICULTY: NO
ADLS_ACUITY_SCORE: 40
DIFFICULTY_EATING/SWALLOWING: NO
HYGIENE/GROOMING: INDEPENDENT
HYGIENE/GROOMING: INDEPENDENT
CONCENTRATING,_REMEMBERING_OR_MAKING_DECISIONS_DIFFICULTY: NO
LAUNDRY: UNABLE TO COMPLETE
ADLS_ACUITY_SCORE: 40
ORAL_HYGIENE: INDEPENDENT
ORAL_HYGIENE: INDEPENDENT
DRESSING/BATHING_DIFFICULTY: NO
ADLS_ACUITY_SCORE: 40
DRESS: INDEPENDENT
WALKING_OR_CLIMBING_STAIRS_DIFFICULTY: NO
ADLS_ACUITY_SCORE: 40

## 2023-08-31 ASSESSMENT — LIFESTYLE VARIABLES
AUDIT-C TOTAL SCORE: 8
SKIP TO QUESTIONS 9-10: 0

## 2023-08-31 NOTE — PLAN OF CARE
Problem: Plan of Care - These are the overarching goals to be used throughout the patient stay.    Goal: Plan of Care Review  Description: The Plan of Care Review/Shift note should be completed every shift.  The Outcome Evaluation is a brief statement about your assessment that the patient is improving, declining, or no change.  This information will be displayed automatically on your shift note.  Outcome: Progressing   Goal Outcome Evaluation:                      Patient alert and oriented on approach. He was encouraged twice to get up and get his breakfast and take his medications; at first he said no and that he felt tired and unwell. On second approach, he agreed to come out of the room, he came out and had at least half of his breakfast and took his medications.     MSSA scored at 8 for 0800 assessment and writer administered prn 10 mg of valium. Prn Zofran administered for nausea. MSSA at 1200 was 5 and no valium administered. Patient denied SI, HI, hallucinations, depression and contracted for safety.     At about 1310 patient came out of room, had snacks / lunch and asked who he can speak to about discharge. Writer was at the  and spoke with patient educating him on the detox discharge process. Patient appeared anxious and concerned about not being able to discharge today especially since he had valium less than 24 hours ago. Patient said he has not been drinking a lot of alcohol, just some beers.   Writer offered patient prn for anxiety, nicotine gum, hard candy and lavender patches. Patient chose to take the prn for anxiety-writer administered prn hydroxyzine 25 mg and patient had a conversation with [facility] about fishing and went back to his room and rested.    Patient transferred from room 329-2 to room 323-1.  No further complaints or behavioral issues noted. Patient vitals wnl /61   Pulse 87   Temp 98.9  F (37.2  C) (Oral)   Resp 16   Ht 1.829 m (6')   Wt 90.7 kg (200 lb)   SpO2  97%   BMI 27.12 kg/m

## 2023-08-31 NOTE — H&P
"  Martín Shoemaker is a 32 year old male    Chief compliant  alcohol    [place]    As per ED note    Alcohol Problem        Patient reports he drinks \"not that much\" in a day, patient thinks maybe a liter a day. Patient denies seizure history.       HPI  Martín Shoemaker is a 32 year old male who has a long history of alcohol addiction and is brought here by family member in hopes that he may go through detox on an inpatient basis.  Patient has been drinking a liter a day for several weeks if not months patient's family member is not able to even remember the last time that he was sober for a month patient did have treatment 2 years ago since that time she he is also attempted to detox himself and has had an alcohol withdrawal seizure he denies any history of DTs.  No other significant psychiatric or medical history is noted.     During my interview with the patient  Patient reports he was sober however he went on vacation and relapsed and has been drinking since his vacation 1 L of alcohol.  He was in [place] his family intervened and brought him back he was admitted with 0.44     Patient has been using the following substances: Alcohol  Started at age 13 or 58, became a problem at 4 years ago after a bad break-up.  He does have withdrawal seizures.  Prompted him to get help this time for his withdrawal  His withdrawal symptoms including nausea tiredness headaches shakiness     Patient has tolerance, withdrawal, progressive use, loss of control, spending more time and more amount than intended. Patient has made attempts to quit, is experiencing cravings, and reports negative consequences.                    Patient does have a history of seizures.  Patient does not have a history of delirium tremens.                    Denies thoughts of suicide or harming others.       Denies auditory or visual hallucinations.      Patient smokes 0 cigarettes he denies any illicit drug use        Patient denied any gambling      "   PSYCHIATRIC REVIEW OF SYSTEMS:          Psychiatric Review of Systems:   Depression:    Denied depressed mood, suicidal ideation, decreased interest, changes in sleep, changes in appetite, guilt, hopelessness, helplessness, impaired concentration, decreased energy, irritability.   Denies: depressed mood, suicidal ideation, decreased interest, changes in sleep, changes in appetite, guilt, hopelessness, helplessness, impaired concentration, decreased energy, irritability.  Janene:    Denied sleeplessness, impulsiveness, racing thoughts, increased goal-directed activities, pressured speech, increase in energy  Janene Feeling euphoric,Distractible,Impulsive,Grandiose,Talking excessively,Have energy without sleeping,Mood swings,Irritability  Denies: sleeplessness, increased goal-directed activities, abrupt increase in energy, pressured speech  Psychosis:     Denies: visual hallucinations, auditory hallucinations, paranoia  Anxiety:    Denied excessive worries that are difficult to control for the past 6 months,   Chronic anxiety , not able to stop worrying impacting sleep, poor conc, irritable , muscle tension     Anxiety  Pt has following s/o of anxiety  Feeling anxious all the time,Excessive worry,Not able to stop worrying,Impacting sleep,Concentration,Muscle tension,Irritability,Fatigue  Denied panic attacks       Denies: worries that are difficult to control for the past 6 months, panic attacks  PTSD:     Denies: re-experiencing past trauma, nightmares, increased arousal, avoidance of traumatic stimuli, impaired function.  OCD:     Denies: obsessions, checking, symmetry, cleaning, skin picking.  ED:     Denies: restriction, binging, purging.     Denied symptoms of attention deficit disorder include a failure to pay attention to detail, a pattern of careless mistakes, a pattern of inattentive listening, a failure to follow through with projects, poor personal organization, losing necessary objects, distractibility,  forgetfulness.     Denied symptoms of borderline personality disorder include a fear of abandonment, unstable self-image, impulsive behavior, dissociative feeling, intense anger, unstable personal relationships, chronic feelings of boredom, periods of intense depressed mood.                  PSYCHIATRIC HISTORY            Past court commitments: none  SIB /SUICIDE ATTEMPTS NONE  Psych Hosp : 5 or 6 times last hospitalization was for substance-induced paranoia he was using cocaine at that time     Inpatient cd trt 6 CD treatments vague about details  Out pt cd trt           SOCIAL HISTORY                                                                          [place] [employer] [place] he is single lives in [place]         Family History:   FAMILY HISTORY:   Family History         Family History   Problem Relation Age of Onset    Hypertension Mother      Cerebrovascular Disease Maternal Grandfather      Cerebrovascular Disease Paternal Grandfather           Family Mental Health History-  none     Substance Use Problems -none        Medical h/o   A 10-point review of systems is reviewed and is negative except for psychiatric symptoms above.       Allergies reviewed  Blood pressure (!) 131/94, pulse 89, temperature 97.9  F (36.6  C), temperature source Oral, resp. rate 18, height 1.829 m (6'), weight 90.7 kg (200 lb), SpO2 97 %.   vitals  Appearance:  awake, alert, appeared as age stated, adequate groomed and slightly unkempt  Attitude:  cooperative  Eye Contact:  good  Mood: EuthymicAffect:  congruent   Speech:  clear, coherent normal rate   Psychomotor Behavior:  no evidence of tardive dyskinesia, dystonia, or tics  Thought Process:  logical, linear and goal oriented  Associations:  no loose associations  Thought Content:  no evidence of psychotic thought and active suicidal ideation present  Denied any active suicidal /homicidation ideation plan intent   Insight:  fair  Judgment:   fair  Oriented to:  time, person, and place  Attention Span and Concentration:  intact  Recent and Remote Memory:  intact  Language:  english with appropriate syntax and vocabulary  Fund of Knowledge: appropriate  Muscle Strength and Tone: normal  Gait and Station: Normal          Patient has severe exacerbation of chronic alcoholism  ,  been unable to stop using  in the community due to both physical and psychological symptoms.  Continued use will put the patient at risk for medical and/or psychiatric complications.   Diagnosis alcohol use disorder severe alcohol withdrawal severe    Plan  Patient with detox of alcohol using [facility] protocol on Valium  Patient has elevated blood pressure 131/94 eating disturbance tremor and nausea he required 20 mg of diazepam since his admission  We will add Zofran for his nausea  Current Facility-Administered Medications   Medication    acetaminophen (TYLENOL) tablet 650 mg    alum & mag hydroxide-simethicone (MAALOX) suspension 30 mL    atenolol (TENORMIN) tablet 50 mg    diazepam (VALIUM) tablet 5-20 mg    hydrOXYzine (ATARAX) tablet 25 mg    multivitamin w/minerals (THERA-VIT-M) tablet 1 tablet    nicotine (NICODERM CQ) 14 MG/24HR 24 hr patch 1 patch    nicotine (NICORETTE) gum 2 mg    nicotine Patch in Place    ondansetron (ZOFRAN) tablet 4 mg    [START ON 9/1/2023] pantoprazole (PROTONIX) EC tablet 40 mg    senna-docusate (SENOKOT-S/PERICOLACE) 8.6-50 MG per tablet 1 tablet    traZODone (DESYREL) tablet 50 mg     Facility-Administered Medications Ordered in Other Encounters   Medication    Self Administer Medications: Behavioral Services        I HAVE REVIEWED LABS WITH PT AND TALKED ABOUT RESULTS WITH PT  I HAVE REVIEWED AND SUMMARIZED OLD RECORDS including his medication reconcilation of his home medications  and PDMP   I HAVE SPOKEN WITH RN ABOUT MEDICATIONS AND withdrawl SCORES  I HAVE SPOKEN WITH CM ABOUT PTS TREATMETN OPTIONS

## 2023-08-31 NOTE — PROGRESS NOTES
08/31/23 0307   Patient Belongings   Did you bring any home meds/supplements to the hospital?  No   Patient Belongings other (see comments)   Belongings Search Yes   Clothing Search Yes   Second Staff Saul and Fam     Storage Bin: Shirt, shoes, chew tobacco, hat.   Med-Room Bin: Wallet.   Security Envelope (#[id number]):  license, 4 master cards.   ADMISSION:    I am responsible for any personal items that are not sent to the safe or pharmacy. [facility] is not responsible for loss, theft or damage of any property in my possession.    Patient Signature _________________________________________ Date/Time _____________________    Staff Signature ___________________________________________ Date/Time _____________________    2nd Staff person, if patient is unable/unwilling to sign    ________________________________________________________ Date/Time _____________________    DISCHARGE:    All personal items have been returned to me.    Patient Signature _________________________________________ Date/Time _____________________    Staff Signature ___________________________________________ Date/Time _____________________

## 2023-08-31 NOTE — PLAN OF CARE
Problem: Adult Behavioral Health Plan of Care  Goal: Plan of Care Review  8/31/2023 0658 by Fam Em, RN  Outcome: Progressing  8/31/2023 0449 by Fam Em RN  Outcome: Progressing     Problem: Alcohol Withdrawal  Goal: Alcohol Withdrawal Symptom Control  8/31/2023 0658 by Fam Em RN  Outcome: Progressing  8/31/2023 0449 by Fam Em RN  Outcome: Progressing     Problem: Sleep Disturbance  Goal: Adequate Sleep/Rest  Outcome: Progressing   Goal Outcome Evaluation:        Pt continues to sleep comfortable since arrival on the unit. Pt is in seeking detox from alcohol. MSSA scores were 5 and 3 respectively. Will continue to monitor and treat as needed

## 2023-08-31 NOTE — PHARMACY-ADMISSION MEDICATION HISTORY
Pharmacist Admission Medication History    Admission medication history is complete. The information provided in this note is only as accurate as the sources available at the time of the update.    Medication reconciliation/reorder completed by provider prior to medication history? Yes    Information Source(s): Patient via in-person    Pertinent Information:   - Patient states he is not taking any Rx/OTC medications. He states he previously was on sertraline and trazodone but these did not work for him so he stopped taking them     Changes made to PTA medication list:  Added: None  Deleted:   Ferrous sulfate 325 mg daily   Folic acid 1 mg daily   Hydroxyzine 25 mg TID PRN   MVI daily   Pantoprazole 40 mg daily   Sertraline 100 mg daily   Thiamine 100 mg daily   Trazodone 50 mg HS PRN   Changed: None    Medication Affordability:       Allergies reviewed with patient and updates made in EHR: yes    Medication History Completed By: CHRISTIAN RAYO RPH 8/31/2023 2:09 PM    Prior to Admission medications    Not on File        Yes

## 2023-08-31 NOTE — ED PROVIDER NOTES
"ED Provider Note  [facility]      History     Chief Complaint   Patient presents with    Alcohol Problem     Patient reports he drinks \"not that much\" in a day, patient thinks maybe a liter a day. Patient denies seizure history.      HPI  Martín Shoemaker is a 32 year old male who has a long history of alcohol addiction and is brought here by family member in hopes that he may go through detox on an inpatient basis.  Patient has been drinking a liter a day for several weeks if not months patient's family member is not able to even remember the last time that he was sober for a month patient did have treatment 2 years ago since that time she he is also attempted to detox himself and has had an alcohol withdrawal seizure he denies any history of DTs.  No other significant psychiatric or medical history is noted.    Past Medical History  Past Medical History:   Diagnosis Date    Depressive disorder     Hypertension     pt reports he takes meds for HTN    Substance abuse (H)     Ulcer, gastric, acute      Past Surgical History:   Procedure Laterality Date    ADENOIDECTOMY      ENDOSCOPY      TONSILLECTOMY Bilateral      ferrous sulfate (FE TABS) 325 (65 Fe) MG EC tablet  folic acid (FOLVITE) 1 MG tablet  hydrOXYzine (ATARAX) 25 MG tablet  multivitamin, therapeutic (THERA-VIT) TABS tablet  pantoprazole (PROTONIX) 40 MG EC tablet  sertraline (ZOLOFT) 100 MG tablet  thiamine (B-1) 100 MG tablet  traZODone (DESYREL) 50 MG tablet      No Known Allergies  Family History  Family History   Problem Relation Age of Onset    Hypertension Mother     Cerebrovascular Disease Maternal Grandfather     Cerebrovascular Disease Paternal Grandfather      Social History   Social History     Tobacco Use    Smoking status: Light Smoker    Smokeless tobacco: Current     Types: Chew   Substance Use Topics    Alcohol use: Not Currently     Comment: binge, relapsed a few months ago    Drug use: Yes     Types: Marijuana "     Comment: medical cannibus         A medically appropriate review of systems was performed with pertinent positives and negatives noted in the HPI, and all other systems negative.    Physical Exam   BP: (!) 158/93  Pulse: 113  Temp: 97.9  F (36.6  C)  Resp: 18  Height: 182.9 cm (6')  Weight: 98 kg (216 lb)  SpO2: 95 %  Physical Exam  Constitutional:       General: He is not in acute distress.     Appearance: Normal appearance. He is not toxic-appearing.   HENT:      Head: Atraumatic.   Eyes:      General: No scleral icterus.     Conjunctiva/sclera: Conjunctivae normal.   Cardiovascular:      Rate and Rhythm: Normal rate.      Heart sounds: Normal heart sounds.   Pulmonary:      Effort: Pulmonary effort is normal. No respiratory distress.      Breath sounds: Normal breath sounds.   Abdominal:      Palpations: Abdomen is soft.      Tenderness: There is no abdominal tenderness.   Musculoskeletal:         General: No deformity.      Cervical back: Neck supple.   Skin:     General: Skin is warm.   Neurological:      General: No focal deficit present.      Mental Status: He is alert and oriented to person, place, and time.      Sensory: No sensory deficit.      Motor: No weakness.      Coordination: Coordination normal.   Psychiatric:         Speech: Speech is slurred.         Behavior: Behavior is cooperative.         Thought Content: Thought content does not include homicidal or suicidal ideation.           ED Course, Procedures, & Data      Procedures        Results for orders placed or performed during the hospital encounter of 08/30/23   Comprehensive metabolic panel     Status: Abnormal   Result Value Ref Range    Sodium 144 136 - 145 mmol/L    Potassium 3.7 3.4 - 5.3 mmol/L    Chloride 101 98 - 107 mmol/L    Carbon Dioxide (CO2) 29 22 - 29 mmol/L    Anion Gap 14 7 - 15 mmol/L    Urea Nitrogen 8.4 6.0 - 20.0 mg/dL    Creatinine 0.60 (L) 0.67 - 1.17 mg/dL    Calcium 9.0 8.6 - 10.0 mg/dL    Glucose 123 (H) 70 - 99  mg/dL    Alkaline Phosphatase 79 40 - 129 U/L    AST 38 0 - 45 U/L    ALT 26 0 - 70 U/L    Protein Total 7.4 6.4 - 8.3 g/dL    Albumin 4.8 3.5 - 5.2 g/dL    Bilirubin Total 0.3 <=1.2 mg/dL    GFR Estimate >90 >60 mL/min/1.73m2   Lipase     Status: Normal   Result Value Ref Range    Lipase 43 13 - 60 U/L   Ethyl Alcohol Level     Status: Abnormal   Result Value Ref Range    Alcohol ethyl 0.44 (HH) <=0.01 g/dL   [place] Draw     Status: None (In process)    Narrative    The following orders were created for panel order [place] Draw.  Procedure                               Abnormality         Status                     ---------                               -----------         ------                     Extra Red Top Tube[[id number]]                               In process                 Extra Green Top (Lithium...[[id number]]                                                 Extra Purple Top Tube[[id number]]                                                         Please view results for these tests on the individual orders.   CBC with platelets and differential     Status: None   Result Value Ref Range    WBC Count 5.8 4.0 - 11.0 10e3/uL    RBC Count 4.80 4.40 - 5.90 10e6/uL    Hemoglobin 14.6 13.3 - 17.7 g/dL    Hematocrit 40.0 40.0 - 53.0 %    MCV 83 78 - 100 fL    MCH 30.4 26.5 - 33.0 pg    MCHC 36.5 31.5 - 36.5 g/dL    RDW 13.0 10.0 - 15.0 %    Platelet Count 203 150 - 450 10e3/uL    % Neutrophils 47 %    % Lymphocytes 45 %    % Monocytes 5 %    % Eosinophils 1 %    % Basophils 1 %    % Immature Granulocytes 1 %    NRBCs per 100 WBC 0 <1 /100    Absolute Neutrophils 2.8 1.6 - 8.3 10e3/uL    Absolute Lymphocytes 2.6 0.8 - 5.3 10e3/uL    Absolute Monocytes 0.3 0.0 - 1.3 10e3/uL    Absolute Eosinophils 0.0 0.0 - 0.7 10e3/uL    Absolute Basophils 0.1 0.0 - 0.2 10e3/uL    Absolute Immature Granulocytes 0.1 <=0.4 10e3/uL    Absolute NRBCs 0.0 10e3/uL   CBC with platelets differential     Status: None    Narrative    The  following orders were created for panel order CBC with platelets differential.  Procedure                               Abnormality         Status                     ---------                               -----------         ------                     CBC with platelets and d...[[id number]]                      Final result                 Please view results for these tests on the individual orders.     Medications   dextrose 5% and 0.45% NaCl 1,000 mL with Infuvite Adult 10 mL, thiamine 100 mg, folic acid 1 mg infusion ( Intravenous $New Bag 8/30/23 2002)   ondansetron (ZOFRAN) injection 4 mg (4 mg Intravenous $Given 8/30/23 2000)     Labs Ordered and Resulted from Time of ED Arrival to Time of ED Departure   COMPREHENSIVE METABOLIC PANEL - Abnormal       Result Value    Sodium 144      Potassium 3.7      Chloride 101      Carbon Dioxide (CO2) 29      Anion Gap 14      Urea Nitrogen 8.4      Creatinine 0.60 (*)     Calcium 9.0      Glucose 123 (*)     Alkaline Phosphatase 79      AST 38      ALT 26      Protein Total 7.4      Albumin 4.8      Bilirubin Total 0.3      GFR Estimate >90     ETHYL ALCOHOL LEVEL - Abnormal    Alcohol ethyl 0.44 (*)    LIPASE - Normal    Lipase 43     CBC WITH PLATELETS AND DIFFERENTIAL    WBC Count 5.8      RBC Count 4.80      Hemoglobin 14.6      Hematocrit 40.0      MCV 83      MCH 30.4      MCHC 36.5      RDW 13.0      Platelet Count 203      % Neutrophils 47      % Lymphocytes 45      % Monocytes 5      % Eosinophils 1      % Basophils 1      % Immature Granulocytes 1      NRBCs per 100 WBC 0      Absolute Neutrophils 2.8      Absolute Lymphocytes 2.6      Absolute Monocytes 0.3      Absolute Eosinophils 0.0      Absolute Basophils 0.1      Absolute Immature Granulocytes 0.1      Absolute NRBCs 0.0       No orders to display          Critical care was not performed.     Medical Decision Making  The patient's presentation was of high complexity (a chronic illness severe  exacerbation, progression, or side effect of treatment).    The patient's evaluation involved:  review of 3+ test result(s) ordered prior to this encounter (see separate area of note for details)    The patient's management necessitated high risk (a decision regarding hospitalization).    Assessment & Plan        I have reviewed the nursing notes. I have reviewed the findings, diagnosis, plan and need for follow up with the patient.    Patient requiring medical detox at this time will be admitted to station 3 A for further stabilization and treatment.    Final diagnoses:   Alcohol dependence with intoxication with complication (H)       Shravan Schwarz  [facility] EMERGENCY DEPARTMENT  8/30/2023     Shravan Schwarz MD  08/30/23 2022       Shravan Schwarz MD  08/30/23 2041

## 2023-08-31 NOTE — ED TRIAGE NOTES
Triage Assessment       Row Name 08/30/23 1925       Triage Assessment (Adult)    Airway WDL WDL       Respiratory WDL    Respiratory WDL WDL       Skin Circulation/Temperature WDL    Skin Circulation/Temperature WDL WDL       Cardiac WDL    Cardiac WDL WDL       Peripheral/Neurovascular WDL    Peripheral Neurovascular WDL WDL       Cognitive/Neuro/Behavioral WDL    Cognitive/Neuro/Behavioral WDL WDL

## 2023-08-31 NOTE — CONSULTS
[facility]  Consult Note - Hospitalist Service  Date of Admission:  8/30/2023  Consult Requested by: Gurjit Louie MD   Reason for Consult: routine medical co-management for detox    Assessment & Plan   Martín Shoemaker is a 32 year old male admitted on 8/30/2023 to [facility] for alcohol detox. He has a history of gastric ulcer and depression.     Alcohol dependence and withdrawal  Hx of alcohol withdrawal seizure  A liter of alcohol daily for several weeks. He reports history of a withdrawal seizures, no known history of DTs.   - Management per psychiatry:    - MSSA Valium protocol   - agree with vitamin supplementation including of thiamine and folate   - PRN Tylenol, atenolol, Maalox, hydroxyzine, Zofran, trazodone   - counseling, group therapies, community resources    Hx esophageal ulcer  GERD  Patient had a hospitalization in 2021 for alcohol withdrawal.  At that time he endorsed hematemesis and EGD was done which did find an esophageal ulcer, status post clipping.  Patient denies any recent evidence of blood in his stool or vomit.  -Resume PTA Protonix 40 mg daily    Hypertension  Patient states he has a history of this.  He however is not on any chronic medications.  It appears per chart review that this is only really noted in acute hospitalization settings.  His blood pressures have been fairly appropriate here.    Depression  -Management per psychiatry   - Defer resumption of PTA sertraline and hydroxyzine to their service    History of alcoholic hepatitis without ascites    Medicine service will sign off. Thank you for involving us in the care of this patient. Please consult or contact us with any new medical questions or concerns.     The patient's care was discussed with patient. Plan of care communicated to primary team via this note.     Clinically Significant Risk Factors Present on Admission                       # Overweight: Estimated body mass index is  27.12 kg/m  as calculated from the following:    Height as of this encounter: 1.829 m (6').    Weight as of this encounter: 90.7 kg (200 lb).              Kristina Justice PA-C  Hospitalist Service  Securely message with [employer] (more info)  Text page via [facility] Paging/Directory   ______________________________________________________________________    Chief Complaint   tired    History is obtained from the patient    History of Present Illness   Martín Shoemaker is a 32 year old male who is seen in the detox unit for medical evaluation.  Patient was very sleepy, history is limited due to his sleepiness, fell asleep frequently during our visit. He denies significant pain or discomfort. Just endorses sleepiness.       Past Medical History    Past Medical History:   Diagnosis Date    Depressive disorder     Hypertension     pt reports he takes meds for HTN    Substance abuse (H)     Ulcer, gastric, acute        Past Surgical History   Past Surgical History:   Procedure Laterality Date    ADENOIDECTOMY      ENDOSCOPY      TONSILLECTOMY Bilateral        Medications   Medications Prior to Admission   Medication Sig Dispense Refill Last Dose    ferrous sulfate (FE TABS) 325 (65 Fe) MG EC tablet Take 1 tablet (325 mg) by mouth daily 30 tablet 0 Unknown    folic acid (FOLVITE) 1 MG tablet Take 1 tablet (1 mg) by mouth daily 30 tablet 0 Unknown    hydrOXYzine (ATARAX) 25 MG tablet Take 1-2 tablets (25-50 mg) by mouth 3 times daily as needed for anxiety 60 tablet 0 Unknown    multivitamin, therapeutic (THERA-VIT) TABS tablet Take 1 tablet by mouth daily 30 tablet 0 Unknown    pantoprazole (PROTONIX) 40 MG EC tablet Take 1 tablet (40 mg) by mouth every morning (before breakfast) 30 tablet 0 Unknown    sertraline (ZOLOFT) 100 MG tablet Take 1 tablet (100 mg) by mouth daily 30 tablet 0 Unknown    thiamine (B-1) 100 MG tablet Take 1 tablet (100 mg) by mouth daily 30 tablet 0 Unknown    traZODone (DESYREL) 50 MG tablet Take 1-2 tablets  ( mg) by mouth nightly as needed for sleep 60 tablet 0 Past Week          Review of Systems    ROS limited due to patient sleepiness.     Physical Exam   Vital Signs: Temp: 97.9  F (36.6  C) Temp src: Oral BP: (!) 131/94 Pulse: 89   Resp: 18 SpO2: 97 % O2 Device: None (Room air)    Weight: 200 lbs 0 oz    GENERAL: adult male lying on his side or supine in bed, sleepy. NAD.   NEURO / PSYCH: Sleeping initially, arousable, falls asleep frequently. Responses appropriate when present. No focal deficits. Moves all extremities.   HEENT: Anicteric sclera. PERRL.  CV: RRR. S1, S2. No murmurs appreciated.    RESPIRATORY: Effort normal. Lungs CTAB with no wheezing, rales, rhonchi.   MSK: no gross deformities, appropriate gait.   EXTREMITIES: nonedematous  SKIN: No jaundice. No rashes or lesions to exposed areas.     Medical Decision Making       35 MINUTES SPENT BY ME on the date of service doing chart review, history, exam, documentation & further activities per the note.      Data     I have personally reviewed the following data over the past 24 hrs:    5.8  \   14.6   / 203     144 101 8.4 /  123 (H)   3.7 29 0.60 (L) \     ALT: 26 AST: 38 AP: 79 TBILI: 0.3   ALB: 4.8 TOT PROTEIN: 7.4 LIPASE: 43

## 2023-08-31 NOTE — PLAN OF CARE
"  Problem: Adult Behavioral Health Plan of Care  Goal: Plan of Care Review  Outcome: Progressing     Problem: Alcohol Withdrawal  Goal: Alcohol Withdrawal Symptom Control  Outcome: Progressing   Goal Outcome Evaluation:         Per report, patient has been drinking a liter of Vodka a day for several weeks if not months and the last drink was two hours prior to this admission. However, pt stated that he has not been drinking consistently. Patient's dad brought pt in to seek inpatient detox from alcohol. That pt \"was sober for a month patient did have treatment 2 years ago since that time he also attempted to detox himself and has had an alcohol withdrawal seizure he denies any history of DTs. No other significant psychiatric or medical history is noted\". Pt remained pleasant and calm throughout the admission process. He is compliant with the admission process. MSSA score = 5. No intervention needed at this time. Pt is presently sleeping comfortable with no signs of discomfort. Will continue to monitor and treat as needed.                 "

## 2023-08-31 NOTE — ED TRIAGE NOTES
Patient reports he has been drinking too much. Patient reports he came back to [place] today from [place]. Patient thinks he drinks maybe a liter a day. Patient denies seizure history. Patient reports his last drink was 2 hours before coming in.

## 2023-08-31 NOTE — PROGRESS NOTES
Triage & Transition Services, [address]     Martín Shoemaker  August 31, 2023    Insurance: [employer]     Legal Status: Volentary     SUDs Assessment Status: Declined      ROIs on file: None     Living Situation: With family      Current Providers and Supports:  Family     Encounter: Pt and writer met and discussed pt use and if he was going to attend treatment. Pt declined needing treatment. Pt said he drank to much on the plane and continued drinking when he got home. Pt wants detox only as he has a job he has to get back to       Collateral: None     Consulted with Treatment Team  on Patient s plan of care.          Current Plan: Detox Only      RN updated.    ANTONELLA FELICIANO  Triage & Transition Services - Mental Health and Addiction Service Line  [address] - Adult Inpatient Addiction Psychiatry Unit

## 2023-08-31 NOTE — TELEPHONE ENCOUNTER
R: Patient cleared and ready for behavioral bed placement: Yes    12AM Intake called array.     12:05am Intake received a call from array accepting this pt to [facility].     12:38am Intake called [facility] and provided dispostion to charge nurse. Nurse report: Charge will call.     12:40am Intake called [facility] and provided placement information to [facility].

## 2023-08-31 NOTE — PROGRESS NOTES
Behavioral Team Discussion: (8/31/2023)    Continued Stay Criteria/Rationale: Patient admitted for  Alcohol  Use Disorder.  Plan: The following services will be provided to the patient; psychiatric assessment, medication management, therapeutic milieu, individual and group support, and skills groups.   Participants: 3A Provider: Dr. Reyna Mcdaniels MD; 3A RN:  Kaylie Li, RN; 3A CM's:  Luis Carey .  Summary/Recommendation: Providers will assess today for treatment recommendations, discharge planning, and aftercare plans. Luis Carey CM will meet with pt for discharge planning.   Medical/Physical: None  Precautions:   Behavioral Orders   Procedures    Code 1 - Restrict to Unit    Routine Programming     As clinically indicated    Seizure precautions    Status 15     Every 15 minutes.    Withdrawal precautions     Rationale for change in precautions or plan: N/A  Progress: No Change.    ASAM Dimension Scale Ratings:  Dimension 1: 3 Client tolerates and brad with withdrawal discomfort poorly. Client has severe intoxication, such that the client endangers self or others, or intoxication has not abated with less intensive levels of services. Client displays severe signs and symptoms; or risk of severe, but manageable withdrawal; or withdrawal worsening despite detox at less intensive level.  Dimension 2: 1 Client tolerates and brad with physical discomfort and is able to get the services that the client needs.  Dimension 3: 2 Client has difficulty with impulse control and lacks coping skills. Client has thoughts of suicide or harm to others without means; however, the thoughts may interfere with participation in some treatment activities. Client has difficulty functioning in significant life areas. Client has moderate symptoms of emotional, behavioral, or cognitive problems. Client is able to participate in most treatment activities.  Dimension 4: 3 Client displays inconsistent compliance, minimal  awareness of either the client's addiction or mental disorder, and is minimally cooperative.  Dimension 5: 3 Client has poor recognition and understanding of relapse and recidivism issues and displays moderately high vulnerability for further substance use or mental health problems. Client has few coping skills and rarely applies coping skills.  Dimension 6: 3 Client is not engaged in structured, meaningful activity and the client's peers, family, significant other, and living environment are unsupportive, or there is significant criminal justice system involvement.

## 2023-08-31 NOTE — TELEPHONE ENCOUNTER
S: [facility] , Provider Christianson calling at 8:42 PM with clinical on a 32 year old/Male presenting for alcohol detox.     B: Pt presents for ETOH detox.   Currently reports drinking 1 liter daily for months.    Patient reports last use was just prior to ED.  Pt LEDY: .44  Pt  denies hx of DT  Pt  endorses hx of seizures. Last seizure:  a few months ago when attempting to detox at home  Pt endorsing the following symptoms of withdrawal:  Pt still too intoxicated  MSSA Score: Unknown; Pt still too intoxicated    -Pt has gotten banana bag in ED and will get Valium when withdrawal starts    Pt denies acute mental health or medical concerns.   Pt denies other drug use: None Amount/frequency: N/A    Does Pt have a detox care plan in Epic? None  Does pt present with specific needs, assistive devices, or exclusionary criteria? None  Is the patient ambulating, eating and drinking in the ED? Yes    A: Pt meets criteria to be presented for IP detox admission. Patient is voluntary    COVID Symptoms: No  If yes, COVID test required   Utox: Ordered, not yet collected  CMP: WNL  CBC: WNL  HCG: N/A     R: Patient cleared and ready for behavioral bed placement: Yes    Pt is meeting criteria for presentation to 3A/CD    Does Patient need a Transfer Center request created? No, Pt is located within [facility] ED, [facility] ED, or [facility]     11:09 PM Called [facility] ED and reminded Kaylie the RN to get a Utox and a new LEDY..

## 2023-09-01 VITALS
BODY MASS INDEX: 27.09 KG/M2 | SYSTOLIC BLOOD PRESSURE: 155 MMHG | TEMPERATURE: 97.8 F | HEIGHT: 72 IN | DIASTOLIC BLOOD PRESSURE: 106 MMHG | HEART RATE: 83 BPM | OXYGEN SATURATION: 97 % | RESPIRATION RATE: 16 BRPM | WEIGHT: 200 LBS

## 2023-09-01 PROCEDURE — 250N000013 HC RX MED GY IP 250 OP 250 PS 637: Performed by: STUDENT IN AN ORGANIZED HEALTH CARE EDUCATION/TRAINING PROGRAM

## 2023-09-01 PROCEDURE — 99239 HOSP IP/OBS DSCHRG MGMT >30: CPT | Performed by: PSYCHIATRY & NEUROLOGY

## 2023-09-01 PROCEDURE — 250N000013 HC RX MED GY IP 250 OP 250 PS 637: Performed by: PHYSICIAN ASSISTANT

## 2023-09-01 RX ORDER — NICOTINE 21 MG/24HR
1 PATCH, TRANSDERMAL 24 HOURS TRANSDERMAL DAILY
Qty: 28 PATCH | Refills: 1 | Status: SHIPPED | OUTPATIENT
Start: 2023-09-01

## 2023-09-01 RX ORDER — NALTREXONE HYDROCHLORIDE 50 MG/1
50 TABLET, FILM COATED ORAL DAILY
Qty: 30 TABLET | Refills: 3 | Status: SHIPPED | OUTPATIENT
Start: 2023-09-01

## 2023-09-01 RX ORDER — PANTOPRAZOLE SODIUM 40 MG/1
40 TABLET, DELAYED RELEASE ORAL
Qty: 30 TABLET | Refills: 0 | Status: SHIPPED | OUTPATIENT
Start: 2023-09-01

## 2023-09-01 RX ORDER — MULTIPLE VITAMINS W/ MINERALS TAB 9MG-400MCG
1 TAB ORAL DAILY
Qty: 30 TABLET | Refills: 1 | Status: SHIPPED | OUTPATIENT
Start: 2023-09-01

## 2023-09-01 RX ORDER — HYDROXYZINE HYDROCHLORIDE 25 MG/1
25 TABLET, FILM COATED ORAL EVERY 4 HOURS PRN
Qty: 60 TABLET | Refills: 1 | Status: SHIPPED | OUTPATIENT
Start: 2023-09-01

## 2023-09-01 RX ADMIN — PANTOPRAZOLE SODIUM 40 MG: 40 TABLET, DELAYED RELEASE ORAL at 09:07

## 2023-09-01 RX ADMIN — MULTIPLE VITAMINS W/ MINERALS TAB 1 TABLET: TAB at 09:09

## 2023-09-01 ASSESSMENT — ACTIVITIES OF DAILY LIVING (ADL)
ADLS_ACUITY_SCORE: 40
LAUNDRY: WITH SUPERVISION
ADLS_ACUITY_SCORE: 40
HYGIENE/GROOMING: INDEPENDENT
ADLS_ACUITY_SCORE: 40
DRESS: SCRUBS (BEHAVIORAL HEALTH);INDEPENDENT
ADLS_ACUITY_SCORE: 40
ADLS_ACUITY_SCORE: 40
ORAL_HYGIENE: INDEPENDENT
ADLS_ACUITY_SCORE: 40

## 2023-09-01 NOTE — DISCHARGE INSTRUCTIONS
Behavioral Discharge Planning and Instructions  THANK YOU FOR CHOOSING [address]  [phone]    Summary: You were admitted to [facility] on 08/31/2023 for detoxification from alcohol.  A medical exam was performed that included lab work. You have met with a  and opted to decline all case management services and resources.  Please take care and make your recovery a daily priority, Martín!  It was a pleasure working with you and the entire treatment team here wishes you the very best in your recovery!     Recommendation:  Obtain a Chemical Health Evaluation and follow all recommendations.    Main Diagnoses:  Per Dr. Shilpa MD;  303.90 (F10.20) Alcohol Use Disorder Severe    Major Treatments, Procedures and Findings:  You were treated for *** detoxification using ***. As an outpatient you will be prescribed ***, please take this medication as prescribed until it is gone. You did not a chemical dependency assessment. You had labs drawn and those results were reviewed with you. Please take a copy of your lab work with you to your next primary care provider appointment.    Symptoms to Report:  If you experience more anxiety, confusion, sleeplessness, deep sadness or thoughts of suicide, notify your treatment team or notify your primary care provider. IF ANY OF THE SYMPTOMS YOU ARE EXPERIENCING ARE A MEDICAL EMERGENCY CALL 911 IMMEDIATELY.     Lifestyle Adjustment: Adjust your lifestyle to get enough sleep, relaxation, exercise and good nutrition. Continue to develop healthy coping skills to decrease stress and promote a sober living environment. Do not use mood altering substances including alcohol, illegal drugs or addictive medications other than what is currently prescribed.     Disposition: Return home.    Facts about COVID19 at www.cdc.gov/COVID19 and www.[place].gov/covid19    Keeping hands clean is one of the most important steps we can take to avoid getting sick and spreading germs to  others.  Please wash your hands frequently and lather with soap for at least 20 seconds!    Follow-up Appointment:   Appointment Date/Time: ***    Psychiatrist/Primary Care Giver: ***    Address: ***    Phone Number: ***      Recovery apps for your phone to locate current in person and zoom recovery meetings  [place] - meeting dereje  AA  - meeting dereje  Meeting guide - meeting dereje  Quick NA meeting - meeting dereje  Patti- has various apps    Resources:  Some AA/NA meetings are being held online however most have returned to in-person or a hybrid combination please check online to verify*  Need Support Now? If you or someone you know is struggling or in crisis, help is available. Call or text MEDNAX9 or chat Dexetra  AA meetings search for them at: https://aa-intergroup.org (worldwide meeting listings)  AA meetings for [place] area can be found online at: https://a[place].org (click local online meetings listings)  NA meetings for [place] area can be found online at: https://www.nam[place].org  (click find a meeting)  AA and NA Sponsors are excellent resources for support and you can find one at any support group meeting.   Alcoholics Anonymous (https://aa.org/): for information 24 hours/day  AA Intergroup service office in [place] (http://www.aastpaul.org/) 732.595.6725  AA Intergroup service office in [facility]: [phone]. (http://www.aaminneaMZL Shine Cleaningis.org/)  Narcotics Anonymous (www.nam[place].org) (466) 495-3034  https://aafairviewriverside.org/meetings  SMART Recovery - self management for addiction recovery:  www.smartrecovery.org  Pathways ~ A Health Crisis Resource & Support Center:  333.317.2895.  https://prescribetoprevent.org/patient-education/videos/  http://www.harmreduction.org  [facility] 838-670-9594  Support Group:  AA/NA and Sponsor/support.  National Ruby on Mental Illness (www.[place].chasity.org): 900.201.6422 or 945-699-3566.  Alcoholics Anonymous  (www.alcoholics-anonymous.org): Check your phone book for your local chapter.  Suicide Awareness Voices of Education (SAVE) (www.save.org): 900-112-QCZX (6306)  National Suicide Prevention Line (www.mentalhealthmn.org): 534-162-EMDP (0900)  Mental Health Consumer/Survivor Network of [place] (www.mhcsn.net): 616.861.2036 or 595-333-7861  Mental Health Association of [place] (www.mentalhealth.org): 737.375.5957 or 513-667-0590   Substance Abuse and Mental Health Services (www.samhsa.gov)  [place] Opioid Prevention Coalition: www.opioidcoalition.org    [place] Recovery Connection (MRC)  [facility] connects people seeking recovery to resources that help foster and sustain long-term recovery.  Whether you are seeking resources for treatment, transportation, housing, job training, education, health care or other pathways to recovery, [facility] is a great place to start.  142.857.5149.  www.[employer] Pod casts for nutrition and wellness  Listen on Apple Podcasts  [employer] [facility]   Nutrition       Understand the connection between what you eat and how you feel. Hosted by licensed nutritionists and dietitians from [employer] we share practical, real-life solutions for healthier living through nutrition.     General Medication Instructions:   See your medication sheet(s) for instructions.   Take all medications as prescribed.  Make no changes unless your primary care provider suggests them.   Go to all your primary care provider visits.  Be sure to have all your required lab tests. This way, your medicines can be refilled on time.  Do not use any forms of alcohol.    Please Note:  If you have any questions at anytime after you are discharged please call [facility] detox unit 3AW at [phone].  [facility], Behavioral Intake [phone]  Medical Records call [phone]  Outpatient Behavioral Intake call [phone]  LP+ Wait List/Bed  Availability call [phone]    Please remember to take all of your behavioral discharge planning and lab paperwork to any follow up appointments, it contains your lab results, diagnosis, medication list and discharge recommendations.      THANK YOU FOR CHOOSING [facility]

## 2023-09-01 NOTE — CARE PLAN
"Patient has flat and blunted affect. He was out in the lounge for 16:00 vital signs and dinner. Patient ate his dinner and snack, he reported nausea and anxiety 7/10, Zofran and hydroxyzine were given to him with good effect. Patient was resting his head on the table while watching TV, RN asked if he was tired or drowsy but he denied, stated \"I  am awake, just watching TV\". Patient  seemed to be minimizing his symptoms in other to discharge tomorrow.     MSSA were 5 & 5, he denied depression,si/hi/hallucinations and related his anxiety to being on the unit. He contracted for safety.   Trazodone 50mg PRN was given and patient advised to come out after an hour if still awake for a second dose. Patient does not want treatment after discharge.  "

## 2023-09-01 NOTE — PLAN OF CARE
Goal Outcome Evaluation:    MSSA score of 3, patient did not require medication for alcohol withdrawal and is observe to sleep throughout the noc.

## 2023-09-01 NOTE — PROGRESS NOTES
Brief Medicine Progress Note  September 1, 2023     Spoke w/ Dr. Massey, Psychiatry, regarding patient's blood pressure readings. Per this discussion, pt does have a hx of HTN but is not on any PTA medications as his elevated pressure recordings tend to be in the acute hospital setting. Patient does not currently have a PCP and he is not able to get an appointment until October. Dr. Massey is wondering if we should start him on an antihypertensive agent upon discharge.     Reviewed patient's vitals throughout stay. In general, it appears his blood pressures tend to range from normotensive to SBPs in the 150s. Today, he has one DBP of 106, but otherwise his DBP has not been markedly elevated. Given the history that he has a known pattern of elevated BP in the hospital setting and he is asymptomatic, I do not feel strongly that he needs to be started on an antihypertensive agent on discharge. In addition, if/when he is started on an agent, I would prefer he have a PCP to follow up with closely to ensure he is not having any adverse medication reactions and to ensure he does not become hypotensive and face subsequent consequences.     I am reassured by the fact that the patient is asymptomatic and that he has had elevated blood pressure readings in this setting previously.     Plan:  - do not recommend initiation of anti-hypertensive therapy on discharge  - please  patient to seek care at ED if he develops chest pain, thunderclap headache, acute vision changes      Abel Snowden PA-C  [facility] Hospitalist Service  Page via [employer] or [facility]

## 2023-09-01 NOTE — PLAN OF CARE
Goal Outcome Evaluation:    Plan of Care Reviewed With: patient        Pt alert and oriented x4, able to make needs known, cooperated with assessment. Pt out of detox this morning and pt stated he is ready for discharge. Pt expressed mild anxiety and depression for being here and declined any pharmacological intervention but preferred non pharmacological intervention. Pt denies all other mental health psych symptoms and contracted for safety. Pt continue having high /106 and Dr. Massey paged and IM paged. Pt made an appointment octocber 4th due to no opening. This writer also called to made the  said no opening. Dr. Massey and IM updated. IM gave an ok for pt to discharge. Pt picked up by father at 1500 and pt denies dizziness or headache before discharging.

## 2023-09-01 NOTE — DISCHARGE SUMMARY
Psychiatric Discharge Summary    Martín Shoemaker MRN# [id number]   Age: 32 year old YOB: 1990     Date of Admission:  8/30/2023  Date of Discharge:  9/1/2023  4:47 PM  Admitting Physician:  Reyna Mcdaniels MD  Discharge Physician:  Wing Massey MD          Event Leading to Hospitalization:   Patient reports he was sober however he went on vacation and relapsed and has been drinking since his vacation 1 L of alcohol.  He was in [place] his family intervened and brought him back he was admitted with 0.44     Patient has been using the following substances: Alcohol  Started at age 13 or 58, became a problem at 4 years ago after a bad break-up.  He does have withdrawal seizures.  Prompted him to get help this time for his withdrawal  His withdrawal symptoms including nausea tiredness headaches shakiness     Patient has tolerance, withdrawal, progressive use, loss of control, spending more time and more amount than intended. Patient has made attempts to quit, is experiencing cravings, and reports negative consequences.                    Patient does have a history of seizures.  Patient does not have a history of delirium tremens.       See Admission note for additional details.          Diagnoses:     Alcohol use disorder, severe  Alcohol withdrawal with unspecified complication          Labs:        Lab Results   Component Value Date     08/30/2023     06/28/2021    Lab Results   Component Value Date    CHLORIDE 101 08/30/2023    CHLORIDE 97 10/06/2021    CHLORIDE 104 06/28/2021    Lab Results   Component Value Date    BUN 8.4 08/30/2023    BUN 7 10/06/2021    BUN 16 06/28/2021      Lab Results   Component Value Date    POTASSIUM 3.7 08/30/2023    POTASSIUM 3.6 10/06/2021    POTASSIUM 4.3 06/28/2021    Lab Results   Component Value Date    CO2 29 08/30/2023    CO2 29 10/06/2021    CO2 29 06/28/2021    Lab Results   Component Value Date    CR 0.60 08/30/2023    CR 0.70 06/28/2021           Lab Results   Component Value Date    WBC 5.8 08/30/2023    HGB 14.6 08/30/2023    HCT 40.0 08/30/2023    MCV 83 08/30/2023     08/30/2023     Lab Results   Component Value Date    AST 38 08/30/2023    ALT 26 08/30/2023    GGT 80 (H) 08/06/2021    ALKPHOS 79 08/30/2023    BILITOTAL 0.3 08/30/2023     Lab Results   Component Value Date    TSH 1.99 08/06/2021            Consults:   Consultation during this admission received from internal medicine:  Martín Shoemaker is a 32 year old male admitted on 8/30/2023 to [facility] for alcohol detox. He has a history of gastric ulcer and depression.      Alcohol dependence and withdrawal  Hx of alcohol withdrawal seizure  A liter of alcohol daily for several weeks. He reports history of a withdrawal seizures, no known history of DTs.   - Management per psychiatry:               - MSSA Valium protocol              - agree with vitamin supplementation including of thiamine and folate              - PRN Tylenol, atenolol, Maalox, hydroxyzine, Zofran, trazodone              - counseling, group therapies, community resources     Hx esophageal ulcer  GERD  Patient had a hospitalization in 2021 for alcohol withdrawal.  At that time he endorsed hematemesis and EGD was done which did find an esophageal ulcer, status post clipping.  Patient denies any recent evidence of blood in his stool or vomit.  -Resume PTA Protonix 40 mg daily     Hypertension  Patient states he has a history of this.  He however is not on any chronic medications.  It appears per chart review that this is only really noted in acute hospitalization settings.  His blood pressures have been fairly appropriate here.     Depression  -Management per psychiatry              - Defer resumption of PTA sertraline and hydroxyzine to their service     History of alcoholic hepatitis without ascites         Hospital Course:   Martín Shoemaker was admitted to [facility] with attending Reyna Mcdaniels MD as a voluntary  patient. The patient was placed under status 15 (15 minute checks) to ensure patient safety.   MSSA protocol was initiated due to the patient's history of alcohol abuse and concern for withdrawal symptoms.  CBC, BMP and utox obtained.    All outpatient medications were continued. Naltrexone was provided at discharge. Discussed high BP with internal medicine.     Martín Shoemaker did participate in groups and was visible in the milieu.     The patient's symptoms of withdrawal improved.     Martín Shoemaker was released to home. At the time of discharge Martín Shoemaker was determined to not be a danger to himself or others. At the current time of discharge, the patient does not meet criteria for involuntary hospitalization. On the day of discharge, the patient reports that they do not have suicidal or homicidal ideation and would never hurt themselves or others. Steps taken to minimize risk include: assessing patient s behavior and thought process daily during hospital stay, discharging patient with adequate plan for follow up for mental and physical health and discussing safety plan of returning to the hospital should the patient ever have thoughts of harming themselves or others. Therefore, based on all available evidence including the factors cited above, the patient does not appear to be at imminent risk for self-harm, and is appropriate for outpatient level of care.           Discharge Medications:     Current Discharge Medication List        START taking these medications    Details   hydrOXYzine (ATARAX) 25 MG tablet Take 1 tablet (25 mg) by mouth every 4 hours as needed for anxiety  Qty: 60 tablet, Refills: 1    Associated Diagnoses: Alcohol dependence with uncomplicated intoxication (H)      multivitamin w/minerals (THERA-VIT-M) tablet Take 1 tablet by mouth daily  Qty: 30 tablet, Refills: 1    Associated Diagnoses: Alcohol dependence with intoxication with complication (H)      naltrexone (DEPADE/REVIA) 50 MG  tablet Take 1 tablet (50 mg) by mouth daily  Qty: 30 tablet, Refills: 3    Associated Diagnoses: Alcohol dependence with intoxication with complication (H)      nicotine (NICODERM CQ) 14 MG/24HR 24 hr patch Place 1 patch onto the skin daily  Qty: 28 patch, Refills: 1    Associated Diagnoses: Chemical dependency (H)      nicotine (NICORETTE) 2 MG gum Place 1 each (2 mg) inside cheek every hour as needed for other (nicotine withdrawal symptoms)  Qty: 100 each, Refills: 1    Associated Diagnoses: Chemical dependency (H)           CONTINUE these medications which have CHANGED    Details   pantoprazole (PROTONIX) 40 MG EC tablet Take 1 tablet (40 mg) by mouth every morning (before breakfast)  Qty: 30 tablet, Refills: 0    Associated Diagnoses: Gastroesophageal reflux disease with esophagitis, unspecified whether hemorrhage                  Psychiatric Examination:   Appearance:  awake, alert and adequately groomed  Attitude:  cooperative  Eye Contact:  good  Mood:  good  Affect:  mood congruent  Speech:  clear, coherent  Psychomotor Behavior:  no evidence of tardive dyskinesia, dystonia, or tics  Thought Process:  goal oriented  Associations:  no loose associations  Thought Content:  no evidence of suicidal ideation or homicidal ideation and no evidence of psychotic thought  Insight:  fair  Judgment:  fair  Oriented to:  time, person, and place  Attention Span and Concentration:  intact  Recent and Remote Memory:  fair  Language: Able to read and write  Fund of Knowledge: appropriate  Muscle Strength and Tone: normal  Gait and Station: Normal         Discharge Plan:   Continue medications as above.     Recommendation:  Obtain a Chemical Health Evaluation and follow all recommendations.     Symptoms to Report:  If you experience more anxiety, confusion, sleeplessness, deep sadness or thoughts of suicide, notify your treatment team or notify your primary care provider. IF ANY OF THE SYMPTOMS YOU ARE EXPERIENCING ARE A  MEDICAL EMERGENCY CALL 911 IMMEDIATELY.      Lifestyle Adjustment: Adjust your lifestyle to get enough sleep, relaxation, exercise and good nutrition. Continue to develop healthy coping skills to decrease stress and promote a sober living environment. Do not use mood altering substances including alcohol, illegal drugs or addictive medications other than what is currently prescribed.      Disposition: Return home.    Attestation:    The patient was seen and evaluated by me. I spent more than 30 minutes on discharge day activities. Wing Massey MD

## 2025-04-02 ENCOUNTER — HOSPITAL ENCOUNTER (EMERGENCY)
Facility: CLINIC | Age: 35
Discharge: HOME | End: 2025-04-02
Attending: EMERGENCY MEDICINE | Admitting: EMERGENCY MEDICINE
Payer: COMMERCIAL

## 2025-04-02 VITALS
BODY MASS INDEX: 27.9 KG/M2 | HEIGHT: 72 IN | SYSTOLIC BLOOD PRESSURE: 139 MMHG | RESPIRATION RATE: 16 BRPM | WEIGHT: 206 LBS | OXYGEN SATURATION: 97 % | HEART RATE: 117 BPM | TEMPERATURE: 99.4 F | DIASTOLIC BLOOD PRESSURE: 107 MMHG

## 2025-04-02 DIAGNOSIS — R56.9 SEIZURE (H): ICD-10-CM

## 2025-04-02 DIAGNOSIS — F10.21 HISTORY OF ALCOHOL DEPENDENCE (H): ICD-10-CM

## 2025-04-02 DIAGNOSIS — R79.89 ELEVATED LFTS: ICD-10-CM

## 2025-04-02 DIAGNOSIS — R79.89 ELEVATED SERUM CREATININE: ICD-10-CM

## 2025-04-02 DIAGNOSIS — E83.42 HYPOMAGNESEMIA: ICD-10-CM

## 2025-04-02 LAB
ALBUMIN SERPL BCG-MCNC: 4.7 G/DL (ref 3.5–5.2)
ALP SERPL-CCNC: 104 U/L (ref 40–150)
ALT SERPL W P-5'-P-CCNC: 194 U/L (ref 0–70)
ANION GAP SERPL CALCULATED.3IONS-SCNC: 28 MMOL/L (ref 7–15)
AST SERPL W P-5'-P-CCNC: 210 U/L (ref 0–45)
BILIRUB DIRECT SERPL-MCNC: 0.59 MG/DL (ref 0–0.3)
BILIRUB SERPL-MCNC: 1.4 MG/DL
BUN SERPL-MCNC: 20.4 MG/DL (ref 6–20)
CALCIUM SERPL-MCNC: 10.1 MG/DL (ref 8.8–10.4)
CHLORIDE SERPL-SCNC: 84 MMOL/L (ref 98–107)
CREAT SERPL-MCNC: 1.41 MG/DL (ref 0.67–1.17)
EGFRCR SERPLBLD CKD-EPI 2021: 66 ML/MIN/1.73M2
ERYTHROCYTE [DISTWIDTH] IN BLOOD BY AUTOMATED COUNT: 11.4 % (ref 10–15)
ETHANOL SERPL-MCNC: <0.01 G/DL
GLUCOSE SERPL-MCNC: 191 MG/DL (ref 70–99)
HCO3 SERPL-SCNC: 20 MMOL/L (ref 22–29)
HCT VFR BLD AUTO: 40.4 % (ref 40–53)
HGB BLD-MCNC: 14.8 G/DL (ref 13.3–17.7)
HOLD SPECIMEN: NORMAL
HOLD SPECIMEN: NORMAL
MAGNESIUM SERPL-MCNC: 1.6 MG/DL (ref 1.7–2.3)
MAGNESIUM SERPL-MCNC: 1.6 MG/DL (ref 1.7–2.3)
MCH RBC QN AUTO: 31.8 PG (ref 26.5–33)
MCHC RBC AUTO-ENTMCNC: 36.6 G/DL (ref 31.5–36.5)
MCV RBC AUTO: 87 FL (ref 78–100)
PHOSPHATE SERPL-MCNC: 3.6 MG/DL (ref 2.5–4.5)
PLATELET # BLD AUTO: 131 10E3/UL (ref 150–450)
POTASSIUM SERPL-SCNC: 3 MMOL/L (ref 3.4–5.3)
PROT SERPL-MCNC: 7.2 G/DL (ref 6.4–8.3)
RBC # BLD AUTO: 4.65 10E6/UL (ref 4.4–5.9)
SODIUM SERPL-SCNC: 132 MMOL/L (ref 135–145)
WBC # BLD AUTO: 6.5 10E3/UL (ref 4–11)

## 2025-04-02 PROCEDURE — 80051 ELECTROLYTE PANEL: CPT | Performed by: EMERGENCY MEDICINE

## 2025-04-02 PROCEDURE — 250N000013 HC RX MED GY IP 250 OP 250 PS 637: Performed by: EMERGENCY MEDICINE

## 2025-04-02 PROCEDURE — 250N000011 HC RX IP 250 OP 636: Performed by: EMERGENCY MEDICINE

## 2025-04-02 PROCEDURE — 84100 ASSAY OF PHOSPHORUS: CPT | Performed by: EMERGENCY MEDICINE

## 2025-04-02 PROCEDURE — 93005 ELECTROCARDIOGRAM TRACING: CPT | Performed by: EMERGENCY MEDICINE

## 2025-04-02 PROCEDURE — 99285 EMERGENCY DEPT VISIT HI MDM: CPT | Mod: 25 | Performed by: EMERGENCY MEDICINE

## 2025-04-02 PROCEDURE — 258N000003 HC RX IP 258 OP 636: Performed by: EMERGENCY MEDICINE

## 2025-04-02 PROCEDURE — 80076 HEPATIC FUNCTION PANEL: CPT | Performed by: EMERGENCY MEDICINE

## 2025-04-02 PROCEDURE — 82077 ASSAY SPEC XCP UR&BREATH IA: CPT | Performed by: EMERGENCY MEDICINE

## 2025-04-02 PROCEDURE — 96360 HYDRATION IV INFUSION INIT: CPT | Performed by: EMERGENCY MEDICINE

## 2025-04-02 PROCEDURE — 84075 ASSAY ALKALINE PHOSPHATASE: CPT | Performed by: EMERGENCY MEDICINE

## 2025-04-02 PROCEDURE — 36415 COLL VENOUS BLD VENIPUNCTURE: CPT | Performed by: EMERGENCY MEDICINE

## 2025-04-02 PROCEDURE — 84155 ASSAY OF PROTEIN SERUM: CPT | Performed by: EMERGENCY MEDICINE

## 2025-04-02 PROCEDURE — 96365 THER/PROPH/DIAG IV INF INIT: CPT | Performed by: EMERGENCY MEDICINE

## 2025-04-02 PROCEDURE — 82947 ASSAY GLUCOSE BLOOD QUANT: CPT | Performed by: EMERGENCY MEDICINE

## 2025-04-02 PROCEDURE — 85014 HEMATOCRIT: CPT | Performed by: EMERGENCY MEDICINE

## 2025-04-02 PROCEDURE — 83735 ASSAY OF MAGNESIUM: CPT | Performed by: EMERGENCY MEDICINE

## 2025-04-02 PROCEDURE — 85041 AUTOMATED RBC COUNT: CPT | Performed by: EMERGENCY MEDICINE

## 2025-04-02 RX ORDER — GABAPENTIN 300 MG/1
600 CAPSULE ORAL EVERY 8 HOURS
Status: DISCONTINUED | OUTPATIENT
Start: 2025-04-06 | End: 2025-04-02 | Stop reason: HOSPADM

## 2025-04-02 RX ORDER — LORAZEPAM 1 MG/1
1-2 TABLET ORAL EVERY 30 MIN PRN
Status: DISCONTINUED | OUTPATIENT
Start: 2025-04-02 | End: 2025-04-02 | Stop reason: HOSPADM

## 2025-04-02 RX ORDER — GABAPENTIN 300 MG/1
300 CAPSULE ORAL EVERY 8 HOURS
Status: DISCONTINUED | OUTPATIENT
Start: 2025-04-08 | End: 2025-04-02 | Stop reason: HOSPADM

## 2025-04-02 RX ORDER — FOLIC ACID 1 MG/1
1 TABLET ORAL DAILY
Status: DISCONTINUED | OUTPATIENT
Start: 2025-04-02 | End: 2025-04-02 | Stop reason: HOSPADM

## 2025-04-02 RX ORDER — GABAPENTIN 100 MG/1
100 CAPSULE ORAL EVERY 8 HOURS
Status: DISCONTINUED | OUTPATIENT
Start: 2025-04-10 | End: 2025-04-02 | Stop reason: HOSPADM

## 2025-04-02 RX ORDER — LORAZEPAM 2 MG/ML
1-2 INJECTION INTRAMUSCULAR EVERY 30 MIN PRN
Status: DISCONTINUED | OUTPATIENT
Start: 2025-04-02 | End: 2025-04-02 | Stop reason: HOSPADM

## 2025-04-02 RX ORDER — MULTIPLE VITAMINS W/ MINERALS TAB 9MG-400MCG
1 TAB ORAL DAILY
Status: DISCONTINUED | OUTPATIENT
Start: 2025-04-02 | End: 2025-04-02 | Stop reason: HOSPADM

## 2025-04-02 RX ORDER — MAGNESIUM SULFATE HEPTAHYDRATE 40 MG/ML
2 INJECTION, SOLUTION INTRAVENOUS ONCE
Status: COMPLETED | OUTPATIENT
Start: 2025-04-02 | End: 2025-04-02

## 2025-04-02 RX ORDER — GABAPENTIN 300 MG/1
900 CAPSULE ORAL EVERY 8 HOURS
Status: DISCONTINUED | OUTPATIENT
Start: 2025-04-03 | End: 2025-04-02 | Stop reason: HOSPADM

## 2025-04-02 RX ORDER — ALLOPURINOL 100 MG/1
100 TABLET ORAL DAILY
COMMUNITY
Start: 2025-04-02

## 2025-04-02 RX ORDER — GABAPENTIN 600 MG/1
1200 TABLET ORAL ONCE
Status: COMPLETED | OUTPATIENT
Start: 2025-04-02 | End: 2025-04-02

## 2025-04-02 RX ORDER — DIAZEPAM 5 MG/1
10 TABLET ORAL ONCE
Status: COMPLETED | OUTPATIENT
Start: 2025-04-02 | End: 2025-04-02

## 2025-04-02 RX ADMIN — FOLIC ACID 1 MG: 1 TABLET ORAL at 17:19

## 2025-04-02 RX ADMIN — THIAMINE HCL TAB 100 MG 100 MG: 100 TAB at 17:19

## 2025-04-02 RX ADMIN — Medication 1 TABLET: at 17:19

## 2025-04-02 RX ADMIN — SODIUM CHLORIDE 1000 ML: 9 INJECTION, SOLUTION INTRAVENOUS at 16:48

## 2025-04-02 RX ADMIN — MAGNESIUM SULFATE HEPTAHYDRATE 2 G: 40 INJECTION, SOLUTION INTRAVENOUS at 17:18

## 2025-04-02 ASSESSMENT — LIFESTYLE VARIABLES
TOTAL SCORE: 11
ORIENTATION AND CLOUDING OF SENSORIUM: ORIENTED AND CAN DO SERIAL ADDITIONS
VISUAL DISTURBANCES: NOT PRESENT
PAROXYSMAL SWEATS: 2
AUDITORY DISTURBANCES: NOT PRESENT
TREMOR: 5
NAUSEA AND VOMITING: NO NAUSEA AND NO VOMITING
HEADACHE, FULLNESS IN HEAD: NOT PRESENT
ANXIETY: MODERATELY ANXIOUS, OR GUARDED, SO ANXIETY IS INFERRED
AGITATION: NORMAL ACTIVITY

## 2025-04-02 ASSESSMENT — COLUMBIA-SUICIDE SEVERITY RATING SCALE - C-SSRS
1. IN THE PAST MONTH, HAVE YOU WISHED YOU WERE DEAD OR WISHED YOU COULD GO TO SLEEP AND NOT WAKE UP?: NO
2. HAVE YOU ACTUALLY HAD ANY THOUGHTS OF KILLING YOURSELF IN THE PAST MONTH?: NO
6. HAVE YOU EVER DONE ANYTHING, STARTED TO DO ANYTHING, OR PREPARED TO DO ANYTHING TO END YOUR LIFE?: NO

## 2025-04-02 ASSESSMENT — ACTIVITIES OF DAILY LIVING (ADL)
ADLS_ACUITY_SCORE: 52

## 2025-04-02 NOTE — ED TRIAGE NOTES
Pt arrives via EMS following estimated 2 min seizure, patient's car alarm went off, PD was called to respond, and patient found unconscious / reportedly postictal. Patient does not think he had a seizure, states he fell asleep in his car. Pt does state he has a history of alcohol withdrawal seizures, but last seizure was when pt was in college. Several abrasions to inside of mouth, dried blood around lips but pt reports these are not new injuries. Admits to alcohol use today, states he had one bloody hayder this morning before 0700. A&Ox4.

## 2025-04-02 NOTE — ED PROVIDER NOTES
Emergency Department Note      History of Present Illness     Chief Complaint   Seizures      HPI   Martín Shoemaker is a 34 year old male presenting from EMS after a seizure.  States he was in his car in the parking lot of [employer] talking to a friend on the phone and next thing he remembered were the  knocking on the window and stating he had had a seizure.  Associated tongue biting, no urinary or bowel incontinence.  Feels very thirsty.  Denies vomiting, diarrhea, recent falls, recent illness symptoms.  States one prior seizure but does not know why.  Per triage nurse he initially denies alcohol use to EMS but later mentioned a bloody hayder this morning.  To me he states no alcohol in approximately 3 weeks and at that time had two beers.  Denies stimulant use.  Is tachycardic and tremulous.    Independent Historian   Patient, EMS report per nursing as they were no longer present when I met with him    Review of External Notes   Per ED course    Past Medical History     Medical History and Problem List   Past Medical History:   Diagnosis Date    Depressive disorder     Hypertension     Substance abuse (H)     Ulcer, gastric, acute        Medications   allopurinol (ZYLOPRIM) 100 MG tablet  hydrOXYzine (ATARAX) 25 MG tablet  multivitamin w/minerals (THERA-VIT-M) tablet  naltrexone (DEPADE/REVIA) 50 MG tablet  nicotine (NICODERM CQ) 14 MG/24HR 24 hr patch  nicotine (NICORETTE) 2 MG gum  pantoprazole (PROTONIX) 40 MG EC tablet        Surgical History   Past Surgical History:   Procedure Laterality Date    ADENOIDECTOMY      ENDOSCOPY      TONSILLECTOMY Bilateral        Physical Exam     Patient Vitals for the past 24 hrs:   BP Temp Temp src Pulse Resp SpO2 Height Weight   04/02/25 1801 (!) 139/107 -- -- 117 16 97 % -- --   04/02/25 1730 (!) 127/92 -- -- 120 12 95 % -- --   04/02/25 1725 (!) 142/91 -- -- (!) 125 13 96 % -- --   04/02/25 1644 134/80 -- -- (!) 133 17 96 % -- --   04/02/25 1629 139/86 -- -- (!)  130 13 96 % -- --   04/02/25 1603 (!) 139/94 99.4  F (37.4  C) Oral (!) 143 16 95 % 1.829 m (6') 93.4 kg (206 lb)     Physical Exam  Eyes:  Sclera white; Pupils are equal and round  ENT:    External ears and nares normal, tongue bite and hematoma left side  CV:  Rate as above with regular rhythm   Resp:  Breath sounds clear and equal bilaterally    Non-labored, no retractions or accessory muscle use  GI:  Abdomen is soft, non-tender, non-distended    No rebound tenderness or peritoneal features  MS:  Moves all extremities  Skin:  Warm and dry  Neuro:  Speech is normal and fluent. Tremors in outstretched hands.      Diagnostics     Lab Results   Labs Ordered and Resulted from Time of ED Arrival to Time of ED Departure   CBC WITH PLATELETS - Abnormal       Result Value    WBC Count 6.5      RBC Count 4.65      Hemoglobin 14.8      Hematocrit 40.4      MCV 87      MCH 31.8      MCHC 36.6 (*)     RDW 11.4      Platelet Count 131 (*)    BASIC METABOLIC PANEL - Abnormal    Sodium 132 (*)     Potassium 3.0 (*)     Chloride 84 (*)     Carbon Dioxide (CO2) 20 (*)     Anion Gap 28 (*)     Urea Nitrogen 20.4 (*)     Creatinine 1.41 (*)     GFR Estimate 66      Calcium 10.1      Glucose 191 (*)    HEPATIC FUNCTION PANEL - Abnormal    Protein Total 7.2      Albumin 4.7      Bilirubin Total 1.4 (*)     Alkaline Phosphatase 104       (*)      (*)     Bilirubin Direct 0.59 (*)    MAGNESIUM - Abnormal    Magnesium 1.6 (*)    MAGNESIUM - Abnormal    Magnesium 1.6 (*)    ETHYL ALCOHOL LEVEL - Normal    Alcohol ethyl <0.01     PHOSPHORUS - Normal    Phosphorus 3.6         Imaging   Head CT w/o contrast   Final Result   IMPRESSION:   1.  No acute intracranial process.          EKG   ECG taken at 1608, ECG read at 1611 rapid, 1650 comparison to prior  Sinus tachycardia, rightward axis, possible inferior infarct age undetermined, abnormal ECG  Heart rate has increased but morphology is overall similar including S1 Q3 pattern  as compared to prior, dated 10/5/21 per myself.  Rate 137 bpm. VA interval 150 ms. QRS duration 90 ms. QT/QTc 278/419 ms. P-R-T axes 24 91 13.    Independent Interpretation   CT Head: No intracranial hemorrhage.    ED Course      Medications Administered   Medications   diazepam (VALIUM) tablet 10 mg (has no administration in time range)   sodium chloride 0.9% BOLUS 1,000 mL (has no administration in time range)       Procedures   Procedures     Discussion of Management   None    ED Course   ED Course as of 04/02/25 1717 Wed Apr 02, 2025 1713 Prior ED visit for seizure noted in December 2020.  Multiple ED visits before and after related to alcohol.  At that time he had noted a childhood history of epilepsy and one seizure in his 20s.  Trigger thought to be combination of sleep deprivation and possible alcohol withdrawal.  Alcohol was undetectable.  Keppra was prescribed at that time.   1716 Most recent visit was an admission for alcohol relapse in August 2023.       Additional Documentation  None    Medical Decision Making / Diagnosis     CMS Diagnoses: None    MIPS       None    MDM   EKG w/o ischemia, dysrhythmia, or pericarditis.  Vital signs, tremor, and history of alcohol use with prior seizure are all concerning for alcohol withdrawal seizure.  However is denying all recent alcohol use to me, even the bloody hayder he eventually mentioned to EMS.  Benzodiazepines ordered and patient did not want them.  Has a drug screen for work tomorrow and does not want to discuss why he was in the hospital.  Drives for work.  Discussed that individuals in [place] are required to report seizures within 30 days to the state.  Failure to do so could result in 's license revocation for 6 months.  I also discussed the dangerousness of alcohol withdrawal seizures and the potentially life-threatening nature of these.  These are typically admitted to the hospital because of this.  He continues to deny alcohol use.   Minimizes prior seizure history I found in the chart as well.  Did not want benzodiazepines, gabapentin, or phenobarbital.  Does not want any treatment at all.  He is alert and oriented and not hallucinating.  He is competent to make his own healthcare decisions including those AGAINST MEDICAL ADVICE.  He had multiple electrolyte abnormalities which were treated with IV fluids and magnesium.  He has multiple prior episodes of liver enzyme elevation and should follow-up with primary care about this.  This is the first time his creatinine has been elevated although there are been no labs in the past 2 years.  He states he is in the process of getting a primary care physician.  He needs follow-up of all of these things.  I offered a referral for primary care which he declined.      Disposition   The patient was discharged.     Diagnosis     ICD-10-CM    1. Seizure (H)  R56.9     Withdrawal seizure is of concern, patient did not want treatment or admission      2. History of alcohol dependence (H)  F10.21       3. Elevated serum creatinine  R79.89       4. Hypomagnesemia  E83.42       5. Elevated LFTs  R79.89             Ligia Barclay MD  04/02/25 [id number]

## 2025-04-02 NOTE — DISCHARGE INSTRUCTIONS
A  is required to report a seizure, and the date it occurred, to the Department within thirty (30) days of the episode. Failure to report or misrepresentation by the  could result in a loss of driving privileges for up to six (6) months following the discovery.    A  needs to remain seizure-free for three (3) months following a seizure before reinstatement of their  s license. It is required to submit a physicians  statement to [facility] after the three (3) months.    ALCOHOL WITHDRAWAL SEIZURES CAN BE A SIGNAL OF LIFE THREATENING WITHDRAWAL EFFECTS IN THE BRAIN.  You did not want treatment or admission for this today.  Return at any time if you are open to treatment.

## 2025-04-02 NOTE — PHARMACY-ADMISSION MEDICATION HISTORY
Pharmacy Intern Admission Medication History    Admission medication history is complete. The information provided in this note is only as accurate as the sources available at the time of the update.    Information Source(s): Patient and CareEverywhere/[employer] via in-person    Pertinent Information: Per [employer], 60 table of allopurinol 100 mg were filled for 30 days supply on 4/2/25. However, pt confirmed taking 1 tablet once daily    Changes made to PTA medication list:  Added: None  Deleted: hydroxyzine, multivitamin, naltrexone, nicotine gum and patch, and pantoprazole  Changed: None    Allergies reviewed with patient and updates made in EHR: yes    Medication History Completed By: Primo Bauman 4/2/2025 4:54 PM    PTA Med List   Medication Sig Last Dose/Taking    allopurinol (ZYLOPRIM) 100 MG tablet Take 100 mg by mouth daily. 4/2/2025 Morning

## 2025-04-03 LAB
ATRIAL RATE - MUSE: 137 BPM
DIASTOLIC BLOOD PRESSURE - MUSE: NORMAL MMHG
INTERPRETATION ECG - MUSE: NORMAL
P AXIS - MUSE: 24 DEGREES
PR INTERVAL - MUSE: 150 MS
QRS DURATION - MUSE: 90 MS
QT - MUSE: 278 MS
QTC - MUSE: 419 MS
R AXIS - MUSE: 91 DEGREES
SYSTOLIC BLOOD PRESSURE - MUSE: NORMAL MMHG
T AXIS - MUSE: 13 DEGREES
VENTRICULAR RATE- MUSE: 137 BPM